# Patient Record
Sex: MALE | Race: BLACK OR AFRICAN AMERICAN | NOT HISPANIC OR LATINO | Employment: OTHER | URBAN - METROPOLITAN AREA
[De-identification: names, ages, dates, MRNs, and addresses within clinical notes are randomized per-mention and may not be internally consistent; named-entity substitution may affect disease eponyms.]

---

## 2023-04-10 ENCOUNTER — OFFICE VISIT (OUTPATIENT)
Dept: PRIMARY CARE | Facility: CLINIC | Age: 83
End: 2023-04-10
Payer: MEDICARE

## 2023-04-10 VITALS
WEIGHT: 177.8 LBS | BODY MASS INDEX: 25.51 KG/M2 | HEART RATE: 68 BPM | RESPIRATION RATE: 16 BRPM | SYSTOLIC BLOOD PRESSURE: 130 MMHG | DIASTOLIC BLOOD PRESSURE: 80 MMHG | TEMPERATURE: 98.6 F

## 2023-04-10 DIAGNOSIS — N40.1 BENIGN PROSTATIC HYPERPLASIA WITH NOCTURIA: ICD-10-CM

## 2023-04-10 DIAGNOSIS — E78.49 OTHER HYPERLIPIDEMIA: ICD-10-CM

## 2023-04-10 DIAGNOSIS — F01.A0 MILD VASCULAR DEMENTIA WITHOUT BEHAVIORAL DISTURBANCE, PSYCHOTIC DISTURBANCE, MOOD DISTURBANCE, OR ANXIETY (MULTI): ICD-10-CM

## 2023-04-10 DIAGNOSIS — I63.9 CEREBRAL INFARCTION, UNSPECIFIED MECHANISM (MULTI): ICD-10-CM

## 2023-04-10 DIAGNOSIS — I63.9 CEREBROVASCULAR ACCIDENT (CVA), UNSPECIFIED MECHANISM (MULTI): Primary | ICD-10-CM

## 2023-04-10 DIAGNOSIS — M48.061 SPINAL STENOSIS OF LUMBAR REGION WITHOUT NEUROGENIC CLAUDICATION: ICD-10-CM

## 2023-04-10 DIAGNOSIS — G47.62 NOCTURNAL LEG CRAMPS: ICD-10-CM

## 2023-04-10 DIAGNOSIS — G25.81 RLS (RESTLESS LEGS SYNDROME): ICD-10-CM

## 2023-04-10 DIAGNOSIS — R35.1 BENIGN PROSTATIC HYPERPLASIA WITH NOCTURIA: ICD-10-CM

## 2023-04-10 DIAGNOSIS — N18.32 STAGE 3B CHRONIC KIDNEY DISEASE (MULTI): ICD-10-CM

## 2023-04-10 DIAGNOSIS — I10 PRIMARY HYPERTENSION: ICD-10-CM

## 2023-04-10 PROBLEM — N43.3 HYDROCELE: Status: ACTIVE | Noted: 2023-04-10

## 2023-04-10 PROBLEM — M47.12 OSTEOARTHRITIS OF CERVICAL SPINE WITH MYELOPATHY: Status: ACTIVE | Noted: 2023-04-10

## 2023-04-10 PROBLEM — H02.88A MEIBOMIAN GLAND DYSFUNCTION (MGD), BILATERAL, BOTH UPPER AND LOWER LIDS: Status: ACTIVE | Noted: 2023-04-10

## 2023-04-10 PROBLEM — R82.90 ABNORMAL URINE FINDINGS: Status: ACTIVE | Noted: 2023-04-10

## 2023-04-10 PROBLEM — H02.88B MEIBOMIAN GLAND DYSFUNCTION (MGD), BILATERAL, BOTH UPPER AND LOWER LIDS: Status: ACTIVE | Noted: 2023-04-10

## 2023-04-10 PROBLEM — R79.89 ELEVATED SERUM CREATININE: Status: ACTIVE | Noted: 2023-04-10

## 2023-04-10 PROBLEM — H52.4 BILATERAL PRESBYOPIA: Status: ACTIVE | Noted: 2023-04-10

## 2023-04-10 PROBLEM — M25.431 SWELLING OF RIGHT WRIST: Status: ACTIVE | Noted: 2023-04-10

## 2023-04-10 PROBLEM — H04.123 BILATERAL DRY EYES: Status: ACTIVE | Noted: 2023-04-10

## 2023-04-10 PROBLEM — H04.123 DRY EYE SYNDROME OF BOTH LACRIMAL GLANDS: Status: ACTIVE | Noted: 2023-04-10

## 2023-04-10 PROBLEM — R53.1 LEFT-SIDED WEAKNESS: Status: ACTIVE | Noted: 2023-04-10

## 2023-04-10 PROBLEM — H91.90 HEARING LOSS: Status: ACTIVE | Noted: 2023-04-10

## 2023-04-10 PROBLEM — E78.5 HYPERLIPIDEMIA: Status: ACTIVE | Noted: 2023-04-10

## 2023-04-10 PROBLEM — F01.50 VASCULAR DEMENTIA (MULTI): Status: ACTIVE | Noted: 2023-04-10

## 2023-04-10 PROBLEM — N39.0 BACTERIAL UTI: Status: ACTIVE | Noted: 2023-04-10

## 2023-04-10 PROBLEM — N52.9 MALE ERECTILE DISORDER OF ORGANIC ORIGIN: Status: ACTIVE | Noted: 2023-04-10

## 2023-04-10 PROBLEM — H90.3 BILATERAL SENSORINEURAL HEARING LOSS: Status: ACTIVE | Noted: 2023-04-10

## 2023-04-10 PROBLEM — H52.03 HYPERMETROPIA OF BOTH EYES: Status: ACTIVE | Noted: 2023-04-10

## 2023-04-10 PROBLEM — M25.531 RIGHT WRIST PAIN: Status: ACTIVE | Noted: 2023-04-10

## 2023-04-10 PROBLEM — R32 URINARY INCONTINENCE: Status: ACTIVE | Noted: 2023-04-10

## 2023-04-10 PROBLEM — M10.9 GOUTY ARTHRITIS: Status: ACTIVE | Noted: 2023-04-10

## 2023-04-10 PROBLEM — E55.9 VITAMIN D DEFICIENCY: Status: ACTIVE | Noted: 2023-04-10

## 2023-04-10 PROBLEM — J02.9 SORE THROAT: Status: ACTIVE | Noted: 2023-04-10

## 2023-04-10 PROBLEM — N18.30 CKD (CHRONIC KIDNEY DISEASE), STAGE III (MULTI): Status: ACTIVE | Noted: 2023-04-10

## 2023-04-10 PROBLEM — R35.0 URINARY FREQUENCY: Status: ACTIVE | Noted: 2023-04-10

## 2023-04-10 PROBLEM — R26.9 GAIT ABNORMALITY: Status: ACTIVE | Noted: 2023-04-10

## 2023-04-10 PROBLEM — K59.00 CONSTIPATION, ACUTE: Status: ACTIVE | Noted: 2023-04-10

## 2023-04-10 PROBLEM — Z96.1 BILATERAL PSEUDOPHAKIA: Status: ACTIVE | Noted: 2023-04-10

## 2023-04-10 PROBLEM — N50.89 SWELLING OF LEFT HALF OF SCROTUM: Status: ACTIVE | Noted: 2023-04-10

## 2023-04-10 PROBLEM — I26.99 PULMONARY EMBOLISM (MULTI): Status: ACTIVE | Noted: 2023-04-10

## 2023-04-10 PROBLEM — R25.1 TREMOR: Status: ACTIVE | Noted: 2023-04-10

## 2023-04-10 PROBLEM — N32.81 OVERACTIVE BLADDER: Status: ACTIVE | Noted: 2023-04-10

## 2023-04-10 PROBLEM — H52.203 ASTIGMATISM OF BOTH EYES: Status: ACTIVE | Noted: 2023-04-10

## 2023-04-10 PROBLEM — N40.0 BPH (BENIGN PROSTATIC HYPERPLASIA): Status: ACTIVE | Noted: 2023-04-10

## 2023-04-10 PROBLEM — R26.89 SHUFFLING GAIT: Status: ACTIVE | Noted: 2023-04-10

## 2023-04-10 PROBLEM — R13.10 SWALLOWING IMPAIRED: Status: ACTIVE | Noted: 2023-04-10

## 2023-04-10 PROBLEM — N28.1 CYST, KIDNEY, ACQUIRED: Status: ACTIVE | Noted: 2023-04-10

## 2023-04-10 PROBLEM — A49.9 BACTERIAL UTI: Status: ACTIVE | Noted: 2023-04-10

## 2023-04-10 PROBLEM — J06.9 VIRAL UPPER RESPIRATORY TRACT INFECTION: Status: ACTIVE | Noted: 2023-04-10

## 2023-04-10 PROBLEM — R26.81 GAIT INSTABILITY: Status: ACTIVE | Noted: 2023-04-10

## 2023-04-10 PROCEDURE — 99214 OFFICE O/P EST MOD 30 MIN: CPT | Performed by: INTERNAL MEDICINE

## 2023-04-10 PROCEDURE — 3079F DIAST BP 80-89 MM HG: CPT | Performed by: INTERNAL MEDICINE

## 2023-04-10 PROCEDURE — 1160F RVW MEDS BY RX/DR IN RCRD: CPT | Performed by: INTERNAL MEDICINE

## 2023-04-10 PROCEDURE — 1159F MED LIST DOCD IN RCRD: CPT | Performed by: INTERNAL MEDICINE

## 2023-04-10 PROCEDURE — 1036F TOBACCO NON-USER: CPT | Performed by: INTERNAL MEDICINE

## 2023-04-10 PROCEDURE — 3075F SYST BP GE 130 - 139MM HG: CPT | Performed by: INTERNAL MEDICINE

## 2023-04-10 RX ORDER — OXYCODONE HYDROCHLORIDE 5 MG/1
TABLET ORAL
COMMUNITY
Start: 2022-12-13 | End: 2023-07-17 | Stop reason: ALTCHOICE

## 2023-04-10 RX ORDER — MV/FA/DHA/EPA/FISH OIL/SAW/GNK 400MCG-200
COMBINATION PACKAGE (EA) ORAL
COMMUNITY

## 2023-04-10 RX ORDER — NIFEDIPINE 90 MG/1
1 TABLET, FILM COATED, EXTENDED RELEASE ORAL DAILY
COMMUNITY
Start: 2014-06-12 | End: 2023-07-17 | Stop reason: SDUPTHER

## 2023-04-10 RX ORDER — CYCLOBENZAPRINE HCL 5 MG
5 TABLET ORAL 3 TIMES DAILY PRN
Qty: 30 TABLET | Refills: 2 | Status: SHIPPED | OUTPATIENT
Start: 2023-04-10 | End: 2023-08-17 | Stop reason: SDUPTHER

## 2023-04-10 RX ORDER — CYCLOBENZAPRINE HCL 5 MG
5 TABLET ORAL NIGHTLY
COMMUNITY
Start: 2023-02-26 | End: 2023-04-10 | Stop reason: SDUPTHER

## 2023-04-10 RX ORDER — ATORVASTATIN CALCIUM 20 MG/1
1 TABLET, FILM COATED ORAL DAILY
COMMUNITY

## 2023-04-10 RX ORDER — DONEPEZIL HYDROCHLORIDE 10 MG/1
1 TABLET, FILM COATED ORAL NIGHTLY
COMMUNITY
Start: 2022-01-20 | End: 2023-04-10 | Stop reason: SDUPTHER

## 2023-04-10 RX ORDER — ASPIRIN 81 MG/1
1 TABLET ORAL DAILY
COMMUNITY
Start: 2019-11-25

## 2023-04-10 RX ORDER — ROPINIROLE 0.5 MG/1
1 TABLET, FILM COATED ORAL NIGHTLY
COMMUNITY
Start: 2021-04-27 | End: 2024-02-06 | Stop reason: SDUPTHER

## 2023-04-10 RX ORDER — DONEPEZIL HYDROCHLORIDE 10 MG/1
10 TABLET, FILM COATED ORAL NIGHTLY
Qty: 30 TABLET | Refills: 2 | Status: SHIPPED | OUTPATIENT
Start: 2023-04-10 | End: 2023-07-17 | Stop reason: SDUPTHER

## 2023-04-10 RX ORDER — MIRABEGRON 25 MG/1
25 TABLET, FILM COATED, EXTENDED RELEASE ORAL DAILY
COMMUNITY
Start: 2023-01-09 | End: 2023-07-17 | Stop reason: SDUPTHER

## 2023-04-10 ASSESSMENT — ENCOUNTER SYMPTOMS
DEPRESSION: 0
OCCASIONAL FEELINGS OF UNSTEADINESS: 1
LOSS OF SENSATION IN FEET: 0

## 2023-04-10 NOTE — PROGRESS NOTES
Marlo Carl is a 82 y.o. male   Patient with a past medical history of HTN, HLD, BPH with LUTS, CKD III. Lumbar Stenosis, Pulmonary Embolism, Vascular Dementia, OAB, Incontinence, Anemia    Has not seen the Urologist yet  Has not seen the Neurologist yet                 Review of Systems     Constitutional: no fever, no chills, not feeling poorly, not feeling tired and no recent weight gain, no recent weight loss.   ENT: no earache, no hearing loss, no nosebleeds, no nasal discharge, no sore throat and no hoarseness.   Cardiovascular: the heart rate was not slow, the heart rate was not fast, no chest pain, no palpitations, no intermittent leg claudication and no lower extremity edema.   Respiratory: no cough, wheezing or shortness of breath at rest or exertion  Gastrointestinal: no abdominal pain, no constipation, no melena, no nausea, no diarrhea, no vomiting and no blood in stools.   Musculoskeletal: no arthralgias, no myalgias, no back pain, no joint swelling, no joint stiffness, no limb pain and no limb swelling.   Integumentary: no skin rashes, no skin lesions, no itching, no skin wound and no dry skin.   Neurological: no headache, no confusion, no numbness, no dizziness, no tingling and no fainting.   All other systems have been reviewed and are negative for complaint.       Vitals:    04/10/23 1612   Temp: 37 °C (98.6 °F)        Physical Exam     Constitutional   General appearance: Alert and in no acute distress.     Pulmonary   Respiratory assessment: No respiratory distress, normal respiratory rhythm and effort.    Auscultation of Lungs: Clear bilateral breath sounds.   Cardiovascular   Auscultation of heart: Apical pulse normal, heart rate and rhythm normal, normal S1 and S2, no murmurs and no pericardial rub.    Exam for edema: No peripheral edema.   Abdomen   Abdominal Exam: No bruits, normal bowel sounds, soft, non-tender, no abdominal mass palpated.    Liver and Spleen exam: No hepato-splenomegaly.    Musculoskeletal   Examination of gait: Normal.    Inspection of digits and nails: No clubbing or cyanosis of the fingernails.    Inspection/palpation of joints, bones and muscles: No joint swelling. Normal movement of all extremities.   Skin   Skin inspection: Normal skin color and pigmentation, normal skin turgor and no visible rash.   Neurologic   Cranial nerves: Nerves 2-12 were intact, no focal neuro defects.     Assessment/Plan          Patient with a past medical history of HTN, HLD, BPH with LUTS, CKD III. Lumbar Stenosis, Pulmonary Embolism, Vascular Dementia, OAB, Incontinence       # HTN  condition is stable  continue current medications     # CKD III  stable     # HLD  condition is stable  continue current medications     # Vascular Dementia  neurology referral pending; shuffling gait and tremor- concerned for Parkinson's      # BPH/ Nocturia/ Scrotal swelling  S/p Hydrocele surgery  Doing better as far as hydrocele goes  But incontinence persists (was done at [facility])

## 2023-04-13 DIAGNOSIS — N39.0 RECURRENT UTI: ICD-10-CM

## 2023-04-13 DIAGNOSIS — R25.1 TREMORS OF NERVOUS SYSTEM: ICD-10-CM

## 2023-07-17 ENCOUNTER — OFFICE VISIT (OUTPATIENT)
Dept: PRIMARY CARE | Facility: CLINIC | Age: 83
End: 2023-07-17
Payer: MEDICARE

## 2023-07-17 VITALS
RESPIRATION RATE: 16 BRPM | HEART RATE: 81 BPM | DIASTOLIC BLOOD PRESSURE: 86 MMHG | OXYGEN SATURATION: 97 % | BODY MASS INDEX: 25.54 KG/M2 | SYSTOLIC BLOOD PRESSURE: 175 MMHG | WEIGHT: 178 LBS

## 2023-07-17 DIAGNOSIS — I10 PRIMARY HYPERTENSION: Primary | ICD-10-CM

## 2023-07-17 DIAGNOSIS — E78.49 OTHER HYPERLIPIDEMIA: ICD-10-CM

## 2023-07-17 DIAGNOSIS — N39.46 MIXED STRESS AND URGE URINARY INCONTINENCE: ICD-10-CM

## 2023-07-17 DIAGNOSIS — F01.A0 MILD VASCULAR DEMENTIA WITHOUT BEHAVIORAL DISTURBANCE, PSYCHOTIC DISTURBANCE, MOOD DISTURBANCE, OR ANXIETY (MULTI): ICD-10-CM

## 2023-07-17 LAB
POC HDL CHOLESTEROL: 53 MG/DL (ref 0–40)
POC LDL CHOLESTEROL: 63 MG/DL (ref 0–100)
POC NON-HDL CHOLESTEROL: 87 MG/DL (ref 0–130)
POC TOTAL CHOLESTEROL/HDL RATIO: 2.6 (ref 0–4.5)
POC TOTAL CHOLESTEROL: 140 MG/DL (ref 0–199)
POC TRIGLYCERIDES: 119 MG/DL (ref 0–150)

## 2023-07-17 PROCEDURE — 1125F AMNT PAIN NOTED PAIN PRSNT: CPT | Performed by: INTERNAL MEDICINE

## 2023-07-17 PROCEDURE — 1157F ADVNC CARE PLAN IN RCRD: CPT | Performed by: INTERNAL MEDICINE

## 2023-07-17 PROCEDURE — 80061 LIPID PANEL: CPT | Performed by: INTERNAL MEDICINE

## 2023-07-17 PROCEDURE — 1159F MED LIST DOCD IN RCRD: CPT | Performed by: INTERNAL MEDICINE

## 2023-07-17 PROCEDURE — 99214 OFFICE O/P EST MOD 30 MIN: CPT | Performed by: INTERNAL MEDICINE

## 2023-07-17 PROCEDURE — 3079F DIAST BP 80-89 MM HG: CPT | Performed by: INTERNAL MEDICINE

## 2023-07-17 PROCEDURE — 1160F RVW MEDS BY RX/DR IN RCRD: CPT | Performed by: INTERNAL MEDICINE

## 2023-07-17 PROCEDURE — 3077F SYST BP >= 140 MM HG: CPT | Performed by: INTERNAL MEDICINE

## 2023-07-17 PROCEDURE — 1036F TOBACCO NON-USER: CPT | Performed by: INTERNAL MEDICINE

## 2023-07-17 RX ORDER — MIRABEGRON 25 MG/1
25 TABLET, FILM COATED, EXTENDED RELEASE ORAL DAILY
Qty: 30 TABLET | Refills: 2 | Status: SHIPPED | OUTPATIENT
Start: 2023-07-17 | End: 2023-10-19 | Stop reason: ALTCHOICE

## 2023-07-17 RX ORDER — NIFEDIPINE 90 MG/1
90 TABLET, FILM COATED, EXTENDED RELEASE ORAL DAILY
Qty: 30 TABLET | Refills: 2 | Status: SHIPPED | OUTPATIENT
Start: 2023-07-17 | End: 2023-10-25 | Stop reason: SDUPTHER

## 2023-07-17 RX ORDER — DONEPEZIL HYDROCHLORIDE 10 MG/1
10 TABLET, FILM COATED ORAL NIGHTLY
Qty: 30 TABLET | Refills: 2 | Status: SHIPPED | OUTPATIENT
Start: 2023-07-17 | End: 2024-05-01

## 2023-07-17 NOTE — PROGRESS NOTES
Marlo Carl is a 83 y.o. male   Patient with a past medical history of HTN, HLD, BPH with LUTS, CKD III. Lumbar Stenosis, Pulmonary Embolism, Vascular Dementia, OAB, Incontinence, Anemia      Has not seen the Neurologist yet (appointment in September)  Seeing Urology at [facility] for the hydrocele    Feels fine  No chest pain/  SOB/ dizziness  BM OK  Energy level ok  Appetite OK    Did have one fall 3 weeks ago  No injuries                   Review of Systems     Constitutional: no fever, no chills, not feeling poorly, not feeling tired and no recent weight gain, no recent weight loss.   ENT: no earache, no hearing loss, no nosebleeds, no nasal discharge, no sore throat and no hoarseness.   Cardiovascular: the heart rate was not slow, the heart rate was not fast, no chest pain, no palpitations, no intermittent leg claudication and no lower extremity edema.   Respiratory: no cough, wheezing or shortness of breath at rest or exertion  Gastrointestinal: no abdominal pain, no constipation, no melena, no nausea, no diarrhea, no vomiting and no blood in stools.   Musculoskeletal: no arthralgias, no myalgias, no back pain, no joint swelling, no joint stiffness, no limb pain and no limb swelling.   Integumentary: no skin rashes, no skin lesions, no itching, no skin wound and no dry skin.   Neurological: no headache, no confusion, no numbness, no dizziness, no tingling and no fainting.   All other systems have been reviewed and are negative for complaint.       Vitals:    07/17/23 1613   BP: 175/86   Pulse: 81   Resp: 16   SpO2: 97%        Physical Exam     Constitutional   General appearance: Alert and in no acute distress.     Pulmonary   Respiratory assessment: No respiratory distress, normal respiratory rhythm and effort.    Auscultation of Lungs: Clear bilateral breath sounds.   Cardiovascular   Auscultation of heart: Apical pulse normal, heart rate and rhythm normal, normal S1 and S2, no murmurs and no pericardial rub.     Exam for edema: No peripheral edema.   Abdomen   Abdominal Exam: No bruits, normal bowel sounds, soft, non-tender, no abdominal mass palpated.    Liver and Spleen exam: No hepato-splenomegaly.   Musculoskeletal   Examination of gait: Normal.    Inspection of digits and nails: No clubbing or cyanosis of the fingernails.    Inspection/palpation of joints, bones and muscles: No joint swelling. Normal movement of all extremities.   Skin   Skin inspection: Normal skin color and pigmentation, normal skin turgor and no visible rash.   Neurologic   Cranial nerves: Nerves 2-12 were intact, no focal neuro defects.     Assessment/Plan          Patient with a past medical history of HTN, HLD, BPH with LUTS, CKD III. Lumbar Stenosis, Pulmonary Embolism, Vascular Dementia, OAB, Incontinence       # HTN  Elevated  Has been forgetting to take meds  family will make sure of compliance       # CKD III  stable     # HLD  condition is stable  continue current medications     # Vascular Dementia  neurology referral pending; shuffling gait and tremor- concerned for Parkinson's   Recommend to use hearing aides everyday     # BPH/ Nocturia/ Scrotal swelling  S/p Hydrocele surgery  Doing better  Incontinence has improved  But needs diapers    Total appt time today was 35  minutes. Time included preparing to see the pt, obtaining the hx, performing the medically necessary appropriate physical exam, counseling & educating the pt, ordering tests & procedures, referring & communicating w/other providers, independently interpreting results & communicating the results to the pt, care, coordination & documenting clinical information in the medical record.

## 2023-08-17 DIAGNOSIS — G47.62 NOCTURNAL LEG CRAMPS: ICD-10-CM

## 2023-08-17 RX ORDER — CYCLOBENZAPRINE HCL 5 MG
5 TABLET ORAL 3 TIMES DAILY PRN
Qty: 30 TABLET | Refills: 2 | Status: ON HOLD | OUTPATIENT
Start: 2023-08-17 | End: 2024-05-01 | Stop reason: ALTCHOICE

## 2023-08-17 NOTE — TELEPHONE ENCOUNTER
Rx Refill Request Telephone Encounter    Name:  Marlo Carl  :  [id number]  Medication Name:  cclobenzaprine 5mg   Specific Pharmacy location:  [facility]

## 2023-10-19 ENCOUNTER — APPOINTMENT (OUTPATIENT)
Dept: PRIMARY CARE | Facility: CLINIC | Age: 83
End: 2023-10-19
Payer: MEDICARE

## 2023-10-19 ENCOUNTER — OFFICE VISIT (OUTPATIENT)
Dept: UROLOGY | Facility: CLINIC | Age: 83
End: 2023-10-19
Payer: MEDICARE

## 2023-10-19 VITALS
HEART RATE: 94 BPM | DIASTOLIC BLOOD PRESSURE: 86 MMHG | TEMPERATURE: 96.4 F | BODY MASS INDEX: 23.02 KG/M2 | WEIGHT: 160.4 LBS | SYSTOLIC BLOOD PRESSURE: 163 MMHG

## 2023-10-19 DIAGNOSIS — N39.41 URGENCY INCONTINENCE: Primary | ICD-10-CM

## 2023-10-19 DIAGNOSIS — N32.81 OVERACTIVE BLADDER: ICD-10-CM

## 2023-10-19 PROCEDURE — 1160F RVW MEDS BY RX/DR IN RCRD: CPT | Performed by: STUDENT IN AN ORGANIZED HEALTH CARE EDUCATION/TRAINING PROGRAM

## 2023-10-19 PROCEDURE — 99204 OFFICE O/P NEW MOD 45 MIN: CPT | Performed by: STUDENT IN AN ORGANIZED HEALTH CARE EDUCATION/TRAINING PROGRAM

## 2023-10-19 PROCEDURE — 1125F AMNT PAIN NOTED PAIN PRSNT: CPT | Performed by: STUDENT IN AN ORGANIZED HEALTH CARE EDUCATION/TRAINING PROGRAM

## 2023-10-19 PROCEDURE — 3077F SYST BP >= 140 MM HG: CPT | Performed by: STUDENT IN AN ORGANIZED HEALTH CARE EDUCATION/TRAINING PROGRAM

## 2023-10-19 PROCEDURE — 3079F DIAST BP 80-89 MM HG: CPT | Performed by: STUDENT IN AN ORGANIZED HEALTH CARE EDUCATION/TRAINING PROGRAM

## 2023-10-19 PROCEDURE — 1159F MED LIST DOCD IN RCRD: CPT | Performed by: STUDENT IN AN ORGANIZED HEALTH CARE EDUCATION/TRAINING PROGRAM

## 2023-10-19 RX ORDER — MIRABEGRON 50 MG/1
50 TABLET, EXTENDED RELEASE ORAL DAILY
Status: DISCONTINUED | OUTPATIENT
Start: 2023-10-19 | End: 2024-02-06

## 2023-10-19 NOTE — PROGRESS NOTES
Marlo presents as a new patient for an evaluation.  The patient’s EMR has been reviewed.  Lives in [place], [place]  Accompanied by Daughter, Nataliia whom provides additional history.   Previously evaluated by Dr. Piña and Dr. Quiroz.    SUBJECTIVE: HPI   TODAY (10/18/23)  H/o BPH w/ LUTS, OAB symptoms and UUI.  Has failed bladder botox (2019) and myrbetriq 25 mg for his UUI.   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   Has tried condom catheterization in the past, but it would not stay on.  Has tried ISC in the past, however only produced low volumes thus discontinued.  Continues to leak, worse at night, wears precautionary pads for this.     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  PSH botox, hyrocelectomy  FH of cancer, unspecified.   Former smoker (quit in [id number])    Past medical, surgical, family and social history in the chart was reviewed and is accurate including any additions to what is in this HPI.    Review of Systems   Constitutional: denies any unintentional weight loss or change in strength.  Integumentary: denies any rashes or pruritus.  Eyes: denies any double vision or eye pain.  Ear/Nose/Mouth/Throat: denies any nosebleeds or gum bleeds.  Cardiovascular: denies any chest pain or syncope.  Respiratory: denies hemoptysis.  Gastrointestinal: denies nausea or vomiting.  Musculoskeletal: denies muscle cramping or weakness.  Neurologic: denies convulsions or seizures.  Hematologic/Lymphatic: denies bleeding tendencies.  Endocrine: denies heat/cold intolerance.  All other systems have been reviewed and are negative unless otherwise noted in the HPI.    OBJECTIVE:  Visit Vitals  /86   Pulse 94   Temp 35.8 °C (96.4 °F)     Physical Exam   Constitutional: No obvious distress.  Eyes: Non-injected conjunctiva, sclera clear, EOMI.  Ears/Nose/Mouth/Throat: No obvious drainage per ears or nose.  Cardiovascular: Extremities are warm and well  perfused. No edema, cyanosis or pallor.  Respiratory: No audible wheezing/stridor; respirations do not appear labored.  Gastrointestinal: Abdomen soft, not distended.  Musculoskeletal: Normal ROM of extremities.  Skin: No obvious rashes or open sores.  Neurologic: Alert and oriented, CN 2-12 grossly intact.  Psychiatric: Answers questions appropriately with normal affect.  Hematologic/Lymphatic/Immunologic: No obvious bruises or sites of spontaneous bleeding.  Genitourinary: No CVA tenderness, bladder not palpable.     Labs and imaging:  Lab Results   Component Value Date    WBC 7.7 10/15/2022    HGB 12.7 (L) 10/15/2022    HCT 39.5 (L) 10/15/2022     10/15/2022    CHOL 140 07/17/2023    TRIG 119 07/17/2023    HDL 53 (A) 07/17/2023    ALT 20 10/15/2022    AST 20 10/15/2022     10/15/2022    K 3.9 10/15/2022     10/15/2022    CREATININE 1.70 (H) 10/15/2022    BUN 24 (H) 10/15/2022    CO2 25 10/15/2022    TSH 2.23 03/12/2022    PSA 2.44 03/12/2022    INR 1.0 10/11/2019     ASSESSMENT:  Problem List Items Addressed This Visit       Overactive bladder    Relevant Medications    mirabegron (Myrbetriq) 24 hr tablet 50 mg (Start on 10/19/2023  3:15 PM)    Other Relevant Orders    Follow Up In Urology     Other Visit Diagnoses       Urgency incontinence    -  Primary    Relevant Medications    mirabegron (Myrbetriq) 24 hr tablet 50 mg (Start on 10/19/2023  3:15 PM)    Other Relevant Orders    Follow Up In Urology     PLAN:  For his OAB symptoms and persistent incontinence.   Discussed AUA guidelines for overactive bladder symptoms (OAB).  Reviewed behavioral medications, medications, and third-line procedures.   Patient agrees to proceed with behavioral modifications, timed voids, and monitoring bladder irritants.  In addition, advised the following:  -Avoid caffeine  -Avoid/restrict fluids altogether after 6 pm.    Discussed options including anticholinergics, myrbetriq, bladder botox and PTNS.  Will  proceed with increasing his myrbetriq dose to 50 mg.  All potential risks and side-effects discussed.  Provided referral to Dr. Delgado for consideration for neuromodulation. He has already failed botox and symptoms remain severe.   Plan to RTC in 3 months for reassessment.     All questions were answered to the patient’s satisfaction.  Patient agrees with the plan and wishes to proceed.    Scribed for Dr. Avinash German by Ehsan Santana.  I, Dr. Avinash German have personally reviewed and agreed with the information entered by the Virtual Scribe. 10/19/23.

## 2023-10-24 ENCOUNTER — APPOINTMENT (OUTPATIENT)
Dept: PRIMARY CARE | Facility: CLINIC | Age: 83
End: 2023-10-24
Payer: MEDICARE

## 2023-10-25 ENCOUNTER — OFFICE VISIT (OUTPATIENT)
Dept: PRIMARY CARE | Facility: CLINIC | Age: 83
End: 2023-10-25
Payer: MEDICARE

## 2023-10-25 VITALS
TEMPERATURE: 97.8 F | RESPIRATION RATE: 16 BRPM | DIASTOLIC BLOOD PRESSURE: 80 MMHG | WEIGHT: 179.2 LBS | HEART RATE: 70 BPM | BODY MASS INDEX: 25.71 KG/M2 | SYSTOLIC BLOOD PRESSURE: 130 MMHG

## 2023-10-25 DIAGNOSIS — Z23 INFLUENZA VACCINATION ADMINISTERED AT CURRENT VISIT: ICD-10-CM

## 2023-10-25 DIAGNOSIS — R35.1 BENIGN PROSTATIC HYPERPLASIA WITH NOCTURIA: ICD-10-CM

## 2023-10-25 DIAGNOSIS — F01.A0 MILD VASCULAR DEMENTIA WITHOUT BEHAVIORAL DISTURBANCE, PSYCHOTIC DISTURBANCE, MOOD DISTURBANCE, OR ANXIETY (MULTI): ICD-10-CM

## 2023-10-25 DIAGNOSIS — G25.81 RLS (RESTLESS LEGS SYNDROME): ICD-10-CM

## 2023-10-25 DIAGNOSIS — E78.49 OTHER HYPERLIPIDEMIA: ICD-10-CM

## 2023-10-25 DIAGNOSIS — I10 PRIMARY HYPERTENSION: Primary | ICD-10-CM

## 2023-10-25 DIAGNOSIS — N40.1 BENIGN PROSTATIC HYPERPLASIA WITH NOCTURIA: ICD-10-CM

## 2023-10-25 PROBLEM — M19.041 PRIMARY OSTEOARTHRITIS, RIGHT HAND: Status: ACTIVE | Noted: 2021-07-26

## 2023-10-25 PROBLEM — I26.01: Status: ACTIVE | Noted: 2023-04-29

## 2023-10-25 PROBLEM — I69.354 HEMIPLGA FOLLOWING CEREBRAL INFRC AFFECTING LEFT NONDOM SIDE (MULTI): Status: ACTIVE | Noted: 2021-07-26

## 2023-10-25 LAB
POC HDL CHOLESTEROL: 49 MG/DL (ref 0–40)
POC LDL CHOLESTEROL: 57 MG/DL (ref 0–100)
POC NON-HDL CHOLESTEROL: 84 MG/DL (ref 0–130)
POC TOTAL CHOLESTEROL/HDL RATIO: 2.7 (ref 0–4.5)
POC TOTAL CHOLESTEROL: 132 MG/DL (ref 0–199)
POC TRIGLYCERIDES: 135 MG/DL (ref 0–150)

## 2023-10-25 PROCEDURE — 3079F DIAST BP 80-89 MM HG: CPT | Performed by: INTERNAL MEDICINE

## 2023-10-25 PROCEDURE — 1160F RVW MEDS BY RX/DR IN RCRD: CPT | Performed by: INTERNAL MEDICINE

## 2023-10-25 PROCEDURE — 90662 IIV NO PRSV INCREASED AG IM: CPT | Performed by: INTERNAL MEDICINE

## 2023-10-25 PROCEDURE — 99214 OFFICE O/P EST MOD 30 MIN: CPT | Performed by: INTERNAL MEDICINE

## 2023-10-25 PROCEDURE — 1125F AMNT PAIN NOTED PAIN PRSNT: CPT | Performed by: INTERNAL MEDICINE

## 2023-10-25 PROCEDURE — G0008 ADMIN INFLUENZA VIRUS VAC: HCPCS | Performed by: INTERNAL MEDICINE

## 2023-10-25 PROCEDURE — 80061 LIPID PANEL: CPT | Performed by: INTERNAL MEDICINE

## 2023-10-25 PROCEDURE — 3075F SYST BP GE 130 - 139MM HG: CPT | Performed by: INTERNAL MEDICINE

## 2023-10-25 PROCEDURE — 1159F MED LIST DOCD IN RCRD: CPT | Performed by: INTERNAL MEDICINE

## 2023-10-25 RX ORDER — NIFEDIPINE 90 MG/1
90 TABLET, FILM COATED, EXTENDED RELEASE ORAL DAILY
Qty: 90 TABLET | Refills: 2 | Status: SHIPPED | OUTPATIENT
Start: 2023-10-25 | End: 2024-07-21

## 2023-10-25 NOTE — PROGRESS NOTES
Marlo Carl is a 83 y.o. male   Patient with a past medical history of HTN, HLD, BPH with LUTS, CKD III. Lumbar Stenosis, Pulmonary Embolism, Vascular Dementia, OAB, Incontinence, Anemia      Has not seen the Neurologist yet (appointment in September)  Seeing Urology at [facility] for the hydrocele    Feels fine  No chest pain/  SOB/ dizziness  BM OK  Energy level ok  Appetite OK    Did have one fall 3 weeks ago  No injuries                 Review of Systems     Constitutional: no fever, no chills, not feeling poorly, not feeling tired and no recent weight gain, no recent weight loss.   ENT: no earache, no hearing loss, no nosebleeds, no nasal discharge, no sore throat and no hoarseness.   Cardiovascular: the heart rate was not slow, the heart rate was not fast, no chest pain, no palpitations, no intermittent leg claudication and no lower extremity edema.   Respiratory: no cough, wheezing or shortness of breath at rest or exertion  Gastrointestinal: no abdominal pain, no constipation, no melena, no nausea, no diarrhea, no vomiting and no blood in stools.   Musculoskeletal: no arthralgias, no myalgias, no back pain, no joint swelling, no joint stiffness, no limb pain and no limb swelling.   Integumentary: no skin rashes, no skin lesions, no itching, no skin wound and no dry skin.   Neurological: no headache, no confusion, no numbness, no dizziness, no tingling and no fainting.   All other systems have been reviewed and are negative for complaint.       Vitals:    10/25/23 1708   Temp: 36.6 °C (97.8 °F)        Physical Exam     Constitutional   General appearance: Alert and in no acute distress.     Pulmonary   Respiratory assessment: No respiratory distress, normal respiratory rhythm and effort.    Auscultation of Lungs: Clear bilateral breath sounds.   Cardiovascular   Auscultation of heart: Apical pulse normal, heart rate and rhythm normal, normal S1 and S2, no murmurs and no pericardial rub.    Exam for edema: No  peripheral edema.   Abdomen   Abdominal Exam: No bruits, normal bowel sounds, soft, non-tender, no abdominal mass palpated.    Liver and Spleen exam: No hepato-splenomegaly.   Musculoskeletal   Examination of gait: Normal.    Inspection of digits and nails: No clubbing or cyanosis of the fingernails.    Inspection/palpation of joints, bones and muscles: No joint swelling. Normal movement of all extremities.   Skin   Skin inspection: Normal skin color and pigmentation, normal skin turgor and no visible rash.   Neurologic   Cranial nerves: Nerves 2-12 were intact, no focal neuro defects.     Assessment/Plan          Patient with a past medical history of HTN, HLD, BPH with LUTS, CKD III. Lumbar Stenosis, Pulmonary Embolism, Vascular Dementia, OAB, Incontinence       # HTN  Elevated  Has been forgetting to take meds  family will make sure of compliance       # CKD III  stable     # HLD  condition is stable  continue current medications     # Vascular Dementia  Recommend to use hearing aides everyday     # BPH/ Nocturia/ Scrotal swelling  S/p Hydrocele surgery  Urology increased Myrbetriq    # OA of Knees  Gets stiff in AM      # RLS  Stable    Total appt time today was 35  minutes. Time included preparing to see the pt, obtaining the hx, performing the medically necessary appropriate physical exam, counseling & educating the pt, ordering tests & procedures, referring & communicating w/other providers, independently interpreting results & communicating the results to the pt, care, coordination & documenting clinical information in the medical record.

## 2023-11-14 DIAGNOSIS — N40.1 BENIGN PROSTATIC HYPERPLASIA WITH NOCTURIA: ICD-10-CM

## 2023-11-14 DIAGNOSIS — R35.1 BENIGN PROSTATIC HYPERPLASIA WITH NOCTURIA: ICD-10-CM

## 2023-11-16 RX ORDER — MIRABEGRON 50 MG/1
50 TABLET, EXTENDED RELEASE ORAL DAILY
Qty: 30 TABLET | Refills: 11 | Status: SHIPPED | OUTPATIENT
Start: 2023-11-16

## 2024-01-18 ENCOUNTER — APPOINTMENT (OUTPATIENT)
Dept: UROLOGY | Facility: CLINIC | Age: 84
End: 2024-01-18
Payer: MEDICARE

## 2024-01-22 ENCOUNTER — OFFICE VISIT (OUTPATIENT)
Dept: UROLOGY | Facility: CLINIC | Age: 84
End: 2024-01-22
Payer: MEDICARE

## 2024-01-22 VITALS
SYSTOLIC BLOOD PRESSURE: 154 MMHG | WEIGHT: 186.6 LBS | TEMPERATURE: 97.5 F | DIASTOLIC BLOOD PRESSURE: 68 MMHG | HEART RATE: 64 BPM | HEIGHT: 71 IN | BODY MASS INDEX: 26.12 KG/M2

## 2024-01-22 DIAGNOSIS — N18.32 STAGE 3B CHRONIC KIDNEY DISEASE (MULTI): ICD-10-CM

## 2024-01-22 DIAGNOSIS — N39.41 URGE INCONTINENCE OF URINE: ICD-10-CM

## 2024-01-22 DIAGNOSIS — F01.A0 MILD VASCULAR DEMENTIA WITHOUT BEHAVIORAL DISTURBANCE, PSYCHOTIC DISTURBANCE, MOOD DISTURBANCE, OR ANXIETY (MULTI): ICD-10-CM

## 2024-01-22 DIAGNOSIS — N39.0 BACTERIAL UTI: ICD-10-CM

## 2024-01-22 DIAGNOSIS — A49.9 BACTERIAL UTI: ICD-10-CM

## 2024-01-22 DIAGNOSIS — R82.90 ABNORMAL URINALYSIS: Primary | ICD-10-CM

## 2024-01-22 LAB
POC APPEARANCE, URINE: ABNORMAL
POC BILIRUBIN, URINE: NEGATIVE
POC BLOOD, URINE: ABNORMAL
POC COLOR, URINE: YELLOW
POC GLUCOSE, URINE: NEGATIVE MG/DL
POC KETONES, URINE: NEGATIVE MG/DL
POC LEUKOCYTES, URINE: ABNORMAL
POC NITRITE,URINE: NEGATIVE
POC PH, URINE: 6 PH
POC PROTEIN, URINE: ABNORMAL MG/DL
POC SPECIFIC GRAVITY, URINE: 1.02
POC UROBILINOGEN, URINE: 0.2 EU/DL

## 2024-01-22 PROCEDURE — 87086 URINE CULTURE/COLONY COUNT: CPT

## 2024-01-22 PROCEDURE — 99214 OFFICE O/P EST MOD 30 MIN: CPT | Performed by: STUDENT IN AN ORGANIZED HEALTH CARE EDUCATION/TRAINING PROGRAM

## 2024-01-22 PROCEDURE — 1159F MED LIST DOCD IN RCRD: CPT | Performed by: STUDENT IN AN ORGANIZED HEALTH CARE EDUCATION/TRAINING PROGRAM

## 2024-01-22 PROCEDURE — 1125F AMNT PAIN NOTED PAIN PRSNT: CPT | Performed by: STUDENT IN AN ORGANIZED HEALTH CARE EDUCATION/TRAINING PROGRAM

## 2024-01-22 PROCEDURE — 3077F SYST BP >= 140 MM HG: CPT | Performed by: STUDENT IN AN ORGANIZED HEALTH CARE EDUCATION/TRAINING PROGRAM

## 2024-01-22 PROCEDURE — 1160F RVW MEDS BY RX/DR IN RCRD: CPT | Performed by: STUDENT IN AN ORGANIZED HEALTH CARE EDUCATION/TRAINING PROGRAM

## 2024-01-22 PROCEDURE — 81002 URINALYSIS NONAUTO W/O SCOPE: CPT | Performed by: STUDENT IN AN ORGANIZED HEALTH CARE EDUCATION/TRAINING PROGRAM

## 2024-01-22 PROCEDURE — 3078F DIAST BP <80 MM HG: CPT | Performed by: STUDENT IN AN ORGANIZED HEALTH CARE EDUCATION/TRAINING PROGRAM

## 2024-01-22 PROCEDURE — 1157F ADVNC CARE PLAN IN RCRD: CPT | Performed by: STUDENT IN AN ORGANIZED HEALTH CARE EDUCATION/TRAINING PROGRAM

## 2024-01-22 NOTE — PROGRESS NOTES
Marlo presents for a follow up evaluation.  The patient’s EMR has been reviewed.  Lives in [place], [place]  Accompanied by Daughter, Nataliia whom provides additional history.   Previously evaluated by Dr. Piña and Dr. Quiroz.    H/o BPH w/ LUTS, OAB symptoms and UUI.  Has failed bladder botox (2019) and myrbetriq 25 mg for his UUI.   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   Has tried condom catheterization in the past, but it would not stay on.  Has tried ISC in the past, however only produced low volumes thus discontinued.  Continues to leak, worse at night, wears precautionary pads for this.   Started on trial of Myrbetriq and reviewed behavior modifications last visit.   Provided referral to Dr. Delgado for consideration for neuromodulation.   He has already failed botox and symptoms remain severe.     SUBJECTIVE: HPI   TODAY (01/22/24)  Presents today for follow up and med check.   Reports no significant improvement with myrbetriq.   Continues to experience OAB symptoms and UUI.   Unsure if he had a recent or developing infections.  2/2 having a fever yesterday.     TO REVIEW: Initial visit (10/18/23)  H/o BPH w/ LUTS, OAB symptoms and UUI.  Has failed bladder botox (2019) and myrbetriq 25 mg for his UUI.   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   Has tried condom catheterization in the past, but it would not stay on.  Has tried ISC in the past, however only produced low volumes thus discontinued.  Continues to leak, worse at night, wears precautionary pads for this.     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  PSH botox, hyrocelectomy  FH of cancer, unspecified.   Former smoker (quit in [id number])    Past medical, surgical, family and social history in the chart was reviewed and is accurate including any additions to what is in this HPI.    Review of Systems   Constitutional: denies any unintentional weight loss or change in  strength.  Integumentary: denies any rashes or pruritus.  Eyes: denies any double vision or eye pain.  Ear/Nose/Mouth/Throat: denies any nosebleeds or gum bleeds.  Cardiovascular: denies any chest pain or syncope.  Respiratory: denies hemoptysis.  Gastrointestinal: denies nausea or vomiting.  Musculoskeletal: denies muscle cramping or weakness.  Neurologic: denies convulsions or seizures.  Hematologic/Lymphatic: denies bleeding tendencies.  Endocrine: denies heat/cold intolerance.  All other systems have been reviewed and are negative unless otherwise noted in the HPI.    OBJECTIVE:  There were no vitals taken for this visit.    Physical Exam   Constitutional: No obvious distress.  Eyes: Non-injected conjunctiva, sclera clear, EOMI.  Ears/Nose/Mouth/Throat: No obvious drainage per ears or nose.  Cardiovascular: Extremities are warm and well perfused. No edema, cyanosis or pallor.  Respiratory: No audible wheezing/stridor; respirations do not appear labored.  Gastrointestinal: Abdomen soft, not distended.  Musculoskeletal: Normal ROM of extremities.  Skin: No obvious rashes or open sores.  Neurologic: Alert and oriented, CN 2-12 grossly intact.  Psychiatric: Answers questions appropriately with normal affect.  Hematologic/Lymphatic/Immunologic: No obvious bruises or sites of spontaneous bleeding.  Genitourinary: No CVA tenderness, bladder not palpable.     Labs and imaging:  Lab Results   Component Value Date    WBC 7.7 10/15/2022    HGB 12.7 (L) 10/15/2022    HCT 39.5 (L) 10/15/2022     10/15/2022    CHOL 132 10/25/2023    TRIG 135 10/25/2023    HDL 49 (A) 10/25/2023    ALT 20 10/15/2022    AST 20 10/15/2022     10/15/2022    K 3.9 10/15/2022     10/15/2022    CREATININE 1.70 (H) 10/15/2022    BUN 24 (H) 10/15/2022    CO2 25 10/15/2022    TSH 2.23 03/12/2022    PSA 2.44 03/12/2022    INR 1.0 10/11/2019     ASSESSMENT:  1. OAB symptoms  2. UUI  3. BPH with LUTS    PLAN:  For his OAB symptoms and  persistent incontinence.   Provided referral to Dr. Delgado for consideration for neuromodulation.   Already failed myrbetriq, and botox therapy.  Symptoms remain severe.   Unsure if he has an infection currently.   Send urine for culture and treat if indicated.     Discussed AUA guidelines for overactive bladder symptoms (OAB).  Reviewed behavioral medications, medications, and third-line procedures.   Patient agrees to proceed with behavioral modifications, timed voids, and monitoring bladder irritants.  In addition, advised the following:  -Avoid caffeine  -Avoid/restrict fluids altogether after 6 pm.    All questions were answered to the patient’s satisfaction.  Patient agrees with the plan and wishes to proceed.    Scribed for Dr. Avinash German by Ehsan Santana.  I, Dr. Avinash German have personally reviewed and agreed with the information entered by the Virtual Scribe. 01/22/24.

## 2024-01-23 LAB — BACTERIA UR CULT: ABNORMAL

## 2024-01-25 ENCOUNTER — APPOINTMENT (OUTPATIENT)
Dept: PRIMARY CARE | Facility: CLINIC | Age: 84
End: 2024-01-25
Payer: MEDICARE

## 2024-01-26 DIAGNOSIS — R82.90 ABNORMAL URINALYSIS: ICD-10-CM

## 2024-02-06 ENCOUNTER — OFFICE VISIT (OUTPATIENT)
Dept: PRIMARY CARE | Facility: CLINIC | Age: 84
End: 2024-02-06
Payer: MEDICARE

## 2024-02-06 VITALS
RESPIRATION RATE: 16 BRPM | DIASTOLIC BLOOD PRESSURE: 70 MMHG | WEIGHT: 169 LBS | BODY MASS INDEX: 23.57 KG/M2 | SYSTOLIC BLOOD PRESSURE: 130 MMHG | TEMPERATURE: 98.3 F | HEART RATE: 68 BPM

## 2024-02-06 DIAGNOSIS — I10 PRIMARY HYPERTENSION: Primary | ICD-10-CM

## 2024-02-06 DIAGNOSIS — N18.32 STAGE 3B CHRONIC KIDNEY DISEASE (MULTI): ICD-10-CM

## 2024-02-06 DIAGNOSIS — G25.81 RLS (RESTLESS LEGS SYNDROME): ICD-10-CM

## 2024-02-06 DIAGNOSIS — E78.49 OTHER HYPERLIPIDEMIA: ICD-10-CM

## 2024-02-06 DIAGNOSIS — F01.A0 MILD VASCULAR DEMENTIA WITHOUT BEHAVIORAL DISTURBANCE, PSYCHOTIC DISTURBANCE, MOOD DISTURBANCE, OR ANXIETY (MULTI): ICD-10-CM

## 2024-02-06 PROCEDURE — 99214 OFFICE O/P EST MOD 30 MIN: CPT | Performed by: INTERNAL MEDICINE

## 2024-02-06 PROCEDURE — 1159F MED LIST DOCD IN RCRD: CPT | Performed by: INTERNAL MEDICINE

## 2024-02-06 PROCEDURE — 3078F DIAST BP <80 MM HG: CPT | Performed by: INTERNAL MEDICINE

## 2024-02-06 PROCEDURE — 1160F RVW MEDS BY RX/DR IN RCRD: CPT | Performed by: INTERNAL MEDICINE

## 2024-02-06 PROCEDURE — 1157F ADVNC CARE PLAN IN RCRD: CPT | Performed by: INTERNAL MEDICINE

## 2024-02-06 PROCEDURE — 3075F SYST BP GE 130 - 139MM HG: CPT | Performed by: INTERNAL MEDICINE

## 2024-02-06 PROCEDURE — 1125F AMNT PAIN NOTED PAIN PRSNT: CPT | Performed by: INTERNAL MEDICINE

## 2024-02-06 RX ORDER — ROPINIROLE 0.5 MG/1
0.5 TABLET, FILM COATED ORAL NIGHTLY
Qty: 30 TABLET | Refills: 1 | Status: SHIPPED | OUTPATIENT
Start: 2024-02-06

## 2024-02-06 NOTE — PROGRESS NOTES
Marlo Carl is a 83 y.o. male   Patient with a past medical history of HTN, HLD, BPH with LUTS, CKD III. Lumbar Stenosis, Pulmonary Embolism, Vascular Dementia, OAB, Incontinence, Anemia      Has been getting cramps at night in both legs  Bothers him from sleeping  Requip was helpping, but no longer taking    Also c/o runny nose      Feels fine  No chest pain/  SOB/ dizziness  BM OK  Energy level ok  Appetite OK    Did have one fall 3 weeks ago  No injuries                   Review of Systems     Constitutional: no fever, no chills, not feeling poorly, not feeling tired and no recent weight gain, no recent weight loss.   ENT: no earache, no hearing loss, no nosebleeds, no nasal discharge, no sore throat and no hoarseness.   Cardiovascular: the heart rate was not slow, the heart rate was not fast, no chest pain, no palpitations, no intermittent leg claudication and no lower extremity edema.   Respiratory: no cough, wheezing or shortness of breath at rest or exertion  Gastrointestinal: no abdominal pain, no constipation, no melena, no nausea, no diarrhea, no vomiting and no blood in stools.   Musculoskeletal: no arthralgias, no myalgias, no back pain, no joint swelling, no joint stiffness, no limb pain and no limb swelling.   Integumentary: no skin rashes, no skin lesions, no itching, no skin wound and no dry skin.   Neurological: no headache, no confusion, no numbness, no dizziness, no tingling and no fainting.   All other systems have been reviewed and are negative for complaint.       Vitals:    02/06/24 1607   BP: 130/70   Pulse: 68   Resp: 16   Temp: 36.8 °C (98.3 °F)        Physical Exam     Constitutional   General appearance: Alert and in no acute distress.     Pulmonary   Respiratory assessment: No respiratory distress, normal respiratory rhythm and effort.    Auscultation of Lungs: Clear bilateral breath sounds.   Cardiovascular   Auscultation of heart: Apical pulse normal, heart rate and rhythm normal,  normal S1 and S2, no murmurs and no pericardial rub.    Exam for edema: No peripheral edema.   Abdomen   Abdominal Exam: No bruits, normal bowel sounds, soft, non-tender, no abdominal mass palpated.    Liver and Spleen exam: No hepato-splenomegaly.   Musculoskeletal   Examination of gait: Normal.    Inspection of digits and nails: No clubbing or cyanosis of the fingernails.    Inspection/palpation of joints, bones and muscles: No joint swelling. Normal movement of all extremities.   Skin   Skin inspection: Normal skin color and pigmentation, normal skin turgor and no visible rash.   Neurologic   Cranial nerves: Nerves 2-12 were intact, no focal neuro defects.     Assessment/Plan          Patient with a past medical history of HTN, HLD, BPH with LUTS, CKD III. Lumbar Stenosis, Pulmonary Embolism, Vascular Dementia, OAB, Incontinence       # HTN  Elevated  Has been forgetting to take meds  family will make sure of compliance       # CKD III  stable     # HLD  condition is stable  continue current medications     # Vascular Dementia  Gait has gotten smaller  Flat affect  Neurology consulted  Recommend to use hearing aides everyday     # BPH/ Nocturia/ Scrotal swelling  S/p Hydrocele surgery  Urology increased Myrbetriq    # OA of Knees  Gets stiff in AM      # RLS  Restart Requip    Total appt time today was 35  minutes. Time included preparing to see the pt, obtaining the hx, performing the medically necessary appropriate physical exam, counseling & educating the pt, ordering tests & procedures, referring & communicating w/other providers, independently interpreting results & communicating the results to the pt, care, coordination & documenting clinical information in the medical record.

## 2024-02-13 DIAGNOSIS — R39.9 UTI SYMPTOMS: ICD-10-CM

## 2024-02-13 DIAGNOSIS — N32.81 OVERACTIVE BLADDER: ICD-10-CM

## 2024-02-27 ENCOUNTER — OFFICE VISIT (OUTPATIENT)
Dept: UROLOGY | Facility: HOSPITAL | Age: 84
End: 2024-02-27
Payer: MEDICARE

## 2024-02-27 DIAGNOSIS — N39.46 MIXED STRESS AND URGE URINARY INCONTINENCE: Primary | ICD-10-CM

## 2024-02-27 PROCEDURE — 1157F ADVNC CARE PLAN IN RCRD: CPT | Performed by: UROLOGY

## 2024-02-27 PROCEDURE — 51798 US URINE CAPACITY MEASURE: CPT | Performed by: UROLOGY

## 2024-02-27 PROCEDURE — 1126F AMNT PAIN NOTED NONE PRSNT: CPT | Performed by: UROLOGY

## 2024-02-27 PROCEDURE — 1160F RVW MEDS BY RX/DR IN RCRD: CPT | Performed by: UROLOGY

## 2024-02-27 PROCEDURE — 99214 OFFICE O/P EST MOD 30 MIN: CPT | Performed by: UROLOGY

## 2024-02-27 PROCEDURE — 1159F MED LIST DOCD IN RCRD: CPT | Performed by: UROLOGY

## 2024-02-27 ASSESSMENT — PAIN SCALES - GENERAL: PAINLEVEL: 0-NO PAIN

## 2024-02-27 NOTE — PROGRESS NOTES
NPV    Referred by Dr. German for NM     HISTORY OF PRESENT ILLNESS:   Marlo Carl is a 83 y.o. male who is being seen today for consult on neuromodulation    H/o BPH w/ LUTS, OAB symptoms and UUI.  Has failed bladder botox (2019) and myrbetriq 25 mg for his UUI.   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   Has tried condom catheterization in the past, but it would not stay on.  Has tried ISC in the past, however only produced low volumes thus discontinued.  Continues to leak, worse at night, wears precautionary pads for this.      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  PSH botox, hyrocelectomy  FH of cancer, unspecified.   Former smoker (quit in [id number])    AUA SS 26, nocturia 5x, QOL 5  Not sexually active    PAST MEDICAL HISTORY:  Past Medical History:   Diagnosis Date    Acute upper respiratory infection, unspecified 07/27/2018    Viral upper respiratory infection    Constipation, unspecified 07/27/2018    Constipation, acute    Encounter for immunization 07/27/2018    Encounter for administration of vaccine    Frequency of micturition 07/27/2018    Urinary frequency    History of falling 07/27/2018    Risk for falls    Other abnormalities of gait and mobility 10/07/2022    Shuffling gait    Other abnormalities of gait and mobility 03/05/2020    Shuffling gait    Other conditions influencing health status 07/27/2018    Colonoscopy planned    Other spondylosis with myelopathy, cervical region 07/27/2018    Osteoarthritis of cervical spine with myelopathy    Personal history of other diseases of the circulatory system 08/29/2018    History of hypertension    Personal history of transient ischemic attack (TIA), and cerebral infarction without residual deficits     History of stroke    Unspecified urinary incontinence 07/27/2018    Urinary incontinence    Unsteadiness on feet 07/27/2018    Gait instability       PAST SURGICAL HISTORY:  Past Surgical  History:   Procedure Laterality Date    EXCISION / REPAIR HYDROCELE PEDIATRIC      KIDNEY SURGERY  05/09/2014    Kidney Surgery    TRANSURETHRAL RESECTION OF PROSTATE  04/30/2018    Transurethral Resection Of Prostate (TURP)        ALLERGIES:   No Known Allergies     MEDICATIONS:   Current Outpatient Medications   Medication Instructions    aspirin 81 mg EC tablet 1 tablet, oral, Daily    atorvastatin (Lipitor) 20 mg tablet 1 tablet, oral, Daily    cyclobenzaprine (FLEXERIL) 5 mg, oral, 3 times daily PRN    donepezil (ARICEPT) 10 mg, oral, Nightly    krill oil 500 mg capsule oral    mirabegron (MYRBETRIQ) 50 mg, oral, Daily    multivit-minerals/folic acid (CENTRUM ADULTS ORAL) 1 tablet, oral, Daily    NIFEdipine ER (ADALAT CC) 90 mg, oral, Daily    rOPINIRole (REQUIP) 0.5 mg, oral, Nightly        PHYSICAL EXAM:    Constitutional: Patient appears well-developed and well-nourished. No distress.    Neurological: Alert and oriented to person, place, and time.  Psychiatric: Normal mood and affect. Behavior is normal. Thought content normal.      Labs  Lab Results   Component Value Date    PSA 2.44 03/12/2022     Lab Results   Component Value Date    GFRMALE 39 (A) 10/15/2022     Lab Results   Component Value Date    CREATININE 1.70 (H) 10/15/2022     Lab Results   Component Value Date    CHOL 132 10/25/2023     Lab Results   Component Value Date    HDL 49 (A) 10/25/2023     Lab Results   Component Value Date    CHHDL 2.7 10/25/2023     Lab Results   Component Value Date    TRIG 135 10/25/2023     Lab Results   Component Value Date    HCT 39.5 (L) 10/15/2022       PVR 35cc      Assessment:      1. Mixed stress and urge urinary incontinence          Marlo Carl is a 83 y.o. male here for FUV     Plan:   Patient continues to have bothersome urinary symptoms despite conservative measures and had tried at least 2 medications (anti-cholinergic or beta-agonist).  We discussed OAB pathway as above.  We went into detail about  [employer] neuromodulation.  We discussed that this is a long-term treatment that helps to restore communication between the brain, bladder and bowel.    We discussed the peripheral nerve evaluation (PNE).  That it would be done in the office setting with local anesthetic.  Two tiny wires, or leads, would be inserted in the upper buttock.  This would be guided by bony landmarks and patients sensory responses.  Once we confirmed the correct location the leads would be connected to an external device and secured to the patients back.  They would return home with no major restrictions, just no showering or strenuous activity.  They will keep a voiding diary to monitor their responses. They will follow up in office the week after to have the leads removed and review the voiding diary.  A successful trial is deemed if >50% response.    If we decide to move forward with permanent implant that will be done in the operating room with light sedation.  A permanent lead would be placed under fluoroscopic guidance to confirm correct positioning.  This is then connected to an internal pulse generator which can last up to 20 years.    Risks of change in sensation, pain, irritation, bleeding, movement of the lead and implant infection discussed.    Patient in agreement and wants to proceed.       Plan for office PNE

## 2024-02-27 NOTE — LETTER
February 27, 2024     Avinash German MD  [id number]  Department Of Urology  [facility] [address]    Patient: Marlo Carl   YOB: 1940   Date of Visit: 2/27/2024       Dear Dr. Avinash German MD:    Thank you for referring Marlo Carl to me for evaluation. Below are my notes for this consultation.  If you have questions, please do not hesitate to call me. I look forward to following your patient along with you.       Sincerely,     Vishal Delgado MD      CC: No Recipients  ______________________________________________________________________________________    NPV    Referred by Dr. German for NM     HISTORY OF PRESENT ILLNESS:   Marlo Carl is a 83 y.o. male who is being seen today for consult on neuromodulation    H/o BPH w/ LUTS, OAB symptoms and UUI.  Has failed bladder botox (2019) and myrbetriq 25 mg for his UUI.   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   Has tried condom catheterization in the past, but it would not stay on.  Has tried ISC in the past, however only produced low volumes thus discontinued.  Continues to leak, worse at night, wears precautionary pads for this.      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  PSH botox, hyrocelectomy  FH of cancer, unspecified.   Former smoker (quit in [id number])    AUA SS 26, nocturia 5x, QOL 5  Not sexually active    PAST MEDICAL HISTORY:  Past Medical History:   Diagnosis Date   • Acute upper respiratory infection, unspecified 07/27/2018    Viral upper respiratory infection   • Constipation, unspecified 07/27/2018    Constipation, acute   • Encounter for immunization 07/27/2018    Encounter for administration of vaccine   • Frequency of micturition 07/27/2018    Urinary frequency   • History of falling 07/27/2018    Risk for falls   • Other abnormalities of gait and mobility 10/07/2022    Shuffling gait   • Other abnormalities of gait and mobility 03/05/2020    Shuffling gait    • Other conditions influencing health status 07/27/2018    Colonoscopy planned   • Other spondylosis with myelopathy, cervical region 07/27/2018    Osteoarthritis of cervical spine with myelopathy   • Personal history of other diseases of the circulatory system 08/29/2018    History of hypertension   • Personal history of transient ischemic attack (TIA), and cerebral infarction without residual deficits     History of stroke   • Unspecified urinary incontinence 07/27/2018    Urinary incontinence   • Unsteadiness on feet 07/27/2018    Gait instability       PAST SURGICAL HISTORY:  Past Surgical History:   Procedure Laterality Date   • EXCISION / REPAIR HYDROCELE PEDIATRIC     • KIDNEY SURGERY  05/09/2014    Kidney Surgery   • TRANSURETHRAL RESECTION OF PROSTATE  04/30/2018    Transurethral Resection Of Prostate (TURP)        ALLERGIES:   No Known Allergies     MEDICATIONS:   Current Outpatient Medications   Medication Instructions   • aspirin 81 mg EC tablet 1 tablet, oral, Daily   • atorvastatin (Lipitor) 20 mg tablet 1 tablet, oral, Daily   • cyclobenzaprine (FLEXERIL) 5 mg, oral, 3 times daily PRN   • donepezil (ARICEPT) 10 mg, oral, Nightly   • krill oil 500 mg capsule oral   • mirabegron (MYRBETRIQ) 50 mg, oral, Daily   • multivit-minerals/folic acid (CENTRUM ADULTS ORAL) 1 tablet, oral, Daily   • NIFEdipine ER (ADALAT CC) 90 mg, oral, Daily   • rOPINIRole (REQUIP) 0.5 mg, oral, Nightly        PHYSICAL EXAM:    Constitutional: Patient appears well-developed and well-nourished. No distress.    Neurological: Alert and oriented to person, place, and time.  Psychiatric: Normal mood and affect. Behavior is normal. Thought content normal.      Labs  Lab Results   Component Value Date    PSA 2.44 03/12/2022     Lab Results   Component Value Date    GFRMALE 39 (A) 10/15/2022     Lab Results   Component Value Date    CREATININE 1.70 (H) 10/15/2022     Lab Results   Component Value Date    CHOL 132 10/25/2023     Lab  Results   Component Value Date    HDL 49 (A) 10/25/2023     Lab Results   Component Value Date    CHHDL 2.7 10/25/2023     Lab Results   Component Value Date    TRIG 135 10/25/2023     Lab Results   Component Value Date    HCT 39.5 (L) 10/15/2022       PVR 35cc      Assessment:      1. Mixed stress and urge urinary incontinence          Marlo Carl is a 83 y.o. male here for FUV     Plan:   Patient continues to have bothersome urinary symptoms despite conservative measures and had tried at least 2 medications (anti-cholinergic or beta-agonist).  We discussed OAB pathway as above.  We went into detail about [employer] neuromodulation.  We discussed that this is a long-term treatment that helps to restore communication between the brain, bladder and bowel.    We discussed the peripheral nerve evaluation (PNE).  That it would be done in the office setting with local anesthetic.  Two tiny wires, or leads, would be inserted in the upper buttock.  This would be guided by bony landmarks and patients sensory responses.  Once we confirmed the correct location the leads would be connected to an external device and secured to the patients back.  They would return home with no major restrictions, just no showering or strenuous activity.  They will keep a voiding diary to monitor their responses. They will follow up in office the week after to have the leads removed and review the voiding diary.  A successful trial is deemed if >50% response.    If we decide to move forward with permanent implant that will be done in the operating room with light sedation.  A permanent lead would be placed under fluoroscopic guidance to confirm correct positioning.  This is then connected to an internal pulse generator which can last up to 20 years.    Risks of change in sensation, pain, irritation, bleeding, movement of the lead and implant infection discussed.    Patient in agreement and wants to proceed.       Plan for office PNE

## 2024-03-08 ENCOUNTER — PROCEDURE VISIT (OUTPATIENT)
Dept: UROLOGY | Facility: HOSPITAL | Age: 84
End: 2024-03-08
Payer: MEDICARE

## 2024-03-08 DIAGNOSIS — N39.46 MIXED STRESS AND URGE URINARY INCONTINENCE: ICD-10-CM

## 2024-03-08 DIAGNOSIS — R35.0 URINARY FREQUENCY: Primary | ICD-10-CM

## 2024-03-08 PROCEDURE — 64561 IMPLANT NEUROELECTRODES: CPT | Performed by: UROLOGY

## 2024-03-08 PROCEDURE — 64561 IMPLANT NEUROELECTRODES: CPT | Mod: 50 | Performed by: UROLOGY

## 2024-03-08 ASSESSMENT — PAIN SCALES - GENERAL: PAINLEVEL: 0-NO PAIN

## 2024-03-08 NOTE — PROGRESS NOTES
82 yo male with OAB, UUI.  Failed botox, beta-agonists and conservative measures.      After surgical consent was reviewed with the patient, including risks, benefits and alternatives the patient expressed desire to proceed with the basic evaluation.     The patient was then taken to the procedure room and placed in the prone position. Pillows were placed under the lower abdomen and hips to level and flatten the sacrum and allow the toes to dangle freely.  A ground pad was placed on the bottom of the patient's foot and was connected to the Clinician  along with the test stimulation cable (J-hook). The patient was then prepped and draped in the usual fashion.      Attention was turned to placement of the percutaneous lead wire using the [employer] system. The level of S3 and the medial border of the sacral foramen were using bony landmarks. After administration of local anesthesia, a foramen needle was placed in the superior, medial aspect of the S3 foramen such that the needle was parallel to medial border of the foramen. The needle was advanced such that the tip of the foramen needle was just at the anterior aspect of the sacrum. The J-hook was then placed on the uninsulated portion of the foramen needle and stimulation applied to obtain the opening threshold amplitude. Sensory response was noted to be in the seat area and reported to be comfortable.     The foramen needle was then advanced approximately 1-2 cm and retested at that depth to insure continued motor/sensory response.  the percutaneous lead was placed through the foramen needle. The foramen needle was retracted over the percutaneous lead.     The procedure was then repeated on the contralateral side.     The percutaneous leads were then coiled and a 2x2 cm gauze placed over each followed by a small Tegaderm. The PNE leads were then connected to the ground pad and basic trial cable, placing the ground pad on skin close to the Tegaderm. The basic  trial cable was connected to external trial system (ETS) and impedances were checked.  A cable strain relief was created and covered with a medium to large Tegaderm. This was accomplished bilaterally. A large Tegaderm was placed to cover the connections and any exposed leads.     Patient tolerated the procedure with no complications and was instructed on the use of the patient remote in the post-procedure area.

## 2024-03-11 ENCOUNTER — TELEPHONE (OUTPATIENT)
Dept: UROLOGY | Facility: HOSPITAL | Age: 84
End: 2024-03-11
Payer: MEDICARE

## 2024-03-11 ENCOUNTER — DOCUMENTATION (OUTPATIENT)
Dept: UROLOGY | Facility: HOSPITAL | Age: 84
End: 2024-03-11
Payer: MEDICARE

## 2024-03-11 NOTE — PROGRESS NOTES
Patient came in office today 3/11/2024 for a nurse visit. Patient had [employer] PNE placed in the office on 3/8/24. Patient had concerns with dressing. [employer] rep and nurse noted the dressing was slightly removed and one of the leads that was inserted on 3/9/24 had migrated out over the weekend. Md aware. Nurse had orders to remove PNE lead in the office. Patient tolerated removal well and a bandage was placed over the site. Patient has follow up appointment with MD on 3/14/24 to further eval situation .

## 2024-03-14 ENCOUNTER — OFFICE VISIT (OUTPATIENT)
Dept: UROLOGY | Facility: HOSPITAL | Age: 84
End: 2024-03-14
Payer: MEDICARE

## 2024-03-14 DIAGNOSIS — N32.81 OVERACTIVE BLADDER: Primary | ICD-10-CM

## 2024-03-14 DIAGNOSIS — R79.1 ABNORMAL COAGULATION PROFILE: ICD-10-CM

## 2024-03-14 DIAGNOSIS — N39.41 URGE INCONTINENCE OF URINE: ICD-10-CM

## 2024-03-14 DIAGNOSIS — R82.90 UNSPECIFIED ABNORMAL FINDINGS IN URINE: ICD-10-CM

## 2024-03-14 PROCEDURE — 1157F ADVNC CARE PLAN IN RCRD: CPT | Performed by: UROLOGY

## 2024-03-14 PROCEDURE — 1126F AMNT PAIN NOTED NONE PRSNT: CPT | Performed by: UROLOGY

## 2024-03-14 PROCEDURE — 1159F MED LIST DOCD IN RCRD: CPT | Performed by: UROLOGY

## 2024-03-14 PROCEDURE — 99024 POSTOP FOLLOW-UP VISIT: CPT | Performed by: UROLOGY

## 2024-03-14 PROCEDURE — 1160F RVW MEDS BY RX/DR IN RCRD: CPT | Performed by: UROLOGY

## 2024-03-14 RX ORDER — CHLORHEXIDINE GLUCONATE 40 MG/ML
SOLUTION TOPICAL DAILY PRN
Status: CANCELLED | OUTPATIENT
Start: 2024-03-14

## 2024-03-14 RX ORDER — SODIUM CHLORIDE, SODIUM LACTATE, POTASSIUM CHLORIDE, CALCIUM CHLORIDE 600; 310; 30; 20 MG/100ML; MG/100ML; MG/100ML; MG/100ML
100 INJECTION, SOLUTION INTRAVENOUS CONTINUOUS
Status: CANCELLED | OUTPATIENT
Start: 2024-03-14

## 2024-03-14 RX ORDER — CEFAZOLIN SODIUM 2 G/100ML
2 INJECTION, SOLUTION INTRAVENOUS ONCE
Status: CANCELLED | OUTPATIENT
Start: 2024-03-14 | End: 2024-03-14

## 2024-03-14 ASSESSMENT — PAIN SCALES - GENERAL: PAINLEVEL: 0-NO PAIN

## 2024-03-14 NOTE — PROGRESS NOTES
FUV     HISTORY OF PRESENT ILLNESS:   Marlo Carl is a 83 y.o. male who is being seen today for fuv    H/o BPH w/ LUTS, OAB symptoms and UUI.  Has failed bladder botox (2019) and myrbetriq 25 mg for his UUI.   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   Has tried condom catheterization in the past, but it would not stay on.  Has tried ISC in the past, however only produced low volumes thus discontinued.  Continues to leak, worse at night, wears precautionary pads for this.      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  PSH botox, hyrocelectomy  FH of cancer, unspecified  Former smoker (quit in [id number])    AUA SS 26, nocturia 5x, QOL 5  Not sexually active    Had [employer] PNE in office on 3/8/24.  Had one good day then leads accidentally got dislodged.  Removed in office on 3/11/24    They do think that it helped.  Was wetting less at night.      PAST MEDICAL HISTORY:  Past Medical History:   Diagnosis Date    Acute upper respiratory infection, unspecified 07/27/2018    Viral upper respiratory infection    Constipation, unspecified 07/27/2018    Constipation, acute    Encounter for immunization 07/27/2018    Encounter for administration of vaccine    Frequency of micturition 07/27/2018    Urinary frequency    History of falling 07/27/2018    Risk for falls    Other abnormalities of gait and mobility 10/07/2022    Shuffling gait    Other abnormalities of gait and mobility 03/05/2020    Shuffling gait    Other conditions influencing health status 07/27/2018    Colonoscopy planned    Other spondylosis with myelopathy, cervical region 07/27/2018    Osteoarthritis of cervical spine with myelopathy    Personal history of other diseases of the circulatory system 08/29/2018    History of hypertension    Personal history of transient ischemic attack (TIA), and cerebral infarction without residual deficits     History of stroke    Unspecified urinary incontinence  07/27/2018    Urinary incontinence    Unsteadiness on feet 07/27/2018    Gait instability       PAST SURGICAL HISTORY:  Past Surgical History:   Procedure Laterality Date    EXCISION / REPAIR HYDROCELE PEDIATRIC      KIDNEY SURGERY  05/09/2014    Kidney Surgery    TRANSURETHRAL RESECTION OF PROSTATE  04/30/2018    Transurethral Resection Of Prostate (TURP)        ALLERGIES:   No Known Allergies     MEDICATIONS:   Current Outpatient Medications   Medication Instructions    aspirin 81 mg EC tablet 1 tablet, oral, Daily    atorvastatin (Lipitor) 20 mg tablet 1 tablet, oral, Daily    cyclobenzaprine (FLEXERIL) 5 mg, oral, 3 times daily PRN    donepezil (ARICEPT) 10 mg, oral, Nightly    krill oil 500 mg capsule oral    mirabegron (MYRBETRIQ) 50 mg, oral, Daily    multivit-minerals/folic acid (CENTRUM ADULTS ORAL) 1 tablet, oral, Daily    NIFEdipine ER (ADALAT CC) 90 mg, oral, Daily    rOPINIRole (REQUIP) 0.5 mg, oral, Nightly        PHYSICAL EXAM:    Constitutional: Patient appears well-developed and well-nourished. No distress.    Neurological: Alert and oriented to person, place, and time.  Psychiatric: Normal mood and affect. Behavior is normal. Thought content normal.      Labs  Lab Results   Component Value Date    PSA 2.44 03/12/2022     Lab Results   Component Value Date    GFRMALE 39 (A) 10/15/2022     Lab Results   Component Value Date    CREATININE 1.70 (H) 10/15/2022     Lab Results   Component Value Date    CHOL 132 10/25/2023     Lab Results   Component Value Date    HDL 49 (A) 10/25/2023     Lab Results   Component Value Date    CHHDL 2.7 10/25/2023     Lab Results   Component Value Date    TRIG 135 10/25/2023     Lab Results   Component Value Date    HCT 39.5 (L) 10/15/2022       Assessment:      1. Overactive bladder          Marlo Carl is a 83 y.o. male here for FUV     Plan:   Family feels like he had some improvement with Axonic PNE but unfortanetly was inconclusive trial due to leads getting  dislodged early.      Will plan for Stage1 in OR followed by stage 2 or removal 1wk later    Patient continues to have bothersome urinary symptoms despite conservative measures and had tried at least 2 medications (anti-cholinergic or beta-agonist).  We discussed OAB pathway as above.  We went into detail about [employer] neuromodulation.  We discussed that this is a long-term treatment that helps to restore communication between the brain, bladder and bowel.    We discussed the peripheral nerve evaluation (PNE).  That it would be done in the office setting with local anesthetic.  Two tiny wires, or leads, would be inserted in the upper buttock.  This would be guided by bony landmarks and patients sensory responses.  Once we confirmed the correct location the leads would be connected to an external device and secured to the patients back.  They would return home with no major restrictions, just no showering or strenuous activity.  They will keep a voiding diary to monitor their responses. They will follow up in office the week after to have the leads removed and review the voiding diary.  A successful trial is deemed if >50% response.    If we decide to move forward with permanent implant that will be done in the operating room with light sedation.  A permanent lead would be placed under fluoroscopic guidance to confirm correct positioning.  This is then connected to an internal pulse generator which can last up to 20 years.    Risks of change in sensation, pain, irritation, bleeding, movement of the lead and implant infection discussed.    Patient in agreement and wants to proceed.

## 2024-03-27 ENCOUNTER — TELEMEDICINE CLINICAL SUPPORT (OUTPATIENT)
Dept: PREADMISSION TESTING | Facility: HOSPITAL | Age: 84
End: 2024-03-27
Payer: MEDICARE

## 2024-03-27 DIAGNOSIS — N32.81 OVERACTIVE BLADDER: ICD-10-CM

## 2024-03-27 RX ORDER — MELATONIN 5 MG
1 CAPSULE ORAL NIGHTLY PRN
COMMUNITY

## 2024-03-27 RX ORDER — DOCUSATE SODIUM 100 MG/1
100 CAPSULE, LIQUID FILLED ORAL DAILY
COMMUNITY

## 2024-04-04 ENCOUNTER — LAB (OUTPATIENT)
Dept: LAB | Facility: LAB | Age: 84
End: 2024-04-04
Payer: MEDICARE

## 2024-04-04 ENCOUNTER — PRE-ADMISSION TESTING (OUTPATIENT)
Dept: PREADMISSION TESTING | Facility: HOSPITAL | Age: 84
End: 2024-04-04
Payer: MEDICARE

## 2024-04-04 VITALS
TEMPERATURE: 95.4 F | HEART RATE: 62 BPM | BODY MASS INDEX: 24.89 KG/M2 | HEIGHT: 68 IN | DIASTOLIC BLOOD PRESSURE: 88 MMHG | RESPIRATION RATE: 18 BRPM | OXYGEN SATURATION: 100 % | SYSTOLIC BLOOD PRESSURE: 132 MMHG | WEIGHT: 164.24 LBS

## 2024-04-04 DIAGNOSIS — F01.A0 MILD VASCULAR DEMENTIA WITHOUT BEHAVIORAL DISTURBANCE, PSYCHOTIC DISTURBANCE, MOOD DISTURBANCE, OR ANXIETY (MULTI): ICD-10-CM

## 2024-04-04 DIAGNOSIS — I10 PRIMARY HYPERTENSION: ICD-10-CM

## 2024-04-04 DIAGNOSIS — Z01.818 PREOP TESTING: Primary | ICD-10-CM

## 2024-04-04 DIAGNOSIS — R79.1 ABNORMAL COAGULATION PROFILE: ICD-10-CM

## 2024-04-04 DIAGNOSIS — N32.81 OVERACTIVE BLADDER: ICD-10-CM

## 2024-04-04 LAB
ALBUMIN SERPL BCP-MCNC: 4.2 G/DL (ref 3.4–5)
ALP SERPL-CCNC: 102 U/L (ref 33–136)
ALT SERPL W P-5'-P-CCNC: 20 U/L (ref 10–52)
ANION GAP SERPL CALC-SCNC: 12 MMOL/L (ref 10–20)
APTT PPP: 27 SECONDS (ref 27–38)
AST SERPL W P-5'-P-CCNC: 20 U/L (ref 9–39)
BILIRUB SERPL-MCNC: 0.4 MG/DL (ref 0–1.2)
BUN SERPL-MCNC: 37 MG/DL (ref 6–23)
CALCIUM SERPL-MCNC: 9.4 MG/DL (ref 8.6–10.3)
CHLORIDE SERPL-SCNC: 103 MMOL/L (ref 98–107)
CO2 SERPL-SCNC: 28 MMOL/L (ref 21–32)
CREAT SERPL-MCNC: 1.96 MG/DL (ref 0.5–1.3)
EGFRCR SERPLBLD CKD-EPI 2021: 33 ML/MIN/1.73M*2
ERYTHROCYTE [DISTWIDTH] IN BLOOD BY AUTOMATED COUNT: 15.9 % (ref 11.5–14.5)
GLUCOSE SERPL-MCNC: 94 MG/DL (ref 74–99)
HCT VFR BLD AUTO: 41.8 % (ref 41–52)
HGB BLD-MCNC: 13.1 G/DL (ref 13.5–17.5)
INR PPP: 1 (ref 0.9–1.1)
MCH RBC QN AUTO: 27 PG (ref 26–34)
MCHC RBC AUTO-ENTMCNC: 31.3 G/DL (ref 32–36)
MCV RBC AUTO: 86 FL (ref 80–100)
NRBC BLD-RTO: 0 /100 WBCS (ref 0–0)
PLATELET # BLD AUTO: 233 X10*3/UL (ref 150–450)
POTASSIUM SERPL-SCNC: 4.2 MMOL/L (ref 3.5–5.3)
PROT SERPL-MCNC: 8.4 G/DL (ref 6.4–8.2)
PROTHROMBIN TIME: 11 SECONDS (ref 9.8–12.8)
RBC # BLD AUTO: 4.86 X10*6/UL (ref 4.5–5.9)
SODIUM SERPL-SCNC: 139 MMOL/L (ref 136–145)
TSH SERPL-ACNC: 1.61 MIU/L (ref 0.44–3.98)
WBC # BLD AUTO: 4.1 X10*3/UL (ref 4.4–11.3)

## 2024-04-04 PROCEDURE — 99203 OFFICE O/P NEW LOW 30 MIN: CPT | Performed by: PHYSICIAN ASSISTANT

## 2024-04-04 PROCEDURE — 85027 COMPLETE CBC AUTOMATED: CPT

## 2024-04-04 PROCEDURE — 36415 COLL VENOUS BLD VENIPUNCTURE: CPT

## 2024-04-04 PROCEDURE — 85610 PROTHROMBIN TIME: CPT

## 2024-04-04 PROCEDURE — 85730 THROMBOPLASTIN TIME PARTIAL: CPT

## 2024-04-04 PROCEDURE — 84443 ASSAY THYROID STIM HORMONE: CPT

## 2024-04-04 PROCEDURE — 87081 CULTURE SCREEN ONLY: CPT | Mod: AHULAB | Performed by: PHYSICIAN ASSISTANT

## 2024-04-04 PROCEDURE — 80053 COMPREHEN METABOLIC PANEL: CPT

## 2024-04-04 PROCEDURE — 93005 ELECTROCARDIOGRAM TRACING: CPT | Performed by: PHYSICIAN ASSISTANT

## 2024-04-04 RX ORDER — CHLORHEXIDINE GLUCONATE ORAL RINSE 1.2 MG/ML
SOLUTION DENTAL
Qty: 473 ML | Refills: 0 | Status: SHIPPED | OUTPATIENT
Start: 2024-04-04

## 2024-04-04 ASSESSMENT — ENCOUNTER SYMPTOMS
CONSTITUTIONAL NEGATIVE: 1
FREQUENCY: 1
PSYCHIATRIC NEGATIVE: 1
HEMATOLOGIC/LYMPHATIC NEGATIVE: 1
ALLERGIC/IMMUNOLOGIC NEGATIVE: 1
MUSCULOSKELETAL NEGATIVE: 1
RESPIRATORY NEGATIVE: 1
CARDIOVASCULAR NEGATIVE: 1
ENDOCRINE NEGATIVE: 1
EYES NEGATIVE: 1
NEUROLOGICAL NEGATIVE: 1
GASTROINTESTINAL NEGATIVE: 1

## 2024-04-04 NOTE — PREPROCEDURE INSTRUCTIONS
Medication List            Accurate as of April 4, 2024  1:53 PM. Always use your most recent med list.                aspirin 81 mg EC tablet  Medication Adjustments for Surgery: Take morning of surgery with sip of water, no other fluids     atorvastatin 20 mg tablet  Commonly known as: Lipitor  Medication Adjustments for Surgery: Take morning of surgery with sip of water, no other fluids     CENTRUM ADULTS ORAL  Medication Adjustments for Surgery: Stop 7 days before surgery     CRANBERRY CONCENTRATE ORAL  Medication Adjustments for Surgery: Stop 7 days before surgery     cyclobenzaprine 5 mg tablet  Commonly known as: Flexeril  Take 1 tablet (5 mg) by mouth 3 times a day as needed for muscle spasms.  Notes to patient: Use if needed morning of surgery        diphenhydrAMINE-acetaminophen  mg per tablet  Commonly known as: Tylenol PM  Medication Adjustments for Surgery: Continue until night before surgery     docusate sodium 100 mg capsule  Commonly known as: Colace  Medication Adjustments for Surgery: Continue until night before surgery     donepezil 10 mg tablet  Commonly known as: Aricept  Take 1 tablet (10 mg) by mouth once daily at bedtime.  Notes to patient: Continue night prior to surgery     krill oil 500 mg capsule  Medication Adjustments for Surgery: Stop 7 days before surgery     melatonin 5 mg capsule  Notes to patient: Continue night prior to surgery     mirabegron 50 mg tablet extended release 24 hr 24 hr tablet  Commonly known as: Myrbetriq  Take 1 tablet (50 mg) by mouth once daily.  Medication Adjustments for Surgery: Continue until night before surgery     NIFEdipine ER 90 mg 24 hr tablet  Commonly known as: Adalat CC  Take 1 tablet (90 mg) by mouth once daily.  Medication Adjustments for Surgery: Take morning of surgery with sip of water, no other fluids     rOPINIRole 0.5 mg tablet  Commonly known as: Requip  Take 1 tablet (0.5 mg) by mouth once daily at bedtime.  Notes to patient:  Continue night prior to surgery                 Concerning above medication instructions - If medication is normally taken at night continue normal schedule - do not take night prior and morning of surgery.     CONTACT SURGEON'S OFFICE IF YOU DEVELOP:  * Fever = 100.4 F   * New respiratory symptoms (e.g. cough, shortness of breath, respiratory distress, sore throat)  * Recent loss of taste or smell  *Flu like symptoms such as headache, fatigue or gastrointestinal symptoms  * You develop any open sores, shingles, burning or painful urination   AND/OR:  * You no longer wish to have the surgery.  * Any other personal circumstances change that may lead to the need to cancel or defer this surgery.  *You were admitted to any hospital within one week of your planned procedure.    SMOKING:  *Quitting smoking can make a huge difference to your health and recovery from surgery.    *If you need help with quitting, call 9-232-QUIT-NOW.    THE DAY BEFORE SURGERY:  *Do not eat any food after midnight the night before surgery/procedure.   *You are permitted to drink clear liquids (i.e. water, black coffee/tea, (no milk or cream) apple juice, and electrolyte drinks (Gatorade)10 ounces up to 2 hours before your instructed arrival time to the hospital.  *You may chew gum until  2 hours before your surgery/procedure.    SURGICAL TIME  *You will be contacted between 2 p.m. and 6 p.m. the business day before your surgery with your arrival time.  *If you haven't received a call by 6pm, call [phone].  *Scheduled surgery times may change and you will be notified if this occurs-check your personal voicemail for any updates.    ON THE MORNING OF SURGERY:  *Wear comfortable, loose fitting clothing.   *Do not use moisturizers, creams, lotions or perfume.  *All jewelry and valuables should be left at home.  *Prosthetic devices such as contact lenses, hearing aids, dentures, eyelash extensions, hairpins and body piercing must be removed  before surgery.    BRING WITH YOU:  *Photo ID and insurance card  *Current list of medicines and allergies  *Pacemaker/Defibrillator/Heart stent cards  *CPAP machine and mask  *Slings/splints/crutches  *Copy of your complete Advanced Directive/DHPOA-if applicable  *Neurostimulator implant remote    PARKING AND ARRIVAL:  *Check in at the Main Entrance desk and let them know you are here for surgery.  *You will be directed to the 2nd floor surgical waiting area.    AFTER OUTPATIENT SURGERY:  *A responsible adult MUST accompany you at the time of discharge and stay with you for 24 hours after your surgery.  *You may NOT drive yourself home after surgery.  *You may use a taxi or ride sharing service ([employer], Uber) to return home ONLY if you are accompanied by a friend or family member.  *Instructions for resuming your medications will be provided by your surgeon.

## 2024-04-04 NOTE — CPM/PAT H&P
[facility]/PAT Evaluation       Name: Marlo Carl (Marlo Carl)  /Age: 1940/83 y.o.     In-Person       Chief Complaint: Overactive bladder / Urge incontinence of urine     HPI    Date of Consult: 24    Referring Provider:      Surgery, Date, and Length: Stage 1 Insertion Continence Control Stimulator Lead ; 24; 60 minutes     Marlo Carl  is a 83 year-old male  who presents to the [facility] for perioperative risk assessment prior to surgery.  Patient with  BPH w/ LUTS, OAB symptoms and  urge incontinence. Has failed bladder botox () and myrbetriq 25 mg for his UUI.   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  Has tried condom catheterization in the past, but it would not stay on. Has tried ISC in the past, however only produced low volumes thus discontinued.  Continues to leak, worse at night, wears precautionary pads for this.     This note was created in part upon personal review of patient's medical records.      Patient is scheduled to have Stage 1 Insertion Continence Control Stimulator Lead     Medical History  Past Medical History:   Diagnosis Date    Anemia     Arthritis     BPH with obstruction/lower urinary tract symptoms     Cerebral vascular accident (CMS/formerly [facility])     ,  w/ residual left sided weakness, CT Head 22  Large area of encephalomalacia of the right MCA territory, unchanged since prior. 3. Advanced chronic microvascular ischemic changes and chronic left basal ganglia lacunar infarct.    CKD (chronic kidney disease)     Cognitive decline     Dementia (CMS/formerly [facility])     on donzepril    Depression     Hearing aid worn     refuses to wear    HL (hearing loss)     Hypertension     OAB (overactive bladder)     PE (pulmonary thromboembolism) (CMS/formerly [facility]) 2018    off anticoags    Resting tremor     mild hands    RLS (restless legs syndrome)     Spinal stenosis     Unspecified urinary incontinence         STOP BANG =  3   (male, >51 yo, htn)    Caprini =  8    (age,surgery,hx PE)    Surgical History  Past Surgical History:   Procedure Laterality Date    CARPAL TUNNEL RELEASE Bilateral     CATARACT EXTRACTION Bilateral 2006    EXCISION / REPAIR HYDROCELE PEDIATRIC Left 2022    TRANSURETHRAL RESECTION OF PROSTATE  2016             Family history:  Family History   Problem Relation Name Age of Onset    Diabetes Mother      Heart attack Mother      Cancer Father      Other (silicosis) Father      Heart attack Brother          Social history:  Social History     Socioeconomic History    Marital status:      Spouse name: Not on file    Number of children: Not on file    Years of education: Not on file    Highest education level: Not on file   Occupational History    Not on file   Tobacco Use    Smoking status: Former     Packs/day: .5     Types: Cigarettes     Quit date: 10/1/1992     Years since quittin.5    Smokeless tobacco: Never   Vaping Use    Vaping Use: Never used   Substance and Sexual Activity    Alcohol use: Yes     Alcohol/week: 1.0 standard drink of alcohol     Types: 1 Cans of beer per week     Comment: occassional beer    Drug use: Not Currently    Sexual activity: Not on file   Other Topics Concern    Not on file   Social History Narrative    Not on file     Social Determinants of Health     Financial Resource Strain: Not on file   Food Insecurity: Not on file   Transportation Needs: Not on file   Physical Activity: Not on file   Stress: Not on file   Social Connections: Not on file   Intimate Partner Violence: Not on file   Housing Stability: Not on file          Current Outpatient Medications:     aspirin 81 mg EC tablet, Take 1 tablet (81 mg) by mouth once daily., Disp: , Rfl:     atorvastatin (Lipitor) 20 mg tablet, Take 1 tablet (20 mg) by mouth once daily., Disp: , Rfl:     cranberry fruit extract (CRANBERRY CONCENTRATE ORAL), Take 30,000 mg by mouth once daily., Disp: , Rfl:     diphenhydrAMINE-acetaminophen (Tylenol PM)  mg per  "tablet, Take 1 tablet by mouth as needed at bedtime for sleep., Disp: , Rfl:     docusate sodium (Colace) 100 mg capsule, Take 1 capsule (100 mg) by mouth once daily., Disp: , Rfl:     donepezil (Aricept) 10 mg tablet, Take 1 tablet (10 mg) by mouth once daily at bedtime., Disp: 30 tablet, Rfl: 2    krill oil 500 mg capsule, Take by mouth., Disp: , Rfl:     melatonin 5 mg capsule, Take 1 capsule (5 mg) by mouth as needed at bedtime., Disp: , Rfl:     mirabegron (Myrbetriq) 50 mg tablet extended release 24 hr 24 hr tablet, Take 1 tablet (50 mg) by mouth once daily., Disp: 30 tablet, Rfl: 11    multivit-minerals/folic acid (CENTRUM ADULTS ORAL), Take 1 tablet by mouth once daily., Disp: , Rfl:     NIFEdipine CC 90 mg 24 hr tablet, Take 1 tablet (90 mg) by mouth once daily., Disp: 90 tablet, Rfl: 2    rOPINIRole (Requip) 0.5 mg tablet, Take 1 tablet (0.5 mg) by mouth once daily at bedtime., Disp: 30 tablet, Rfl: 1    chlorhexidine (Peridex) 0.12 % solution, 15 ml swish and spit for 30 seconds night prior to surgery and morning of surgery, Disp: 473 mL, Rfl: 0    cyclobenzaprine (Flexeril) 5 mg tablet, Take 1 tablet (5 mg) by mouth 3 times a day as needed for muscle spasms. (Patient not taking: Reported on 3/27/2024), Disp: 30 tablet, Rfl: 2     Visit Vitals  /88   Pulse 62   Temp 35.2 °C (95.4 °F)   Resp 18   Ht 1.715 m (5' 7.5\")   Wt 74.5 kg (164 lb 3.9 oz)   SpO2 100%   BMI 25.34 kg/m²   Smoking Status Former   BSA 1.88 m²         Review of Systems   Constitutional: Negative.         Ambulates with rolling walker    HENT: Negative.     Eyes: Negative.    Respiratory: Negative.     Cardiovascular: Negative.         METS 3      +stairs / walk around in the house without SOB/chest pain   Gastrointestinal: Negative.    Endocrine: Negative.    Genitourinary:  Positive for frequency and urgency.   Musculoskeletal: Negative.    Skin: Negative.    Allergic/Immunologic: Negative.    Neurological: Negative.  "   Hematological: Negative.    Psychiatric/Behavioral: Negative.          Physical Exam  Constitutional:       Appearance: Normal appearance.   HENT:      Head: Normocephalic and atraumatic.      Right Ear: External ear normal.      Left Ear: External ear normal.      Nose: Nose normal.      Mouth/Throat:      Pharynx: Oropharynx is clear.   Eyes:      Conjunctiva/sclera: Conjunctivae normal.   Neck:      Comments: Decreased ROM  Cardiovascular:      Rate and Rhythm: Normal rate and regular rhythm.      Heart sounds: Normal heart sounds.   Pulmonary:      Effort: Pulmonary effort is normal.      Breath sounds: Normal breath sounds.   Abdominal:      General: Abdomen is flat.      Palpations: Abdomen is soft.   Musculoskeletal:         General: Normal range of motion.   Skin:     General: Skin is warm and dry.   Neurological:      General: No focal deficit present.      Mental Status: He is alert and oriented to person, place, and time.   Psychiatric:         Mood and Affect: Mood normal.         Behavior: Behavior normal.         Thought Content: Thought content normal.         Judgment: Judgment normal.          PAT AIRWAY:   Airway:     Mallampati::  I    Neck ROM::  Limited    Lab Results   Component Value Date    WBC 4.1 (L) 04/04/2024    HGB 13.1 (L) 04/04/2024    HCT 41.8 04/04/2024    MCV 86 04/04/2024     04/04/2024      Lab Results   Component Value Date    GLUCOSE 94 04/04/2024    CALCIUM 9.4 04/04/2024     04/04/2024    K 4.2 04/04/2024    CO2 28 04/04/2024     04/04/2024    BUN 37 (H) 04/04/2024    CREATININE 1.96 (H) 04/04/2024      Lab Results   Component Value Date    ALT 20 04/04/2024    AST 20 04/04/2024    ALKPHOS 102 04/04/2024    BILITOT 0.4 04/04/2024      Protime  9.8 - 12.8 seconds 11.0 11.5 R 12.5 R   INR  0.9 - 1.1 1.0 1.0 1.1   aPTT  27 - 38 seconds 27 27 Low  R,         EKG 4/4/24  Sinus rhythm with 1st degree AV block  Otherwise normal   61 BPM     RCRI  1  , 6% Risk of  MACE    Cardiac  HTN - continue nifedipine   HLD - continue atorvastatin DOS  Renal  CKD - Recommendations to avoid nephrotoxic drugs and carefully monitor fluid status to maintain euvolemia. Use dose adjusted medications as needed for the underlying level of renal function.   Hematology       Patient instructed to ambulate as soon as possible postoperatively to decrease thromboembolic risk.      Initiate mechanical DVT prophylaxis as soon as possible and initiate chemical prophylaxis when deemed safe from a bleeding standpoint post surgery.       VTE prophylaxis per surgical team     Tests ordered in PAT: cbc, bmp, coag, mrsa ,EKG   LABS REVIEWED: consistent with known CKD  BUN 37   Cr 1.96  GFR  33  10/15/22  BUN 24  Cr 1.70    Risk assessment complete.  Patient is scheduled for a low surgical risk procedure.        Preoperative medication instructions were provided and reviewed with the patient.  Any additional testing or evaluation was explained to the patient.  Nothing by mouth instructions were discussed and patient's questions were answered prior to conclusion to this encounter.  Patient verbalized understanding of preoperative instructions given in preadmission testing; discharge instructions available in EMR.    This note was dictated by a speech recognition.  Minor errors may have been detected in a speech recognition.

## 2024-04-04 NOTE — H&P (VIEW-ONLY)
[facility]/PAT Evaluation       Name: Marlo Carl (Marlo Carl)  /Age: 1940/83 y.o.     In-Person       Chief Complaint: Overactive bladder / Urge incontinence of urine     HPI    Date of Consult: 24    Referring Provider:      Surgery, Date, and Length: Stage 1 Insertion Continence Control Stimulator Lead ; 24; 60 minutes     Marlo Carl  is a 83 year-old male  who presents to the [facility] for perioperative risk assessment prior to surgery.  Patient with  BPH w/ LUTS, OAB symptoms and  urge incontinence. Has failed bladder botox () and myrbetriq 25 mg for his UUI.   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  Has tried condom catheterization in the past, but it would not stay on. Has tried ISC in the past, however only produced low volumes thus discontinued.  Continues to leak, worse at night, wears precautionary pads for this.     This note was created in part upon personal review of patient's medical records.      Patient is scheduled to have Stage 1 Insertion Continence Control Stimulator Lead     Medical History  Past Medical History:   Diagnosis Date    Anemia     Arthritis     BPH with obstruction/lower urinary tract symptoms     Cerebral vascular accident (CMS/Former[facility])     ,  w/ residual left sided weakness, CT Head 22  Large area of encephalomalacia of the right MCA territory, unchanged since prior. 3. Advanced chronic microvascular ischemic changes and chronic left basal ganglia lacunar infarct.    CKD (chronic kidney disease)     Cognitive decline     Dementia (CMS/[facility]ly [facility])     on donzepril    Depression     Hearing aid worn     refuses to wear    HL (hearing loss)     Hypertension     OAB (overactive bladder)     PE (pulmonary thromboembolism) (CMS/Formerly [facility]) 2018    off anticoags    Resting tremor     mild hands    RLS (restless legs syndrome)     Spinal stenosis     Unspecified urinary incontinence         STOP BANG =  3   (male, >49 yo, htn)    Caprini =  8    (age,surgery,hx PE)    Surgical History  Past Surgical History:   Procedure Laterality Date    CARPAL TUNNEL RELEASE Bilateral     CATARACT EXTRACTION Bilateral 2006    EXCISION / REPAIR HYDROCELE PEDIATRIC Left 2022    TRANSURETHRAL RESECTION OF PROSTATE  2016             Family history:  Family History   Problem Relation Name Age of Onset    Diabetes Mother      Heart attack Mother      Cancer Father      Other (silicosis) Father      Heart attack Brother          Social history:  Social History     Socioeconomic History    Marital status:      Spouse name: Not on file    Number of children: Not on file    Years of education: Not on file    Highest education level: Not on file   Occupational History    Not on file   Tobacco Use    Smoking status: Former     Packs/day: .5     Types: Cigarettes     Quit date: 10/1/1992     Years since quittin.5    Smokeless tobacco: Never   Vaping Use    Vaping Use: Never used   Substance and Sexual Activity    Alcohol use: Yes     Alcohol/week: 1.0 standard drink of alcohol     Types: 1 Cans of beer per week     Comment: occassional beer    Drug use: Not Currently    Sexual activity: Not on file   Other Topics Concern    Not on file   Social History Narrative    Not on file     Social Determinants of Health     Financial Resource Strain: Not on file   Food Insecurity: Not on file   Transportation Needs: Not on file   Physical Activity: Not on file   Stress: Not on file   Social Connections: Not on file   Intimate Partner Violence: Not on file   Housing Stability: Not on file          Current Outpatient Medications:     aspirin 81 mg EC tablet, Take 1 tablet (81 mg) by mouth once daily., Disp: , Rfl:     atorvastatin (Lipitor) 20 mg tablet, Take 1 tablet (20 mg) by mouth once daily., Disp: , Rfl:     cranberry fruit extract (CRANBERRY CONCENTRATE ORAL), Take 30,000 mg by mouth once daily., Disp: , Rfl:     diphenhydrAMINE-acetaminophen (Tylenol PM)  mg per  "tablet, Take 1 tablet by mouth as needed at bedtime for sleep., Disp: , Rfl:     docusate sodium (Colace) 100 mg capsule, Take 1 capsule (100 mg) by mouth once daily., Disp: , Rfl:     donepezil (Aricept) 10 mg tablet, Take 1 tablet (10 mg) by mouth once daily at bedtime., Disp: 30 tablet, Rfl: 2    krill oil 500 mg capsule, Take by mouth., Disp: , Rfl:     melatonin 5 mg capsule, Take 1 capsule (5 mg) by mouth as needed at bedtime., Disp: , Rfl:     mirabegron (Myrbetriq) 50 mg tablet extended release 24 hr 24 hr tablet, Take 1 tablet (50 mg) by mouth once daily., Disp: 30 tablet, Rfl: 11    multivit-minerals/folic acid (CENTRUM ADULTS ORAL), Take 1 tablet by mouth once daily., Disp: , Rfl:     NIFEdipine CC 90 mg 24 hr tablet, Take 1 tablet (90 mg) by mouth once daily., Disp: 90 tablet, Rfl: 2    rOPINIRole (Requip) 0.5 mg tablet, Take 1 tablet (0.5 mg) by mouth once daily at bedtime., Disp: 30 tablet, Rfl: 1    chlorhexidine (Peridex) 0.12 % solution, 15 ml swish and spit for 30 seconds night prior to surgery and morning of surgery, Disp: 473 mL, Rfl: 0    cyclobenzaprine (Flexeril) 5 mg tablet, Take 1 tablet (5 mg) by mouth 3 times a day as needed for muscle spasms. (Patient not taking: Reported on 3/27/2024), Disp: 30 tablet, Rfl: 2     Visit Vitals  /88   Pulse 62   Temp 35.2 °C (95.4 °F)   Resp 18   Ht 1.715 m (5' 7.5\")   Wt 74.5 kg (164 lb 3.9 oz)   SpO2 100%   BMI 25.34 kg/m²   Smoking Status Former   BSA 1.88 m²         Review of Systems   Constitutional: Negative.         Ambulates with rolling walker    HENT: Negative.     Eyes: Negative.    Respiratory: Negative.     Cardiovascular: Negative.         METS 3      +stairs / walk around in the house without SOB/chest pain   Gastrointestinal: Negative.    Endocrine: Negative.    Genitourinary:  Positive for frequency and urgency.   Musculoskeletal: Negative.    Skin: Negative.    Allergic/Immunologic: Negative.    Neurological: Negative.  "   Hematological: Negative.    Psychiatric/Behavioral: Negative.          Physical Exam  Constitutional:       Appearance: Normal appearance.   HENT:      Head: Normocephalic and atraumatic.      Right Ear: External ear normal.      Left Ear: External ear normal.      Nose: Nose normal.      Mouth/Throat:      Pharynx: Oropharynx is clear.   Eyes:      Conjunctiva/sclera: Conjunctivae normal.   Neck:      Comments: Decreased ROM  Cardiovascular:      Rate and Rhythm: Normal rate and regular rhythm.      Heart sounds: Normal heart sounds.   Pulmonary:      Effort: Pulmonary effort is normal.      Breath sounds: Normal breath sounds.   Abdominal:      General: Abdomen is flat.      Palpations: Abdomen is soft.   Musculoskeletal:         General: Normal range of motion.   Skin:     General: Skin is warm and dry.   Neurological:      General: No focal deficit present.      Mental Status: He is alert and oriented to person, place, and time.   Psychiatric:         Mood and Affect: Mood normal.         Behavior: Behavior normal.         Thought Content: Thought content normal.         Judgment: Judgment normal.          PAT AIRWAY:   Airway:     Mallampati::  I    Neck ROM::  Limited    Lab Results   Component Value Date    WBC 4.1 (L) 04/04/2024    HGB 13.1 (L) 04/04/2024    HCT 41.8 04/04/2024    MCV 86 04/04/2024     04/04/2024      Lab Results   Component Value Date    GLUCOSE 94 04/04/2024    CALCIUM 9.4 04/04/2024     04/04/2024    K 4.2 04/04/2024    CO2 28 04/04/2024     04/04/2024    BUN 37 (H) 04/04/2024    CREATININE 1.96 (H) 04/04/2024      Lab Results   Component Value Date    ALT 20 04/04/2024    AST 20 04/04/2024    ALKPHOS 102 04/04/2024    BILITOT 0.4 04/04/2024      Protime  9.8 - 12.8 seconds 11.0 11.5 R 12.5 R   INR  0.9 - 1.1 1.0 1.0 1.1   aPTT  27 - 38 seconds 27 27 Low  R,         EKG 4/4/24  Sinus rhythm with 1st degree AV block  Otherwise normal   61 BPM     RCRI  1  , 6% Risk of  MACE    Cardiac  HTN - continue nifedipine   HLD - continue atorvastatin DOS  Renal  CKD - Recommendations to avoid nephrotoxic drugs and carefully monitor fluid status to maintain euvolemia. Use dose adjusted medications as needed for the underlying level of renal function.   Hematology       Patient instructed to ambulate as soon as possible postoperatively to decrease thromboembolic risk.      Initiate mechanical DVT prophylaxis as soon as possible and initiate chemical prophylaxis when deemed safe from a bleeding standpoint post surgery.       VTE prophylaxis per surgical team     Tests ordered in PAT: cbc, bmp, coag, mrsa ,EKG   LABS REVIEWED: consistent with known CKD  BUN 37   Cr 1.96  GFR  33  10/15/22  BUN 24  Cr 1.70    Risk assessment complete.  Patient is scheduled for a low surgical risk procedure.        Preoperative medication instructions were provided and reviewed with the patient.  Any additional testing or evaluation was explained to the patient.  Nothing by mouth instructions were discussed and patient's questions were answered prior to conclusion to this encounter.  Patient verbalized understanding of preoperative instructions given in preadmission testing; discharge instructions available in EMR.    This note was dictated by a speech recognition.  Minor errors may have been detected in a speech recognition.

## 2024-04-05 DIAGNOSIS — N39.46 MIXED STRESS AND URGE URINARY INCONTINENCE: Primary | ICD-10-CM

## 2024-04-05 DIAGNOSIS — R77.9 ELEVATED BLOOD PROTEIN: Primary | ICD-10-CM

## 2024-04-05 LAB
ATRIAL RATE: 61 BPM
P AXIS: 47 DEGREES
P OFFSET: 168 MS
P ONSET: 115 MS
PR INTERVAL: 222 MS
Q ONSET: 226 MS
QRS COUNT: 10 BEATS
QRS DURATION: 84 MS
QT INTERVAL: 464 MS
QTC CALCULATION(BAZETT): 467 MS
QTC FREDERICIA: 466 MS
R AXIS: -5 DEGREES
T AXIS: 45 DEGREES
T OFFSET: 458 MS
VENTRICULAR RATE: 61 BPM

## 2024-04-06 LAB — STAPHYLOCOCCUS SPEC CULT: NORMAL

## 2024-04-17 ENCOUNTER — APPOINTMENT (OUTPATIENT)
Dept: RADIOLOGY | Facility: HOSPITAL | Age: 84
End: 2024-04-17
Payer: MEDICARE

## 2024-04-17 ENCOUNTER — ANESTHESIA EVENT (OUTPATIENT)
Dept: OPERATING ROOM | Facility: HOSPITAL | Age: 84
End: 2024-04-17
Payer: MEDICARE

## 2024-04-17 ENCOUNTER — ANESTHESIA (OUTPATIENT)
Dept: OPERATING ROOM | Facility: HOSPITAL | Age: 84
End: 2024-04-17
Payer: MEDICARE

## 2024-04-17 ENCOUNTER — HOSPITAL ENCOUNTER (OUTPATIENT)
Facility: HOSPITAL | Age: 84
Setting detail: OUTPATIENT SURGERY
Discharge: HOME | End: 2024-04-17
Attending: UROLOGY | Admitting: UROLOGY
Payer: MEDICARE

## 2024-04-17 VITALS
BODY MASS INDEX: 25.69 KG/M2 | OXYGEN SATURATION: 99 % | DIASTOLIC BLOOD PRESSURE: 80 MMHG | HEART RATE: 69 BPM | TEMPERATURE: 97.5 F | RESPIRATION RATE: 16 BRPM | WEIGHT: 169.53 LBS | SYSTOLIC BLOOD PRESSURE: 166 MMHG | HEIGHT: 68 IN

## 2024-04-17 DIAGNOSIS — N32.81 OVERACTIVE BLADDER: ICD-10-CM

## 2024-04-17 DIAGNOSIS — R32 URINARY INCONTINENCE: Primary | ICD-10-CM

## 2024-04-17 DIAGNOSIS — N39.41 URGE INCONTINENCE OF URINE: ICD-10-CM

## 2024-04-17 PROBLEM — N18.9 CHRONIC RENAL INSUFFICIENCY: Status: ACTIVE | Noted: 2024-04-17

## 2024-04-17 PROBLEM — D64.9 ANEMIA: Status: ACTIVE | Noted: 2024-04-17

## 2024-04-17 PROCEDURE — 2500000004 HC RX 250 GENERAL PHARMACY W/ HCPCS (ALT 636 FOR OP/ED): Performed by: ANESTHESIOLOGY

## 2024-04-17 PROCEDURE — 3600000009 HC OR TIME - EACH INCREMENTAL 1 MINUTE - PROCEDURE LEVEL FOUR: Performed by: UROLOGY

## 2024-04-17 PROCEDURE — A4217 STERILE WATER/SALINE, 500 ML: HCPCS | Performed by: UROLOGY

## 2024-04-17 PROCEDURE — 64561 IMPLANT NEUROELECTRODES: CPT | Performed by: UROLOGY

## 2024-04-17 PROCEDURE — A64561 PR PRQ IMPLTJ NEUROSTIM ELTRD SACRAL NRVE W/IMAGING: Performed by: NURSE ANESTHETIST, CERTIFIED REGISTERED

## 2024-04-17 PROCEDURE — 2500000004 HC RX 250 GENERAL PHARMACY W/ HCPCS (ALT 636 FOR OP/ED): Performed by: UROLOGY

## 2024-04-17 PROCEDURE — 99100 ANES PT EXTEME AGE<1 YR&>70: CPT | Performed by: ANESTHESIOLOGY

## 2024-04-17 PROCEDURE — 7100000010 HC PHASE TWO TIME - EACH INCREMENTAL 1 MINUTE: Performed by: UROLOGY

## 2024-04-17 PROCEDURE — 3700000001 HC GENERAL ANESTHESIA TIME - INITIAL BASE CHARGE: Performed by: UROLOGY

## 2024-04-17 PROCEDURE — 2500000001 HC RX 250 WO HCPCS SELF ADMINISTERED DRUGS (ALT 637 FOR MEDICARE OP): Performed by: NURSE ANESTHETIST, CERTIFIED REGISTERED

## 2024-04-17 PROCEDURE — 3700000002 HC GENERAL ANESTHESIA TIME - EACH INCREMENTAL 1 MINUTE: Performed by: UROLOGY

## 2024-04-17 PROCEDURE — A64561 PR PRQ IMPLTJ NEUROSTIM ELTRD SACRAL NRVE W/IMAGING: Performed by: ANESTHESIOLOGY

## 2024-04-17 PROCEDURE — C1778 LEAD, NEUROSTIMULATOR: HCPCS | Performed by: UROLOGY

## 2024-04-17 PROCEDURE — 7100000002 HC RECOVERY ROOM TIME - EACH INCREMENTAL 1 MINUTE: Performed by: UROLOGY

## 2024-04-17 PROCEDURE — C1767 GENERATOR, NEURO NON-RECHARG: HCPCS | Performed by: UROLOGY

## 2024-04-17 PROCEDURE — 2500000005 HC RX 250 GENERAL PHARMACY W/O HCPCS: Performed by: NURSE ANESTHETIST, CERTIFIED REGISTERED

## 2024-04-17 PROCEDURE — 7100000001 HC RECOVERY ROOM TIME - INITIAL BASE CHARGE: Performed by: UROLOGY

## 2024-04-17 PROCEDURE — 7100000009 HC PHASE TWO TIME - INITIAL BASE CHARGE: Performed by: UROLOGY

## 2024-04-17 PROCEDURE — L8679 IMP NEUROSTI PLS GN ANY TYPE: HCPCS | Performed by: UROLOGY

## 2024-04-17 PROCEDURE — 3600000004 HC OR TIME - INITIAL BASE CHARGE - PROCEDURE LEVEL FOUR: Performed by: UROLOGY

## 2024-04-17 PROCEDURE — 2780000003 HC OR 278 NO HCPCS: Performed by: UROLOGY

## 2024-04-17 PROCEDURE — C1889 IMPLANT/INSERT DEVICE, NOC: HCPCS | Performed by: UROLOGY

## 2024-04-17 PROCEDURE — 76000 FLUOROSCOPY <1 HR PHYS/QHP: CPT | Mod: 59

## 2024-04-17 PROCEDURE — 2720000007 HC OR 272 NO HCPCS: Performed by: UROLOGY

## 2024-04-17 PROCEDURE — 2500000005 HC RX 250 GENERAL PHARMACY W/O HCPCS: Performed by: UROLOGY

## 2024-04-17 PROCEDURE — 2500000004 HC RX 250 GENERAL PHARMACY W/ HCPCS (ALT 636 FOR OP/ED): Performed by: NURSE ANESTHETIST, CERTIFIED REGISTERED

## 2024-04-17 DEVICE — KIT, LEAD IMPLANT: Type: IMPLANTABLE DEVICE | Site: BACK | Status: NON-FUNCTIONAL

## 2024-04-17 DEVICE — KIT, TINED LEAD: Type: IMPLANTABLE DEVICE | Site: BACK | Status: FUNCTIONAL

## 2024-04-17 RX ORDER — ONDANSETRON HYDROCHLORIDE 2 MG/ML
4 INJECTION, SOLUTION INTRAVENOUS ONCE AS NEEDED
Status: DISCONTINUED | OUTPATIENT
Start: 2024-04-17 | End: 2024-04-17 | Stop reason: HOSPADM

## 2024-04-17 RX ORDER — PROPOFOL 10 MG/ML
INJECTION, EMULSION INTRAVENOUS AS NEEDED
Status: DISCONTINUED | OUTPATIENT
Start: 2024-04-17 | End: 2024-04-17

## 2024-04-17 RX ORDER — SODIUM CHLORIDE 9 MG/ML
75 INJECTION, SOLUTION INTRAVENOUS CONTINUOUS
Status: DISCONTINUED | OUTPATIENT
Start: 2024-04-17 | End: 2024-04-17 | Stop reason: HOSPADM

## 2024-04-17 RX ORDER — SODIUM CHLORIDE, SODIUM LACTATE, POTASSIUM CHLORIDE, CALCIUM CHLORIDE 600; 310; 30; 20 MG/100ML; MG/100ML; MG/100ML; MG/100ML
100 INJECTION, SOLUTION INTRAVENOUS CONTINUOUS
Status: DISCONTINUED | OUTPATIENT
Start: 2024-04-17 | End: 2024-04-17 | Stop reason: SDUPTHER

## 2024-04-17 RX ORDER — SODIUM CHLORIDE 0.9 G/100ML
IRRIGANT IRRIGATION AS NEEDED
Status: DISCONTINUED | OUTPATIENT
Start: 2024-04-17 | End: 2024-04-17 | Stop reason: HOSPADM

## 2024-04-17 RX ORDER — LIDOCAINE HYDROCHLORIDE 40 MG/ML
SOLUTION TOPICAL AS NEEDED
Status: DISCONTINUED | OUTPATIENT
Start: 2024-04-17 | End: 2024-04-17

## 2024-04-17 RX ORDER — SODIUM CHLORIDE, SODIUM LACTATE, POTASSIUM CHLORIDE, CALCIUM CHLORIDE 600; 310; 30; 20 MG/100ML; MG/100ML; MG/100ML; MG/100ML
100 INJECTION, SOLUTION INTRAVENOUS CONTINUOUS
Status: DISCONTINUED | OUTPATIENT
Start: 2024-04-17 | End: 2024-04-17 | Stop reason: HOSPADM

## 2024-04-17 RX ORDER — OXYCODONE HYDROCHLORIDE 5 MG/1
5 TABLET ORAL EVERY 4 HOURS PRN
Status: DISCONTINUED | OUTPATIENT
Start: 2024-04-17 | End: 2024-04-17 | Stop reason: HOSPADM

## 2024-04-17 RX ORDER — SUCCINYLCHOLINE CHLORIDE 20 MG/ML
INJECTION INTRAMUSCULAR; INTRAVENOUS AS NEEDED
Status: DISCONTINUED | OUTPATIENT
Start: 2024-04-17 | End: 2024-04-17

## 2024-04-17 RX ORDER — CEFAZOLIN SODIUM 2 G/100ML
2 INJECTION, SOLUTION INTRAVENOUS ONCE
Status: DISCONTINUED | OUTPATIENT
Start: 2024-04-17 | End: 2024-04-17 | Stop reason: HOSPADM

## 2024-04-17 RX ORDER — ONDANSETRON HYDROCHLORIDE 2 MG/ML
INJECTION, SOLUTION INTRAVENOUS AS NEEDED
Status: DISCONTINUED | OUTPATIENT
Start: 2024-04-17 | End: 2024-04-17

## 2024-04-17 RX ORDER — CEFAZOLIN 1 G/1
INJECTION, POWDER, FOR SOLUTION INTRAVENOUS AS NEEDED
Status: DISCONTINUED | OUTPATIENT
Start: 2024-04-17 | End: 2024-04-17

## 2024-04-17 RX ORDER — LIDOCAINE HYDROCHLORIDE 20 MG/ML
INJECTION, SOLUTION INFILTRATION; PERINEURAL AS NEEDED
Status: DISCONTINUED | OUTPATIENT
Start: 2024-04-17 | End: 2024-04-17

## 2024-04-17 RX ORDER — LIDOCAINE HYDROCHLORIDE 10 MG/ML
0.1 INJECTION, SOLUTION EPIDURAL; INFILTRATION; INTRACAUDAL; PERINEURAL ONCE
Status: DISCONTINUED | OUTPATIENT
Start: 2024-04-17 | End: 2024-04-17 | Stop reason: HOSPADM

## 2024-04-17 RX ORDER — CHLORHEXIDINE GLUCONATE 40 MG/ML
SOLUTION TOPICAL DAILY PRN
Status: DISCONTINUED | OUTPATIENT
Start: 2024-04-17 | End: 2024-04-17 | Stop reason: HOSPADM

## 2024-04-17 RX ORDER — MEPERIDINE HYDROCHLORIDE 25 MG/ML
12.5 INJECTION INTRAMUSCULAR; INTRAVENOUS; SUBCUTANEOUS EVERY 10 MIN PRN
Status: DISCONTINUED | OUTPATIENT
Start: 2024-04-17 | End: 2024-04-17 | Stop reason: HOSPADM

## 2024-04-17 RX ORDER — LIDOCAINE HYDROCHLORIDE 10 MG/ML
INJECTION, SOLUTION EPIDURAL; INFILTRATION; INTRACAUDAL; PERINEURAL AS NEEDED
Status: DISCONTINUED | OUTPATIENT
Start: 2024-04-17 | End: 2024-04-17 | Stop reason: HOSPADM

## 2024-04-17 RX ADMIN — LIDOCAINE HYDROCHLORIDE 4 ML: 40 SOLUTION TOPICAL at 09:09

## 2024-04-17 RX ADMIN — SODIUM CHLORIDE 75 ML/HR: 9 INJECTION, SOLUTION INTRAVENOUS at 08:34

## 2024-04-17 RX ADMIN — SODIUM CHLORIDE, POTASSIUM CHLORIDE, SODIUM LACTATE AND CALCIUM CHLORIDE: 600; 310; 30; 20 INJECTION, SOLUTION INTRAVENOUS at 09:08

## 2024-04-17 RX ADMIN — PROPOFOL 70 MG: 10 INJECTION, EMULSION INTRAVENOUS at 09:08

## 2024-04-17 RX ADMIN — ONDANSETRON 4 MG: 2 INJECTION INTRAMUSCULAR; INTRAVENOUS at 09:43

## 2024-04-17 RX ADMIN — CEFAZOLIN 2 G: 330 INJECTION, POWDER, FOR SOLUTION INTRAMUSCULAR; INTRAVENOUS at 09:15

## 2024-04-17 RX ADMIN — LIDOCAINE HYDROCHLORIDE 50 MG: 20 INJECTION, SOLUTION INFILTRATION; PERINEURAL at 09:08

## 2024-04-17 RX ADMIN — SUCCINYLCHOLINE CHLORIDE 120 MG: 20 INJECTION, SOLUTION INTRAMUSCULAR; INTRAVENOUS at 09:08

## 2024-04-17 ASSESSMENT — COLUMBIA-SUICIDE SEVERITY RATING SCALE - C-SSRS
2. HAVE YOU ACTUALLY HAD ANY THOUGHTS OF KILLING YOURSELF?: NO
1. IN THE PAST MONTH, HAVE YOU WISHED YOU WERE DEAD OR WISHED YOU COULD GO TO SLEEP AND NOT WAKE UP?: NO
6. HAVE YOU EVER DONE ANYTHING, STARTED TO DO ANYTHING, OR PREPARED TO DO ANYTHING TO END YOUR LIFE?: NO

## 2024-04-17 ASSESSMENT — PAIN SCALES - GENERAL
PAINLEVEL_OUTOF10: 0 - NO PAIN
PAIN_LEVEL: 0
PAINLEVEL_OUTOF10: 0 - NO PAIN
PAINLEVEL_OUTOF10: 0 - NO PAIN

## 2024-04-17 ASSESSMENT — PAIN - FUNCTIONAL ASSESSMENT
PAIN_FUNCTIONAL_ASSESSMENT: 0-10

## 2024-04-17 NOTE — ANESTHESIA PROCEDURE NOTES
Airway  Date/Time: 4/17/2024 9:09 AM  Urgency: elective    Airway not difficult    Staffing  Performed: CRNA   Authorized by: Artie Reddy MD    Performed by: MERCY Sinha-ASHER  Patient location during procedure: OR    Indications and Patient Condition  Indications for airway management: anesthesia  Spontaneous Ventilation: absent  Sedation level: deep  Preoxygenated: yes  Patient position: sniffing  Mask difficulty assessment: 0 - not attempted    Final Airway Details  Final airway type: endotracheal airway      Successful airway: ETT  Cuffed: yes   Successful intubation technique: direct laryngoscopy  Blade: Tung  Blade size: #4  ETT size (mm): 7.5  Cormack-Lehane Classification: grade IIa - partial view of glottis  Placement verified by: chest auscultation and capnometry   Measured from: lips  ETT to lips (cm): 23  Number of attempts at approach: 1

## 2024-04-17 NOTE — ANESTHESIA POSTPROCEDURE EVALUATION
Patient: Marlo Carl    Procedure Summary       Date: 04/17/24 Room / Location: [facility] OR 01 / Virtual [facility] OR    Anesthesia Start: 0901 Anesthesia Stop: 0957    Procedure: Stage 1 Insertion Continence Control Stimulator Lead (Right: Back) Diagnosis:       Overactive bladder      Urge incontinence of urine      (Overactive bladder [N32.81])      (Urge incontinence of urine [N39.41])    Surgeons: Vishal Delgado MD Responsible Provider: Artie Reddy MD    Anesthesia Type: general ASA Status: 3            Anesthesia Type: general    Vitals Value Taken Time   /76 04/17/24 1001   Temp 36.4 °C (97.5 °F) 04/17/24 0955   Pulse 70 04/17/24 1014   Resp 16 04/17/24 1000   SpO2 99 % 04/17/24 1012   Vitals shown include unfiled device data.    Anesthesia Post Evaluation    Patient location during evaluation: bedside  Patient participation: complete - patient cannot participate  Level of consciousness: awake  Pain score: 0  Pain management: adequate  Airway patency: patent  Cardiovascular status: acceptable  Respiratory status: acceptable  Hydration status: acceptable  Postoperative Nausea and Vomiting: none        No notable events documented.

## 2024-04-17 NOTE — DISCHARGE INSTRUCTIONS
Call Dr. Delgado for any problems and/or concerns    *No lifting/straining greater than 10 pounds  *No showering, can sponge bath  *Prevent constipation by using stool softeners and staying hydrated, so that you do not strain against your stitches or have pain from constipation    Call Doctor right away for:    *Fever above 100.4/shaking chills  *A foul smelling discharge f  *Persistent nausea/vomiting  *Redness, swelling, or drainage at your incision sites  *Chest pain/shortness of breath-call 911.    - Can take over the counter pain medication as needed., alternate a dose of Acetaminophen (Tylenol) and Ibuprofen (Motrin) every 3 hours.

## 2024-04-17 NOTE — OP NOTE
Stage 1 Insertion Continence Control Stimulator Lead (R) Operative Note     Date: 2024  OR Location: [facility] OR    Name: Marlo Carl, : 1940, Age: 83 y.o., MRN: [id number], Sex: male    Diagnosis  Pre-op Diagnosis     * Overactive bladder [N32.81]     * Urge incontinence of urine [N39.41] Post-op Diagnosis     * Overactive bladder [N32.81]     * Urge incontinence of urine [N39.41]     Procedures  Stage 1 Insertion Continence Control Stimulator Lead  96903 - ME PRQ IMPLTJ NEUROSTIM ELTRD SACRAL NRVE W/IMAGING      Surgeons      * Vishal Delgado - Primary    Resident/Fellow/Other Assistant:  Surgeons and Role:     * Nan Munson MD - Resident - Assisting    Procedure Summary  Anesthesia: Monitor Anesthesia Care  ASA: III  Anesthesia Staff: Anesthesiologist: Artie Reddy MD  CRNA: MERCY Sinha-CRNA  Estimated Blood Loss: 3mL  Intra-op Medications:   Administrations occurring from 0930 to 1030 on 24:   Medication Name Total Dose   lidocaine PF (Xylocaine) 10 mg/mL (1 %) injection 10 mL   sodium chloride 0.9 % irrigation solution 1,000 mL   lactated Ringer's infusion Cannot be calculated              Anesthesia Record               Intraprocedure I/O Totals          Intake    lactated Ringer's infusion 291.67 mL    Total Intake 291.67 mL          Specimen: No specimens collected     Staff:   Circulator: Adriane Krishnamurthy RN  Relief Circulator: Sydnee Mak RN  Scrub Person: Laurie Resendez    Drains and/or Catheters: * None in log *    Tourniquet Times: NA        Implants:  Implants       Type Name Action Serial No.      Neuro Interventional Implant KIT, TINED LEAD - [id number] - [id number] Implanted [id number]     Neuro Interventional Implant KIT, LEAD IMPLANT - [id number] Used, Not Implanted      General Urology [employer] PERCUTANEOUS EXTENSIONS Implanted [id number]              Findings: excellent motor responses    Indications: Marlo Carl is an 83 y.o. male who is having surgery for  Overactive bladder [N32.81]  Urge incontinence of urine [N39.41].     The patient was seen in the preoperative area. The risks, benefits, complications, treatment options, non-operative alternatives, expected recovery and outcomes were discussed with the patient. The possibilities of reaction to medication, pulmonary aspiration, injury to surrounding structures, bleeding, recurrent infection, the need for additional procedures, failure to diagnose a condition, and creating a complication requiring transfusion or operation were discussed with the patient. The patient concurred with the proposed plan, giving informed consent.  The site of surgery was properly noted/marked if necessary per policy. The patient has been actively warmed in preoperative area. Preoperative antibiotics have been ordered and given within 1 hours of incision. Venous thrombosis prophylaxis have been ordered including bilateral sequential compression devices    Procedure Details: Procedure(s):  PLACEMENT OF LEAD WIRE (INS) right  CPT Code(s): 49749    Findings:  Placement of lead wire in the right S3 foramen confirmed with fluoroscopy and appropriate placement of the temporary neurostimulator (INS) in the right upper buttock in the     Details of Procedure Performed:  After surgical consent was reviewed with the patient, including risks, benefits and alternatives, the patient expressed desire to proceed with surgery. The patient was then taken to the operating room and placed in the prone position. Pillows were placed in the lower abdomen and hips to level and flatten the sacrum and allow the toes to dangle freely.  Intravenous Ancef was infused 30 minutes prior to incision for infection prophylaxis. A ground pad was placed on the bottom of the patient's foot and was connected to the Clinician  along with the test stimulation cable (J-hook). Tape was used on the buttocks to expose the anus and allow for observation of the eulalia  response. The patient was then prepped and draped in the usual fashion.   General anesthesia was induced. Preoperative fluoroscopy was performed to visualize the sacral anatomy.    Attention was turned to placement of the lead wire using the [employer] System. The level of S3 and the medial border of the sacral foramen were identified bilaterally using fluoroscopy in the AP view. After administration of local anesthesia, a foramen needle was placed in the superior, medial aspect of the S3 foramen such that the needle was parallel to medial border of the foramen. A lateral fluoroscopic image was then obtained to ensure the needle entry point was approximately 1 cm above and parallel to the fusion plate of S3. The needle was advanced such that the tip of the foramen needle was just at the anterior aspect of the sacrum.  The J-hook was then placed on the uninsulated portion of the foramen needle and stimulation applied to obtain the opening threshold amplitude. There was noted to be a positive eulalia response at 1mA. Once the ideal location was confirmed, a small incision was made at the foramen needle to accommodate the lead wire and tunneling tool. The directional guide was placed through the foramen needle and the needle removed. The introducer was placed over the directional guide and advanced under live fluoroscopy such that the radiopaque marker was placed half-way through the sacral plate. The dilator was removed along with the directional guide. Using live fluoroscopy, the tined lead with the curved stylet was placed through the introducer until electrode three straddled the anterior surface of the sacrum and ensuring the lead had a gentle downward trajectory. The stimulation clip was then placed on the distal lead and each electrode was tested observing for a motor response. After satisfactory lead positioning was confirmed, the introducer was retracted over the lead under continuous fluoroscopy, deploying the  tines into presacral tissue. Stimulation thresholds were established using the least amount of stimulation required to obtain a motor response.     A tunneling tool with an overlying plastic sheath was inserted from the lead insertion site subcutaneously to the new INS pocket site. After administration of local anesthetic, a 3cm incision was made over the site of INS placement site, to a depth no greater than 2cm deep to the skin. A pocket was created to accommodate the percutaneous extension tubing and connection. The tunneling tool was passed from the lead wire insertion site to the lateral pocket. The sharp tip of the tunneling tool was removed and the lead was fed through the sheath, exiting at the pocket site. The sheath was removed. The lead was cleaned and dried and connected to the percutaneous extension tubing by inserting the lead into the header of the neurostimulator until the white tip was visualized in the strain relief. The single set screw was tightened using the torque wrench    The extension tubing was then brought out across on the left side of his back using the tunneling tool.     Impedances were confirmed to be within normal limits.    The incision closed with 2-0 Vicryl for the subcutaneous layer, skin was closed with running 4-0 Monocryl skin sutures. Skin glue was applied and covered with transparent dressing.    Instrument, sponge, and needle counts were correct times two.    Vishal Delgado MD    Complications:  None; patient tolerated the procedure well.    Disposition: PACU - hemodynamically stable.  Condition: stable     Additional Details: Plan to be discharged home.  Plan for stage 2 vs removal of lead next week    Attending Attestation: I was present and scrubbed for the entire procedure.    Vishal Delgado  Phone Number: [phone]

## 2024-04-17 NOTE — ANESTHESIA PREPROCEDURE EVALUATION
Patient: Marlo Carl    Procedure Information       Date/Time: 04/17/24 0930    Procedure: Stage 1 Insertion Continence Control Stimulator Lead    Location: [facility] OR 01 / Virtual [facility] OR    Surgeons: Vishal Delgado MD            Relevant Problems   Anesthesia (within normal limits)      Cardiac   (+) Acute septic pulmonary embolism with acute cor pulmonale (Multi)   (+) Hyperlipidemia   (+) Hypertension      Pulmonary   (+) Acute septic pulmonary embolism with acute cor pulmonale (Multi)   (+) Pulmonary embolism (Multi)      Neuro  Restless leg syndrome  Resting tremor  Amb w walker  Sl shuffling gait     (+) CVA (cerebral vascular accident) (Multi)   (+) Vascular dementia (Multi)      /Renal  Overactive bladder   (+) BPH (benign prostatic hyperplasia)   (+) Bacterial UTI   (+) Chronic renal insufficiency   (+) Enlarged prostate with lower urinary tract symptoms (LUTS)      Hematology   (+) Anemia      Musculoskeletal   (+) Gouty arthritis   (+) Lumbar spinal stenosis   (+) Osteoarthritis of cervical spine with myelopathy   (+) Primary osteoarthritis, right hand      HEENT   (+) Bilateral sensorineural hearing loss   (+) Hearing loss      ID   (+) Bacterial UTI   (+) Viral upper respiratory tract infection       Clinical information reviewed:                   NPO Detail:  NPO/Void Status  Date of Last Liquid: 04/17/24  Time of Last Liquid: 0000  Date of Last Solid: 04/16/24  Time of Last Solid: 2100         PHYSICAL EXAM    Anesthesia Plan    History of general anesthesia?: yes  History of complications of general anesthesia?: no    ASA 3     general     intravenous induction   Anesthetic plan and risks discussed with patient.

## 2024-04-17 NOTE — SIGNIFICANT EVENT
Discharge instructions discussed with patient and family at this time verbalized understanding; awaiting device rep for discharge

## 2024-04-18 ENCOUNTER — TELEPHONE (OUTPATIENT)
Dept: PREOP | Facility: HOSPITAL | Age: 84
End: 2024-04-18

## 2024-04-18 ENCOUNTER — PREP FOR PROCEDURE (OUTPATIENT)
Dept: UROLOGY | Facility: HOSPITAL | Age: 84
End: 2024-04-18
Payer: MEDICARE

## 2024-04-18 DIAGNOSIS — N32.81 OAB (OVERACTIVE BLADDER): Primary | ICD-10-CM

## 2024-04-18 RX ORDER — CEFAZOLIN SODIUM 2 G/100ML
2 INJECTION, SOLUTION INTRAVENOUS ONCE
Status: CANCELLED | OUTPATIENT
Start: 2024-04-18 | End: 2024-04-18

## 2024-04-18 RX ORDER — SODIUM CHLORIDE, SODIUM LACTATE, POTASSIUM CHLORIDE, CALCIUM CHLORIDE 600; 310; 30; 20 MG/100ML; MG/100ML; MG/100ML; MG/100ML
100 INJECTION, SOLUTION INTRAVENOUS CONTINUOUS
Status: CANCELLED | OUTPATIENT
Start: 2024-04-18

## 2024-04-19 ENCOUNTER — TELEMEDICINE (OUTPATIENT)
Dept: UROLOGY | Facility: HOSPITAL | Age: 84
End: 2024-04-19
Payer: MEDICARE

## 2024-04-19 DIAGNOSIS — R35.0 URINARY FREQUENCY: Primary | ICD-10-CM

## 2024-04-19 DIAGNOSIS — N39.46 MIXED STRESS AND URGE URINARY INCONTINENCE: ICD-10-CM

## 2024-04-19 DIAGNOSIS — N32.81 OAB (OVERACTIVE BLADDER): ICD-10-CM

## 2024-04-19 PROCEDURE — 1159F MED LIST DOCD IN RCRD: CPT | Performed by: UROLOGY

## 2024-04-19 PROCEDURE — 99024 POSTOP FOLLOW-UP VISIT: CPT | Performed by: UROLOGY

## 2024-04-19 PROCEDURE — 1157F ADVNC CARE PLAN IN RCRD: CPT | Performed by: UROLOGY

## 2024-04-19 PROCEDURE — 1160F RVW MEDS BY RX/DR IN RCRD: CPT | Performed by: UROLOGY

## 2024-04-19 NOTE — PROGRESS NOTES
POV    Virtual or Telephone Consent    An interactive audio and video telecommunication system which permits real time communications between the patient (at the originating site) and provider (at the distant site) was utilized to provide this telehealth service.   Verbal consent was requested and obtained from Marlo Carl on this date, 04/19/24 for a telehealth visit.       HISTORY OF PRESENT ILLNESS:   Marlo Carl is a 83 y.o. male who is being seen today for fuv    H/o BPH w/ LUTS, OAB symptoms and UUI.  Has failed bladder botox (2019) and myrbetriq 25 mg for his UUI.   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   Has tried condom catheterization in the past, but it would not stay on.  Has tried ISC in the past, however only produced low volumes thus discontinued.  Continues to leak, worse at night, wears precautionary pads for this.      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  PSH botox, hyrocelectomy  FH of cancer, unspecified  Former smoker (quit in [id number])    AUA SS 26, nocturia 5x, QOL 5  Not sexually active    Had Axonic PNE in office on 3/8/24.  Had one good day then leads accidentally got dislodged.  Removed in office on 3/11/24. They do think that it helped.  Was wetting less at night.      Had advanced trial on 4/17/24. Lead placed on right with excellent motor responses.      4/19/24 - Has been doing well.  Staying dry overnight.  Voiding on his own.  Ok urinary stream.  >50% improvement    PAST MEDICAL HISTORY:  Past Medical History:   Diagnosis Date    Anemia     Arthritis     BPH with obstruction/lower urinary tract symptoms     Cerebral vascular accident (Multi)     1996, 2016 w/ residual left sided weakness, CT Head 9/8/22  Large area of encephalomalacia of the right MCA territory, unchanged since prior. 3. Advanced chronic microvascular ischemic changes and chronic left basal ganglia lacunar infarct.    CKD (chronic kidney disease)      Cognitive decline     Dementia (Multi)     on donzepril    Depression     Hearing aid worn     refuses to wear    HL (hearing loss)     Hypertension     OAB (overactive bladder)     PE (pulmonary thromboembolism) (Multi) 09/07/2018    off anticoags    Resting tremor     mild hands    RLS (restless legs syndrome)     Spinal stenosis     Unspecified urinary incontinence        PAST SURGICAL HISTORY:  Past Surgical History:   Procedure Laterality Date    CARPAL TUNNEL RELEASE Bilateral     CATARACT EXTRACTION Bilateral 2006    EXCISION / REPAIR HYDROCELE PEDIATRIC Left 12/2022    TRANSURETHRAL RESECTION OF PROSTATE  2016        ALLERGIES:   No Known Allergies     MEDICATIONS:   Current Outpatient Medications   Medication Instructions    aspirin 81 mg EC tablet 1 tablet, oral, Daily    atorvastatin (Lipitor) 20 mg tablet 1 tablet, oral, Daily    chlorhexidine (Peridex) 0.12 % solution 15 ml swish and spit for 30 seconds night prior to surgery and morning of surgery    cranberry fruit extract (CRANBERRY CONCENTRATE ORAL) 30,000 mg, oral, Daily    cyclobenzaprine (FLEXERIL) 5 mg, oral, 3 times daily PRN    diphenhydrAMINE-acetaminophen (Tylenol PM)  mg per tablet 1 tablet, oral, Nightly PRN    docusate sodium (COLACE) 100 mg, oral, Daily    donepezil (ARICEPT) 10 mg, oral, Nightly    krill oil 500 mg capsule oral    melatonin 5 mg capsule 1 capsule, oral, Nightly PRN    mirabegron (MYRBETRIQ) 50 mg, oral, Daily    multivit-minerals/folic acid (CENTRUM ADULTS ORAL) 1 tablet, oral, Daily    NIFEdipine ER (ADALAT CC) 90 mg, oral, Daily    rOPINIRole (REQUIP) 0.5 mg, oral, Nightly      Labs  Lab Results   Component Value Date    PSA 2.44 03/12/2022     Lab Results   Component Value Date    GFRMALE 39 (A) 10/15/2022     Lab Results   Component Value Date    CREATININE 1.96 (H) 04/04/2024     Lab Results   Component Value Date    CHOL 132 10/25/2023     Lab Results   Component Value Date    HDL 49 (A) 10/25/2023     Lab  Results   Component Value Date    CHHDL 2.7 10/25/2023     Lab Results   Component Value Date    TRIG 135 10/25/2023     Lab Results   Component Value Date    HCT 41.8 04/04/2024       Assessment:      1. Urinary frequency        2. OAB (overactive bladder)        3. Mixed stress and urge urinary incontinence          Marlo Carl is a 83 y.o. male here for FUV     Plan:   Doing very well with advanced trial.  Staying dry at night.  Voiding with good stream.  Wants to proceed to stage 2 next week.    Patient continues to have bothersome urinary symptoms despite conservative measures and had tried at least 2 medications (anti-cholinergic or beta-agonist).  We discussed OAB pathway as above.  We went into detail about Axonics neuromodulation.  We discussed that this is a long-term treatment that helps to restore communication between the brain, bladder and bowel.    We discussed the peripheral nerve evaluation (PNE).  That it would be done in the office setting with local anesthetic.  Two tiny wires, or leads, would be inserted in the upper buttock.  This would be guided by bony landmarks and patients sensory responses.  Once we confirmed the correct location the leads would be connected to an external device and secured to the patients back.  They would return home with no major restrictions, just no showering or strenuous activity.  They will keep a voiding diary to monitor their responses. They will follow up in office the week after to have the leads removed and review the voiding diary.  A successful trial is deemed if >50% response.    If we decide to move forward with permanent implant that will be done in the operating room with light sedation.  A permanent lead would be placed under fluoroscopic guidance to confirm correct positioning.  This is then connected to an internal pulse generator which can last up to 20 years.    Risks of change in sensation, pain, irritation, bleeding, movement of the lead and  implant infection discussed.    Patient in agreement and wants to proceed.

## 2024-04-19 NOTE — H&P (VIEW-ONLY)
POV    Virtual or Telephone Consent    An interactive audio and video telecommunication system which permits real time communications between the patient (at the originating site) and provider (at the distant site) was utilized to provide this telehealth service.   Verbal consent was requested and obtained from Marlo Carl on this date, 04/19/24 for a telehealth visit.       HISTORY OF PRESENT ILLNESS:   Marol Carl is a 83 y.o. male who is being seen today for fuv    H/o BPH w/ LUTS, OAB symptoms and UUI.  Has failed bladder botox (2019) and myrbetriq 25 mg for his UUI.   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   Has tried condom catheterization in the past, but it would not stay on.  Has tried ISC in the past, however only produced low volumes thus discontinued.  Continues to leak, worse at night, wears precautionary pads for this.      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  PSH botox, hyrocelectomy  FH of cancer, unspecified  Former smoker (quit in [id number])    AUA SS 26, nocturia 5x, QOL 5  Not sexually active    Had Axonic PNE in office on 3/8/24.  Had one good day then leads accidentally got dislodged.  Removed in office on 3/11/24. They do think that it helped.  Was wetting less at night.      Had advanced trial on 4/17/24. Lead placed on right with excellent motor responses.      4/19/24 - Has been doing well.  Staying dry overnight.  Voiding on his own.  Ok urinary stream.  >50% improvement    PAST MEDICAL HISTORY:  Past Medical History:   Diagnosis Date    Anemia     Arthritis     BPH with obstruction/lower urinary tract symptoms     Cerebral vascular accident (Multi)     1996, 2016 w/ residual left sided weakness, CT Head 9/8/22  Large area of encephalomalacia of the right MCA territory, unchanged since prior. 3. Advanced chronic microvascular ischemic changes and chronic left basal ganglia lacunar infarct.    CKD (chronic kidney disease)      Cognitive decline     Dementia (Multi)     on donzepril    Depression     Hearing aid worn     refuses to wear    HL (hearing loss)     Hypertension     OAB (overactive bladder)     PE (pulmonary thromboembolism) (Multi) 09/07/2018    off anticoags    Resting tremor     mild hands    RLS (restless legs syndrome)     Spinal stenosis     Unspecified urinary incontinence        PAST SURGICAL HISTORY:  Past Surgical History:   Procedure Laterality Date    CARPAL TUNNEL RELEASE Bilateral     CATARACT EXTRACTION Bilateral 2006    EXCISION / REPAIR HYDROCELE PEDIATRIC Left 12/2022    TRANSURETHRAL RESECTION OF PROSTATE  2016        ALLERGIES:   No Known Allergies     MEDICATIONS:   Current Outpatient Medications   Medication Instructions    aspirin 81 mg EC tablet 1 tablet, oral, Daily    atorvastatin (Lipitor) 20 mg tablet 1 tablet, oral, Daily    chlorhexidine (Peridex) 0.12 % solution 15 ml swish and spit for 30 seconds night prior to surgery and morning of surgery    cranberry fruit extract (CRANBERRY CONCENTRATE ORAL) 30,000 mg, oral, Daily    cyclobenzaprine (FLEXERIL) 5 mg, oral, 3 times daily PRN    diphenhydrAMINE-acetaminophen (Tylenol PM)  mg per tablet 1 tablet, oral, Nightly PRN    docusate sodium (COLACE) 100 mg, oral, Daily    donepezil (ARICEPT) 10 mg, oral, Nightly    krill oil 500 mg capsule oral    melatonin 5 mg capsule 1 capsule, oral, Nightly PRN    mirabegron (MYRBETRIQ) 50 mg, oral, Daily    multivit-minerals/folic acid (CENTRUM ADULTS ORAL) 1 tablet, oral, Daily    NIFEdipine ER (ADALAT CC) 90 mg, oral, Daily    rOPINIRole (REQUIP) 0.5 mg, oral, Nightly      Labs  Lab Results   Component Value Date    PSA 2.44 03/12/2022     Lab Results   Component Value Date    GFRMALE 39 (A) 10/15/2022     Lab Results   Component Value Date    CREATININE 1.96 (H) 04/04/2024     Lab Results   Component Value Date    CHOL 132 10/25/2023     Lab Results   Component Value Date    HDL 49 (A) 10/25/2023     Lab  Results   Component Value Date    CHHDL 2.7 10/25/2023     Lab Results   Component Value Date    TRIG 135 10/25/2023     Lab Results   Component Value Date    HCT 41.8 04/04/2024       Assessment:      1. Urinary frequency        2. OAB (overactive bladder)        3. Mixed stress and urge urinary incontinence          Marlo Carl is a 83 y.o. male here for FUV     Plan:   Doing very well with advanced trial.  Staying dry at night.  Voiding with good stream.  Wants to proceed to stage 2 next week.    Patient continues to have bothersome urinary symptoms despite conservative measures and had tried at least 2 medications (anti-cholinergic or beta-agonist).  We discussed OAB pathway as above.  We went into detail about [employer] neuromodulation.  We discussed that this is a long-term treatment that helps to restore communication between the brain, bladder and bowel.    We discussed the peripheral nerve evaluation (PNE).  That it would be done in the office setting with local anesthetic.  Two tiny wires, or leads, would be inserted in the upper buttock.  This would be guided by bony landmarks and patients sensory responses.  Once we confirmed the correct location the leads would be connected to an external device and secured to the patients back.  They would return home with no major restrictions, just no showering or strenuous activity.  They will keep a voiding diary to monitor their responses. They will follow up in office the week after to have the leads removed and review the voiding diary.  A successful trial is deemed if >50% response.    If we decide to move forward with permanent implant that will be done in the operating room with light sedation.  A permanent lead would be placed under fluoroscopic guidance to confirm correct positioning.  This is then connected to an internal pulse generator which can last up to 20 years.    Risks of change in sensation, pain, irritation, bleeding, movement of the lead and  implant infection discussed.    Patient in agreement and wants to proceed.

## 2024-04-23 ENCOUNTER — TELEPHONE (OUTPATIENT)
Dept: UROLOGY | Facility: HOSPITAL | Age: 84
End: 2024-04-23

## 2024-04-23 NOTE — TELEPHONE ENCOUNTER
Nurse called patient to inform patient the surgery scheduled for 4/24/24 needs to be post poned to 4/26/24 due to expedited appeal through [employer] medicare insurance company. Ref # [id number] case # [id number]. Will call tomorrow to get update on appeal status. Nurse left patient a vm regarding this information.

## 2024-04-30 ENCOUNTER — ANESTHESIA EVENT (OUTPATIENT)
Dept: OPERATING ROOM | Facility: HOSPITAL | Age: 84
End: 2024-04-30
Payer: MEDICARE

## 2024-05-01 ENCOUNTER — ANESTHESIA (OUTPATIENT)
Dept: OPERATING ROOM | Facility: HOSPITAL | Age: 84
End: 2024-05-01
Payer: MEDICARE

## 2024-05-01 ENCOUNTER — HOSPITAL ENCOUNTER (OUTPATIENT)
Facility: HOSPITAL | Age: 84
Setting detail: OUTPATIENT SURGERY
Discharge: HOME | End: 2024-05-01
Attending: UROLOGY | Admitting: UROLOGY
Payer: MEDICARE

## 2024-05-01 VITALS
BODY MASS INDEX: 24.01 KG/M2 | RESPIRATION RATE: 17 BRPM | HEIGHT: 71 IN | TEMPERATURE: 97.9 F | WEIGHT: 171.52 LBS | HEART RATE: 61 BPM | DIASTOLIC BLOOD PRESSURE: 68 MMHG | SYSTOLIC BLOOD PRESSURE: 142 MMHG | OXYGEN SATURATION: 97 %

## 2024-05-01 DIAGNOSIS — N32.81 OAB (OVERACTIVE BLADDER): Primary | ICD-10-CM

## 2024-05-01 DIAGNOSIS — G89.18 POST-OP PAIN: ICD-10-CM

## 2024-05-01 PROCEDURE — 2500000005 HC RX 250 GENERAL PHARMACY W/O HCPCS

## 2024-05-01 PROCEDURE — 3600000009 HC OR TIME - EACH INCREMENTAL 1 MINUTE - PROCEDURE LEVEL FOUR: Performed by: UROLOGY

## 2024-05-01 PROCEDURE — 2500000004 HC RX 250 GENERAL PHARMACY W/ HCPCS (ALT 636 FOR OP/ED): Performed by: UROLOGY

## 2024-05-01 PROCEDURE — 7100000010 HC PHASE TWO TIME - EACH INCREMENTAL 1 MINUTE: Performed by: UROLOGY

## 2024-05-01 PROCEDURE — 99100 ANES PT EXTEME AGE<1 YR&>70: CPT | Performed by: ANESTHESIOLOGY

## 2024-05-01 PROCEDURE — A64590 PR IMPLANT PERIPH/GASTRIC NEUROSTIM/RECEIVER

## 2024-05-01 PROCEDURE — 7100000009 HC PHASE TWO TIME - INITIAL BASE CHARGE: Performed by: UROLOGY

## 2024-05-01 PROCEDURE — 2780000003 HC OR 278 NO HCPCS: Performed by: UROLOGY

## 2024-05-01 PROCEDURE — 3700000001 HC GENERAL ANESTHESIA TIME - INITIAL BASE CHARGE: Performed by: UROLOGY

## 2024-05-01 PROCEDURE — 2500000005 HC RX 250 GENERAL PHARMACY W/O HCPCS: Performed by: UROLOGY

## 2024-05-01 PROCEDURE — 2500000004 HC RX 250 GENERAL PHARMACY W/ HCPCS (ALT 636 FOR OP/ED)

## 2024-05-01 PROCEDURE — 3700000002 HC GENERAL ANESTHESIA TIME - EACH INCREMENTAL 1 MINUTE: Performed by: UROLOGY

## 2024-05-01 PROCEDURE — 64590 INS/RPL PRPH SAC/GSTR NPG/R: CPT | Performed by: UROLOGY

## 2024-05-01 PROCEDURE — 7100000001 HC RECOVERY ROOM TIME - INITIAL BASE CHARGE: Performed by: UROLOGY

## 2024-05-01 PROCEDURE — 2500000004 HC RX 250 GENERAL PHARMACY W/ HCPCS (ALT 636 FOR OP/ED): Performed by: ANESTHESIOLOGY

## 2024-05-01 PROCEDURE — A64590 PR IMPLANT PERIPH/GASTRIC NEUROSTIM/RECEIVER: Performed by: ANESTHESIOLOGY

## 2024-05-01 PROCEDURE — 3600000004 HC OR TIME - INITIAL BASE CHARGE - PROCEDURE LEVEL FOUR: Performed by: UROLOGY

## 2024-05-01 PROCEDURE — C1767 GENERATOR, NEURO NON-RECHARG: HCPCS | Performed by: UROLOGY

## 2024-05-01 PROCEDURE — 7100000002 HC RECOVERY ROOM TIME - EACH INCREMENTAL 1 MINUTE: Performed by: UROLOGY

## 2024-05-01 PROCEDURE — 2720000007 HC OR 272 NO HCPCS: Performed by: UROLOGY

## 2024-05-01 DEVICE — NEUROSTIMULATOR
Type: IMPLANTABLE DEVICE | Site: FLANK | Status: FUNCTIONAL
Brand: AXONICS

## 2024-05-01 RX ORDER — ONDANSETRON HYDROCHLORIDE 2 MG/ML
4 INJECTION, SOLUTION INTRAVENOUS ONCE AS NEEDED
Status: DISCONTINUED | OUTPATIENT
Start: 2024-05-01 | End: 2024-05-01 | Stop reason: HOSPADM

## 2024-05-01 RX ORDER — SODIUM CHLORIDE, SODIUM LACTATE, POTASSIUM CHLORIDE, CALCIUM CHLORIDE 600; 310; 30; 20 MG/100ML; MG/100ML; MG/100ML; MG/100ML
100 INJECTION, SOLUTION INTRAVENOUS CONTINUOUS
Status: DISCONTINUED | OUTPATIENT
Start: 2024-05-01 | End: 2024-05-01 | Stop reason: HOSPADM

## 2024-05-01 RX ORDER — OXYCODONE HYDROCHLORIDE 5 MG/1
5 TABLET ORAL EVERY 4 HOURS PRN
Status: DISCONTINUED | OUTPATIENT
Start: 2024-05-01 | End: 2024-05-01 | Stop reason: HOSPADM

## 2024-05-01 RX ORDER — PROPOFOL 10 MG/ML
INJECTION, EMULSION INTRAVENOUS CONTINUOUS PRN
Status: DISCONTINUED | OUTPATIENT
Start: 2024-05-01 | End: 2024-05-01

## 2024-05-01 RX ORDER — CEFAZOLIN 1 G/1
INJECTION, POWDER, FOR SOLUTION INTRAVENOUS AS NEEDED
Status: DISCONTINUED | OUTPATIENT
Start: 2024-05-01 | End: 2024-05-01

## 2024-05-01 RX ORDER — AMOXICILLIN 250 MG
1 CAPSULE ORAL DAILY
Qty: 7 TABLET | Refills: 0 | Status: SHIPPED | OUTPATIENT
Start: 2024-05-01 | End: 2024-05-08

## 2024-05-01 RX ORDER — ACETAMINOPHEN 325 MG/1
975 TABLET ORAL ONCE
Status: COMPLETED | OUTPATIENT
Start: 2024-05-01 | End: 2024-05-01

## 2024-05-01 RX ORDER — ONDANSETRON HYDROCHLORIDE 2 MG/ML
INJECTION, SOLUTION INTRAVENOUS AS NEEDED
Status: DISCONTINUED | OUTPATIENT
Start: 2024-05-01 | End: 2024-05-01

## 2024-05-01 RX ORDER — LIDOCAINE HYDROCHLORIDE 20 MG/ML
INJECTION, SOLUTION EPIDURAL; INFILTRATION; INTRACAUDAL; PERINEURAL AS NEEDED
Status: DISCONTINUED | OUTPATIENT
Start: 2024-05-01 | End: 2024-05-01

## 2024-05-01 RX ORDER — ALBUTEROL SULFATE 0.83 MG/ML
2.5 SOLUTION RESPIRATORY (INHALATION) ONCE AS NEEDED
Status: DISCONTINUED | OUTPATIENT
Start: 2024-05-01 | End: 2024-05-01 | Stop reason: HOSPADM

## 2024-05-01 RX ORDER — OXYCODONE HYDROCHLORIDE 5 MG/1
5 TABLET ORAL EVERY 6 HOURS PRN
Qty: 8 TABLET | Refills: 0 | Status: SHIPPED | OUTPATIENT
Start: 2024-05-01

## 2024-05-01 RX ORDER — HYDRALAZINE HYDROCHLORIDE 20 MG/ML
5 INJECTION INTRAMUSCULAR; INTRAVENOUS EVERY 30 MIN PRN
Status: DISCONTINUED | OUTPATIENT
Start: 2024-05-01 | End: 2024-05-01 | Stop reason: HOSPADM

## 2024-05-01 RX ORDER — DIPHENHYDRAMINE HYDROCHLORIDE 50 MG/ML
12.5 INJECTION INTRAMUSCULAR; INTRAVENOUS ONCE AS NEEDED
Status: DISCONTINUED | OUTPATIENT
Start: 2024-05-01 | End: 2024-05-01 | Stop reason: HOSPADM

## 2024-05-01 RX ORDER — LIDOCAINE HYDROCHLORIDE 10 MG/ML
INJECTION, SOLUTION EPIDURAL; INFILTRATION; INTRACAUDAL; PERINEURAL AS NEEDED
Status: DISCONTINUED | OUTPATIENT
Start: 2024-05-01 | End: 2024-05-01 | Stop reason: HOSPADM

## 2024-05-01 RX ORDER — CEFAZOLIN SODIUM 2 G/100ML
2 INJECTION, SOLUTION INTRAVENOUS ONCE
Status: DISCONTINUED | OUTPATIENT
Start: 2024-05-01 | End: 2024-05-01 | Stop reason: HOSPADM

## 2024-05-01 RX ORDER — FENTANYL CITRATE 50 UG/ML
INJECTION, SOLUTION INTRAMUSCULAR; INTRAVENOUS AS NEEDED
Status: DISCONTINUED | OUTPATIENT
Start: 2024-05-01 | End: 2024-05-01

## 2024-05-01 RX ADMIN — PROPOFOL 100 MCG/KG/MIN: 10 INJECTION, EMULSION INTRAVENOUS at 09:02

## 2024-05-01 RX ADMIN — LIDOCAINE HYDROCHLORIDE 100 MG: 20 INJECTION, SOLUTION EPIDURAL; INFILTRATION; INTRACAUDAL; PERINEURAL at 09:02

## 2024-05-01 RX ADMIN — ONDANSETRON 4 MG: 2 INJECTION INTRAMUSCULAR; INTRAVENOUS at 09:15

## 2024-05-01 RX ADMIN — SODIUM CHLORIDE, POTASSIUM CHLORIDE, SODIUM LACTATE AND CALCIUM CHLORIDE: 600; 310; 30; 20 INJECTION, SOLUTION INTRAVENOUS at 08:52

## 2024-05-01 RX ADMIN — SODIUM CHLORIDE, POTASSIUM CHLORIDE, SODIUM LACTATE AND CALCIUM CHLORIDE 100 ML/HR: 600; 310; 30; 20 INJECTION, SOLUTION INTRAVENOUS at 08:30

## 2024-05-01 RX ADMIN — FENTANYL CITRATE 50 MCG: 50 INJECTION, SOLUTION INTRAMUSCULAR; INTRAVENOUS at 09:02

## 2024-05-01 RX ADMIN — ACETAMINOPHEN 975 MG: 325 TABLET ORAL at 08:30

## 2024-05-01 RX ADMIN — CEFAZOLIN 2 G: 1 INJECTION, POWDER, FOR SOLUTION INTRAMUSCULAR; INTRAVENOUS at 09:06

## 2024-05-01 SDOH — HEALTH STABILITY: MENTAL HEALTH: CURRENT SMOKER: 0

## 2024-05-01 ASSESSMENT — PAIN SCALES - GENERAL
PAINLEVEL_OUTOF10: 0 - NO PAIN

## 2024-05-01 ASSESSMENT — PAIN - FUNCTIONAL ASSESSMENT
PAIN_FUNCTIONAL_ASSESSMENT: 0-10

## 2024-05-01 ASSESSMENT — COLUMBIA-SUICIDE SEVERITY RATING SCALE - C-SSRS
2. HAVE YOU ACTUALLY HAD ANY THOUGHTS OF KILLING YOURSELF?: NO
6. HAVE YOU EVER DONE ANYTHING, STARTED TO DO ANYTHING, OR PREPARED TO DO ANYTHING TO END YOUR LIFE?: NO
1. IN THE PAST MONTH, HAVE YOU WISHED YOU WERE DEAD OR WISHED YOU COULD GO TO SLEEP AND NOT WAKE UP?: NO

## 2024-05-01 NOTE — ANESTHESIA PREPROCEDURE EVALUATION
Patient: Marlo Carl    Procedure Information       Date/Time: 05/01/24 0900    Procedure: Stage 2 Continence Control Stimulator Generator Insertion    Location: [facility] OR [facility] / Virtual [facility] OR    Surgeons: Vishal Delgado MD            Relevant Problems   Anesthesia (within normal limits)      Cardiac   (+) Acute septic pulmonary embolism with acute cor pulmonale (Multi)   (+) Hyperlipidemia   (+) Hypertension      Pulmonary   (+) Acute septic pulmonary embolism with acute cor pulmonale (Multi)   (+) Pulmonary embolism (Multi)      Neuro   (+) CVA (cerebral vascular accident) (Multi) (2 CVAs. Last 20 years ago. Left sided weakness)   (+) Vascular dementia (Multi)      /Renal   (+) BPH (benign prostatic hyperplasia)   (+) Bacterial UTI   (+) Chronic renal insufficiency   (+) Enlarged prostate with lower urinary tract symptoms (LUTS)      Hematology   (+) Anemia      Musculoskeletal   (+) Gouty arthritis   (+) Lumbar spinal stenosis   (+) Osteoarthritis of cervical spine with myelopathy   (+) Primary osteoarthritis, right hand      HEENT   (+) Bilateral sensorineural hearing loss   (+) Hearing loss      ID   (+) Bacterial UTI   (+) Viral upper respiratory tract infection       Clinical information reviewed:   Tobacco  Allergies  Meds   Med Hx  Surg Hx   Fam Hx  Soc Hx        NPO Detail:  NPO/Void Status  Carbohydrate Drink Given Prior to Surgery? : N  Date of Last Liquid: 04/30/24  Time of Last Liquid: 2130  Date of Last Solid: 04/30/24  Time of Last Solid: 2130  Last Intake Type: Clear fluids  Time of Last Void: 0645         Physical Exam    Airway  Mallampati: III  TM distance: >3 FB  Neck ROM: full     Cardiovascular    Dental    Pulmonary    Abdominal            Anesthesia Plan    History of general anesthesia?: yes  History of complications of general anesthesia?: no    ASA 3     MAC     The patient is not a current smoker.    intravenous induction   Anesthetic plan and risks discussed with patient and  spouse.    Plan discussed with CAA.

## 2024-05-01 NOTE — OP NOTE
Stage 2 Continence Control Stimulator Generator Insertion Operative Note     Date: 2024  OR Location: [facility] A OR    Name: Marlo Carl, : 1940, Age: 84 y.o., MRN: [id number], Sex: male    Diagnosis  Pre-op Diagnosis     * OAB (overactive bladder) [N32.81] Post-op Diagnosis     * OAB (overactive bladder) [N32.81]     Procedures  Stage 2 Continence Control Stimulator Generator Insertion  69348 - NM INS/RPLC PERPH SAC/GSTRC NPG/RCVR PCKT CRTJ&CONN      Surgeons      * Vishal Chiangeb - Primary    Resident/Fellow/Other Assistant:  Surgeons and Role:  * No surgeons found with a matching role *    Procedure Summary  Anesthesia: Monitor Anesthesia Care  ASA: III  Anesthesia Staff: Anesthesiologist: Shane Adler MD  C-AA: MELIDA Buckley  Estimated Blood Loss: 2mL  Intra-op Medications:   Administrations occurring from 0900 to 1030 on 24:   Medication Name Total Dose   lidocaine PF (Xylocaine) 10 mg/mL (1 %) injection 20 mL   lactated Ringer's infusion Cannot be calculated              Anesthesia Record               Intraprocedure I/O Totals          Intake    Propofol Drip 0.00 mL    The total shown is the total volume documented since Anesthesia Start was filed.    lactated Ringer's infusion 200.00 mL    Total Intake 200 mL          Specimen: No specimens collected     Staff:   Circulator: Susana Ocasio RN  Scrub Person: Imelda Elizabeth        Implants:  Implants       Type Name Action Serial No.      Neuro Interventional Implant NEUROSTIMULATOR, F15 - [id number] - [id number] Implanted [id number]              Findings: permanent stimulator placed in right     Indications: Marlo Carl is an 84 y.o. male who is having surgery for OAB (overactive bladder) [N32.81].         The patient was seen in the preoperative area. The risks, benefits, complications, treatment options, non-operative alternatives, expected recovery and outcomes were discussed with the patient. The possibilities of reaction to medication,  pulmonary aspiration, injury to surrounding structures, bleeding, recurrent infection, the need for additional procedures, failure to diagnose a condition, and creating a complication requiring transfusion or operation were discussed with the patient. The patient concurred with the proposed plan, giving informed consent.  The site of surgery was properly noted/marked if necessary per policy. The patient has been actively warmed in preoperative area. Preoperative antibiotics have been ordered and given within 1 hours of incision. Venous thrombosis prophylaxis have been ordered including bilateral sequential compression devices    Procedure Details: After surgical consent was reviewed with the patient, including risks, benefits and alternatives, the patient expressed desire to proceed with surgery.  The patient was then taken to the operating room and placed in the prone position. Pillows were placed in the lower abdomen and hips to level and flatten the sacrum and allow the toes to dangle freely.  Intravenous Ancef was infused 30 minutes prior to incision for infection prophylaxis.    The previously placed temporary lead was untaped from the patient and the field was prepped and draped, leaving this off the field. Local anesthetic was injected. The previous incision over the right INS site was opened with a knife and then electrocautery. The lead was disconnected from the temporary equipment and this was removed in its entirety.  The lead was cleaned and dried and connected to the [employer] INS by inserting the lead into the header of the neurostimulator until the white tip was visualized in the strain relief. The single set screw was tightened using the torque wrench.     The pocket was made a bit bigger and the neurostimulator was placed into the pocket. Any excessive lead was coiled and placed behind the neurostimulator. Impedances were confirmed to be within normal limits.     The incisions were irrigated with  antibiotic solution in sterile water and the INS incision was closed with 2-0 Vicryl for the subcutaneous layer, skin was closed with running 4-0 Monocryl skin sutures. Skin glue was applied and covered with sterile dressing.       Instrument, sponge, and needle counts were correct times two.    Complications:  None; patient tolerated the procedure well.    Disposition: PACU - hemodynamically stable.  Condition: stable       Attending Attestation:  Dr. Delgado was present and scrubbed for the entire procedure.     Nan Munson MD for     Vishal Delgado  Phone Number: [phone]

## 2024-05-01 NOTE — DISCHARGE INSTRUCTIONS
Call Dr. Delgado for any problems and/or concerns    *No lifting/straining greater than 10 pounds  *You may shower 48 hours after your surgery   *Prevent constipation by using stool softeners and staying hydrated, so that you do not strain against your stitches or have pain from constipation    Call Doctor right away for:    *Fever above 100.4/shaking chills  *A foul smelling discharge    *Persistent nausea/vomiting  *Redness, swelling, or drainage at your incision sites  *Chest pain/shortness of breath-call 911.    - Can take over the counter pain medication as needed., alternate a dose of Acetaminophen (Tylenol) and Ibuprofen (Motrin) every 3 hours.

## 2024-05-13 ENCOUNTER — APPOINTMENT (OUTPATIENT)
Dept: PRIMARY CARE | Facility: CLINIC | Age: 84
End: 2024-05-13
Payer: MEDICARE

## 2024-05-28 ENCOUNTER — OFFICE VISIT (OUTPATIENT)
Dept: UROLOGY | Facility: HOSPITAL | Age: 84
End: 2024-05-28
Payer: MEDICARE

## 2024-05-28 DIAGNOSIS — N32.81 OVERACTIVE BLADDER: ICD-10-CM

## 2024-05-28 DIAGNOSIS — R35.0 URINARY FREQUENCY: ICD-10-CM

## 2024-05-28 DIAGNOSIS — N39.46 MIXED STRESS AND URGE URINARY INCONTINENCE: Primary | ICD-10-CM

## 2024-05-28 PROCEDURE — 95972 ALYS CPLX SP/PN NPGT W/PRGRM: CPT | Performed by: UROLOGY

## 2024-05-28 PROCEDURE — 99213 OFFICE O/P EST LOW 20 MIN: CPT | Performed by: UROLOGY

## 2024-05-28 PROCEDURE — 1157F ADVNC CARE PLAN IN RCRD: CPT | Performed by: UROLOGY

## 2024-05-28 PROCEDURE — 1159F MED LIST DOCD IN RCRD: CPT | Performed by: UROLOGY

## 2024-05-28 PROCEDURE — 1160F RVW MEDS BY RX/DR IN RCRD: CPT | Performed by: UROLOGY

## 2024-05-28 NOTE — PROGRESS NOTES
FUV    HISTORY OF PRESENT ILLNESS:   Marlo Carl is a 84 y.o. male who is being seen today for fuv    H/o BPH w/ LUTS, OAB symptoms and UUI.  Has failed bladder botox (2019) and myrbetriq 25 mg for his UUI.   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   Has tried condom catheterization in the past, but it would not stay on.  Has tried ISC in the past, however only produced low volumes thus discontinued.  Continues to leak, worse at night, wears precautionary pads for this.      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  PSH botox, hyrocelectomy  FH of cancer, unspecified  Former smoker (quit in [id number])    AUA SS 26, nocturia 5x, QOL 5  Not sexually active    Had Axonic PNE in office on 3/8/24.  Had one good day then leads accidentally got dislodged.  Removed in office on 3/11/24. They do think that it helped.  Was wetting less at night.      Had advanced trial on 4/17/24. Lead placed on right with excellent motor responses.      4/19/24 - Has been doing well.  Staying dry overnight.  Voiding on his own.  Ok urinary stream.  >50% improvement    5/1/24 - Stage 2 in OR    5/28/24 - Doing well, symptoms improved    PAST MEDICAL HISTORY:  Past Medical History:   Diagnosis Date    Anemia     Arthritis     BPH with obstruction/lower urinary tract symptoms     Cerebral vascular accident (Multi)     1996, 2016 w/ residual left sided weakness, CT Head 9/8/22  Large area of encephalomalacia of the right MCA territory, unchanged since prior. 3. Advanced chronic microvascular ischemic changes and chronic left basal ganglia lacunar infarct.    CKD (chronic kidney disease)     Cognitive decline     Dementia (Multi)     on donzepril    Depression     Hearing aid worn     refuses to wear    HL (hearing loss)     Hypertension     OAB (overactive bladder)     PE (pulmonary thromboembolism) (Multi) 09/07/2018    off anticoags    Resting tremor     mild hands    RLS (restless  legs syndrome)     Spinal stenosis     Unspecified urinary incontinence        PAST SURGICAL HISTORY:  Past Surgical History:   Procedure Laterality Date    CARPAL TUNNEL RELEASE Bilateral     CATARACT EXTRACTION Bilateral 2006    EXCISION / REPAIR HYDROCELE PEDIATRIC Left 12/2022    TRANSURETHRAL RESECTION OF PROSTATE  2016        ALLERGIES:   No Known Allergies     MEDICATIONS:   Current Outpatient Medications   Medication Instructions    aspirin 81 mg EC tablet 1 tablet, oral, Daily    atorvastatin (Lipitor) 20 mg tablet 1 tablet, oral, Daily    chlorhexidine (Peridex) 0.12 % solution 15 ml swish and spit for 30 seconds night prior to surgery and morning of surgery    cranberry fruit extract (CRANBERRY CONCENTRATE ORAL) 30,000 mg, oral, Daily    diphenhydrAMINE-acetaminophen (Tylenol PM)  mg per tablet 1 tablet, oral, Nightly PRN    docusate sodium (COLACE) 100 mg, oral, Daily    donepezil (ARICEPT) 10 mg, oral, Nightly    krill oil 500 mg capsule oral    melatonin 5 mg capsule 1 capsule, oral, Nightly PRN    mirabegron (MYRBETRIQ) 50 mg, oral, Daily    multivit-minerals/folic acid (CENTRUM ADULTS ORAL) 1 tablet, oral, Daily    NIFEdipine ER (ADALAT CC) 90 mg, oral, Daily    oxyCODONE (ROXICODONE) 5 mg, oral, Every 6 hours PRN    rOPINIRole (REQUIP) 0.5 mg, oral, Nightly      Labs  Lab Results   Component Value Date    PSA 2.44 03/12/2022     Lab Results   Component Value Date    GFRMALE 39 (A) 10/15/2022     Lab Results   Component Value Date    CREATININE 1.96 (H) 04/04/2024     Lab Results   Component Value Date    CHOL 132 10/25/2023     Lab Results   Component Value Date    HDL 49 (A) 10/25/2023     Lab Results   Component Value Date    CHHDL 2.7 10/25/2023     Lab Results   Component Value Date    TRIG 135 10/25/2023     Lab Results   Component Value Date    HCT 41.8 04/04/2024       Assessment:      1. Mixed stress and urge urinary incontinence        2. Overactive bladder        3. Urinary frequency           Marlo Carl is a 84 y.o. male here for FUV     Plan:   Doing well  Setting adjusted, at 1.5/1.9    FU in 3m, sooner if needed    OAB    We discussed the pathophysiology of overactive bladder. We discussed possible treatment options including doing conservative voiding techniques, medications, and surgical options.. He was counseled regarding bladder retraining, diet choices, and fluid restriction.  A handout about this given as well as a voiding diary    Patient was informed that first line treatment is behavioral therapy.  This includes:   -Fluid balancing and sometimes restriction   -Reducing caffeine or other bladder stimulants   -Bladder retraining  -Delayed voiding to help defer the overwhelming urge    Patient was informed that second line treatment includes medications. We discussed Mirabegron and that the side effects include possible increase in blood pressure in a small minority of patients, however insurance does not always cover this. We also discussed anticholinergic medications which can have the side effects of dry eyes, dry mouth, constipation and rarely cognitive changes.    I mentioned 3rd line management options of neuromodulation and Botox injections as well

## 2024-07-01 ENCOUNTER — APPOINTMENT (OUTPATIENT)
Dept: PRIMARY CARE | Facility: CLINIC | Age: 84
End: 2024-07-01
Payer: MEDICARE

## 2024-07-01 VITALS
WEIGHT: 165 LBS | TEMPERATURE: 98.2 F | DIASTOLIC BLOOD PRESSURE: 80 MMHG | SYSTOLIC BLOOD PRESSURE: 120 MMHG | HEART RATE: 68 BPM | BODY MASS INDEX: 23.01 KG/M2 | RESPIRATION RATE: 16 BRPM

## 2024-07-01 DIAGNOSIS — Z00.00 ROUTINE GENERAL MEDICAL EXAMINATION AT HEALTH CARE FACILITY: Primary | ICD-10-CM

## 2024-07-01 DIAGNOSIS — E78.49 OTHER HYPERLIPIDEMIA: ICD-10-CM

## 2024-07-01 DIAGNOSIS — N18.32 STAGE 3B CHRONIC KIDNEY DISEASE (MULTI): ICD-10-CM

## 2024-07-01 DIAGNOSIS — I10 PRIMARY HYPERTENSION: ICD-10-CM

## 2024-07-01 DIAGNOSIS — Z13.31 DEPRESSION SCREENING: ICD-10-CM

## 2024-07-01 DIAGNOSIS — F01.A0 MILD VASCULAR DEMENTIA WITHOUT BEHAVIORAL DISTURBANCE, PSYCHOTIC DISTURBANCE, MOOD DISTURBANCE, OR ANXIETY (MULTI): ICD-10-CM

## 2024-07-01 PROCEDURE — 1160F RVW MEDS BY RX/DR IN RCRD: CPT | Performed by: INTERNAL MEDICINE

## 2024-07-01 PROCEDURE — 3074F SYST BP LT 130 MM HG: CPT | Performed by: INTERNAL MEDICINE

## 2024-07-01 PROCEDURE — 1157F ADVNC CARE PLAN IN RCRD: CPT | Performed by: INTERNAL MEDICINE

## 2024-07-01 PROCEDURE — 99214 OFFICE O/P EST MOD 30 MIN: CPT | Performed by: INTERNAL MEDICINE

## 2024-07-01 PROCEDURE — 1159F MED LIST DOCD IN RCRD: CPT | Performed by: INTERNAL MEDICINE

## 2024-07-01 PROCEDURE — 1036F TOBACCO NON-USER: CPT | Performed by: INTERNAL MEDICINE

## 2024-07-01 PROCEDURE — 3079F DIAST BP 80-89 MM HG: CPT | Performed by: INTERNAL MEDICINE

## 2024-07-01 PROCEDURE — G0444 DEPRESSION SCREEN ANNUAL: HCPCS | Performed by: INTERNAL MEDICINE

## 2024-07-01 PROCEDURE — G0439 PPPS, SUBSEQ VISIT: HCPCS | Performed by: INTERNAL MEDICINE

## 2024-07-01 ASSESSMENT — ENCOUNTER SYMPTOMS
LOSS OF SENSATION IN FEET: 0
DEPRESSION: 0
OCCASIONAL FEELINGS OF UNSTEADINESS: 1

## 2024-07-01 ASSESSMENT — PATIENT HEALTH QUESTIONNAIRE - PHQ9
10. IF YOU CHECKED OFF ANY PROBLEMS, HOW DIFFICULT HAVE THESE PROBLEMS MADE IT FOR YOU TO DO YOUR WORK, TAKE CARE OF THINGS AT HOME, OR GET ALONG WITH OTHER PEOPLE: SOMEWHAT DIFFICULT
1. LITTLE INTEREST OR PLEASURE IN DOING THINGS: SEVERAL DAYS
SUM OF ALL RESPONSES TO PHQ9 QUESTIONS 1 AND 2: 2
2. FEELING DOWN, DEPRESSED OR HOPELESS: SEVERAL DAYS

## 2024-07-01 NOTE — PROGRESS NOTES
"Marlo Carl is a 84 y.o. male here for a Medicare Wellness Exam.    No chief complaint on file.       Patient with a past medical history of HTN, HLD, BPH with LUTS, CKD III. Lumbar Stenosis, Pulmonary Embolism, Vascular Dementia, OAB, Incontinence     Sees Urology had another procedure  Still with nocturnal enuresis  Is incontinent      No chest pain/  SOB/ dizziness  BM constipated  Energy level ok  Appetite OK    Slowing down even more physically  Constipation persists           Medicare Wellness Exam    The patent is being seen for a follow up annual wellness visit  Past Medical, Surgical and family History: Reviewed and updated in chart  Interval History: Patient has not been hospitalized previously  Medications and Supplements: Review of all medications by a prescribing practitioner or clinical pharmacist (such as prescriptions, OTC, Herbal therapies and supplements) documented in the medical record.    Patient Self-Assessment of health: Fair  Tobacco Use: No  Alcohol Use: No  Illicit drug use: No  Patient using opioids: No    Current Diet: in general, a \"healthy\" diet    Adequate fluid intake: No  Caffeine intake: Yes  Exercise frequency: not active    Depression/Suicide screening: PHQ2/ PHQ9 (see screenings tab)    Hearing impairment: Yes  Uses hearing aids N/A  Cognitive impairment Observation: Yes   Patient or family reported cognitive impairment: Yes    Bathing: independent and dependant  Dressing: independent and dependant  Walking: with partial assistance  Taking Medications: dependant  Feeding: independent  Personal Hygiene: independent  Managing Finances: dependant  Shopping: dependant  Housework/Basic Home Maintenance: dependant  Handling transportation: dependant  Preparing meals: dependant    Bowels: continent  Bladder: incontinent    Falls Risk: has notfallen in last 6 months.   Their fall has not resulted in an injury.   Fall risk Factors: Fall Risk Factors: Cognitive Impairment severe   Care " Plan Risk: Care Plan: High Risk: Regular physical activity such as walking, water aerobics or seth chi to improve strength, balance, coordination and flexibility. Wear appropriate, sensible shoe wear. Remove fall hazards at home such as loose rugs, obstacles, use non-slip surface in bath or shower. Keep living space well lit. Use assistive devices such as cane or walker if recommended and at home use handrails on stairs, grab bars for shower or tub, raised toilet seat and seat in the shower or tub.       Home Safety Risk Factors: Home Safety Risk Factors: None and Unfamiliar Surroundings  Advanced Directives:  Living will: Yes POA: Yes    Patient's End of Life Decisions: Provider agree to follow.      Past Medical History:   Diagnosis Date    Anemia     Arthritis     BPH with obstruction/lower urinary tract symptoms     Cerebral vascular accident (Multi)     1996, 2016 w/ residual left sided weakness, CT Head 9/8/22  Large area of encephalomalacia of the right MCA territory, unchanged since prior. 3. Advanced chronic microvascular ischemic changes and chronic left basal ganglia lacunar infarct.    CKD (chronic kidney disease)     Cognitive decline     Dementia (Multi)     on donzepril    Depression     Hearing aid worn     refuses to wear    HL (hearing loss)     Hypertension     OAB (overactive bladder)     PE (pulmonary thromboembolism) (Multi) 09/07/2018    off anticoags    Resting tremor     mild hands    RLS (restless legs syndrome)     Spinal stenosis     Unspecified urinary incontinence         Review of Systems     Constitutional: no fever, no chills, not feeling poorly, not feeling tired and no recent weight gain, no recent weight loss.   ENT: no earache, no hearing loss, no nosebleeds, no nasal discharge, no sore throat and no hoarseness.   Cardiovascular: the heart rate was not slow, the heart rate was not fast, no chest pain, no palpitations, no intermittent leg claudication and no lower extremity edema.    Respiratory: no cough, wheezing or shortness of breath at rest or exertion  Gastrointestinal: no abdominal pain, no constipation, no melena, no nausea, no diarrhea, no vomiting and no blood in stools.   Musculoskeletal: no arthralgias, no myalgias, no back pain, no joint swelling, no joint stiffness, no limb pain and no limb swelling.   Integumentary: no skin rashes, no skin lesions, no itching, no skin wound and no dry skin.   Neurological: no headache, no confusion, no numbness, no dizziness, no tingling and no fainting.   All other systems have been reviewed and are negative for complaint.          Physical Exam     Constitutional   General appearance: Alert and in no acute distress.     Pulmonary   Respiratory assessment: No respiratory distress, normal respiratory rhythm and effort.    Auscultation of Lungs: Clear bilateral breath sounds.   Cardiovascular   Auscultation of heart: Apical pulse normal, heart rate and rhythm normal, normal S1 and S2, no murmurs and no pericardial rub.    Exam for edema: No peripheral edema.   Abdomen   Abdominal Exam: No bruits, normal bowel sounds, soft, non-tender, no abdominal mass palpated.    Liver and Spleen exam: No hepato-splenomegaly.   Musculoskeletal   Examination of gait: Normal.    Inspection of digits and nails: No clubbing or cyanosis of the fingernails.    Inspection/palpation of joints, bones and muscles: No joint swelling. Normal movement of all extremities.   Skin   Skin inspection: Normal skin color and pigmentation, normal skin turgor and no visible rash.   Neurologic   Cranial nerves: Nerves 2-12 were intact, no focal neuro defects.          Assessment/Plan            Patient with a past medical history of HTN, HLD, BPH with LUTS, CKD III. Lumbar Stenosis, Pulmonary Embolism, Vascular Dementia, OAB, Incontinence        # HTN  Stable  Continue current medications          # CKD IIIb  stable     # HLD  condition is stable  continue current medications     #  Vascular Dementia  Gait has gotten smaller  Flat affect  Neurology consulted, pending in November  Recommend to use hearing aides everyday     # BPH/ Nocturia/ Scrotal swelling  S/p Hydrocele surgery  Urology managing    # OA of Knees  Gets stiff in AM        # RLS  No help from [facility]  So will DC      Depression screening 15 min    CBC/ CMP

## 2024-08-29 ENCOUNTER — OFFICE VISIT (OUTPATIENT)
Dept: UROLOGY | Facility: HOSPITAL | Age: 84
End: 2024-08-29
Payer: MEDICARE

## 2024-08-29 VITALS — HEIGHT: 71 IN | WEIGHT: 165 LBS | BODY MASS INDEX: 23.1 KG/M2

## 2024-08-29 DIAGNOSIS — N32.81 OAB (OVERACTIVE BLADDER): Primary | ICD-10-CM

## 2024-08-29 DIAGNOSIS — R35.0 URINARY FREQUENCY: ICD-10-CM

## 2024-08-29 DIAGNOSIS — N39.46 MIXED STRESS AND URGE URINARY INCONTINENCE: ICD-10-CM

## 2024-08-29 PROCEDURE — 1126F AMNT PAIN NOTED NONE PRSNT: CPT | Performed by: UROLOGY

## 2024-08-29 PROCEDURE — 99213 OFFICE O/P EST LOW 20 MIN: CPT | Performed by: UROLOGY

## 2024-08-29 PROCEDURE — 1036F TOBACCO NON-USER: CPT | Performed by: UROLOGY

## 2024-08-29 PROCEDURE — 1157F ADVNC CARE PLAN IN RCRD: CPT | Performed by: UROLOGY

## 2024-08-29 ASSESSMENT — PAIN SCALES - GENERAL: PAINLEVEL: 0-NO PAIN

## 2024-08-29 NOTE — PROGRESS NOTES
FUV    Last seen 5/28/24    HISTORY OF PRESENT ILLNESS:   Marlo Carl is a 84 y.o. male who is being seen today for fuv    H/o BPH w/ LUTS, OAB symptoms and UUI.  Has failed bladder botox (2019) and myrbetriq 25 mg for his UUI.   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   Has tried condom catheterization in the past, but it would not stay on.  Has tried ISC in the past, however only produced low volumes thus discontinued.  Continues to leak, worse at night, wears precautionary pads for this.      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  PSH botox, hyrocelectomy  FH of cancer, unspecified  Former smoker (quit in [id number])    AUA SS 26, nocturia 5x, QOL 5  Not sexually active    Had [facility] PNE in office on 3/8/24.  Had one good day then leads accidentally got dislodged.  Removed in office on 3/11/24. They do think that it helped.  Was wetting less at night.      Had advanced trial on 4/17/24. Lead placed on right with excellent motor responses.      4/19/24 - Has been doing well.  Staying dry overnight.  Voiding on his own.  Ok urinary stream.  >50% improvement    5/1/24 - Stage 2 in OR    5/28/24 - Doing well, symptoms improved    8/29/24 - Doing well, symptoms stable    PAST MEDICAL HISTORY:  Past Medical History:   Diagnosis Date    Anemia     Arthritis     BPH with obstruction/lower urinary tract symptoms     Cerebral vascular accident (Multi)     1996, 2016 w/ residual left sided weakness, CT Head 9/8/22  Large area of encephalomalacia of the right MCA territory, unchanged since prior. 3. Advanced chronic microvascular ischemic changes and chronic left basal ganglia lacunar infarct.    CKD (chronic kidney disease)     Cognitive decline     Dementia (Multi)     on donzepril    Depression     Hearing aid worn     refuses to wear    HL (hearing loss)     Hypertension     OAB (overactive bladder)     PE (pulmonary thromboembolism) (Multi) 09/07/2018    off  anticoags    Resting tremor     mild hands    RLS (restless legs syndrome)     Spinal stenosis     Unspecified urinary incontinence        PAST SURGICAL HISTORY:  Past Surgical History:   Procedure Laterality Date    CARPAL TUNNEL RELEASE Bilateral     CATARACT EXTRACTION Bilateral 2006    EXCISION / REPAIR HYDROCELE PEDIATRIC Left 12/2022    TRANSURETHRAL RESECTION OF PROSTATE  2016     ALLERGIES:   No Known Allergies     MEDICATIONS:   Current Outpatient Medications   Medication Instructions    aspirin 81 mg EC tablet 1 tablet, oral, Daily    atorvastatin (Lipitor) 20 mg tablet 1 tablet, oral, Daily    chlorhexidine (Peridex) 0.12 % solution 15 ml swish and spit for 30 seconds night prior to surgery and morning of surgery    cranberry fruit extract (CRANBERRY CONCENTRATE ORAL) 30,000 mg, oral, Daily    diphenhydrAMINE-acetaminophen (Tylenol PM)  mg per tablet 1 tablet, oral, Nightly PRN    docusate sodium (COLACE) 100 mg, oral, Daily    donepezil (ARICEPT) 10 mg, oral, Nightly    krill oil 500 mg capsule oral    melatonin 5 mg capsule 1 capsule, oral, Nightly PRN    mirabegron (MYRBETRIQ) 50 mg, oral, Daily    multivit-minerals/folic acid (CENTRUM ADULTS ORAL) 1 tablet, oral, Daily    NIFEdipine ER (ADALAT CC) 90 mg, oral, Daily    oxyCODONE (ROXICODONE) 5 mg, oral, Every 6 hours PRN      Labs  Lab Results   Component Value Date    PSA 2.44 03/12/2022     Lab Results   Component Value Date    GFRMALE 39 (A) 10/15/2022     Lab Results   Component Value Date    CREATININE 1.96 (H) 04/04/2024     Lab Results   Component Value Date    CHOL 132 10/25/2023     Lab Results   Component Value Date    HDL 49 (A) 10/25/2023     Lab Results   Component Value Date    CHHDL 2.7 10/25/2023     Lab Results   Component Value Date    TRIG 135 10/25/2023     Lab Results   Component Value Date    HCT 41.8 04/04/2024       Assessment:      1. OAB (overactive bladder)        2. Urinary frequency        3. Mixed stress and urge  urinary incontinence          Marlo Carl is a 84 y.o. male here for FUV     Plan:   Doing ok  Cont current program    OAB    We discussed the pathophysiology of overactive bladder. We discussed possible treatment options including doing conservative voiding techniques, medications, and surgical options.. He was counseled regarding bladder retraining, diet choices, and fluid restriction.  A handout about this given as well as a voiding diary    Patient was informed that first line treatment is behavioral therapy.  This includes:   -Fluid balancing and sometimes restriction   -Reducing caffeine or other bladder stimulants   -Bladder retraining  -Delayed voiding to help defer the overwhelming urge    Patient was informed that second line treatment includes medications. We discussed Mirabegron and that the side effects include possible increase in blood pressure in a small minority of patients, however insurance does not always cover this. We also discussed anticholinergic medications which can have the side effects of dry eyes, dry mouth, constipation and rarely cognitive changes.    I mentioned 3rd line management options of neuromodulation and Botox injections as well

## 2024-10-03 ENCOUNTER — APPOINTMENT (OUTPATIENT)
Dept: PRIMARY CARE | Facility: CLINIC | Age: 84
End: 2024-10-03
Payer: MEDICARE

## 2024-10-04 DIAGNOSIS — E78.49 OTHER HYPERLIPIDEMIA: ICD-10-CM

## 2024-10-04 DIAGNOSIS — I10 PRIMARY HYPERTENSION: ICD-10-CM

## 2024-10-04 NOTE — TELEPHONE ENCOUNTER
Rx Refill Request Telephone Encounter    Name:  Marlo Siddhartha  :  [id number]  Specific Pharmacy location:  [facility]

## 2024-10-05 RX ORDER — NIFEDIPINE 90 MG/1
90 TABLET, EXTENDED RELEASE ORAL DAILY
Qty: 90 TABLET | Refills: 2 | Status: SHIPPED | OUTPATIENT
Start: 2024-10-05 | End: 2025-07-02

## 2024-10-05 RX ORDER — ATORVASTATIN CALCIUM 20 MG/1
20 TABLET, FILM COATED ORAL DAILY
Qty: 90 TABLET | Refills: 0 | Status: SHIPPED | OUTPATIENT
Start: 2024-10-05 | End: 2025-01-03

## 2024-11-06 ENCOUNTER — APPOINTMENT (OUTPATIENT)
Dept: GERIATRIC MEDICINE | Facility: CLINIC | Age: 84
End: 2024-11-06
Payer: MEDICARE

## 2024-11-06 ENCOUNTER — OFFICE VISIT (OUTPATIENT)
Dept: NEUROLOGY | Facility: CLINIC | Age: 84
End: 2024-11-06
Payer: MEDICARE

## 2024-11-06 VITALS
HEART RATE: 72 BPM | BODY MASS INDEX: 23.47 KG/M2 | TEMPERATURE: 97.3 F | DIASTOLIC BLOOD PRESSURE: 80 MMHG | RESPIRATION RATE: 16 BRPM | SYSTOLIC BLOOD PRESSURE: 174 MMHG | WEIGHT: 168.3 LBS

## 2024-11-06 DIAGNOSIS — G20.C PARKINSONISM, UNSPECIFIED PARKINSONISM TYPE (MULTI): ICD-10-CM

## 2024-11-06 DIAGNOSIS — F01.A0 MILD VASCULAR DEMENTIA WITHOUT BEHAVIORAL DISTURBANCE, PSYCHOTIC DISTURBANCE, MOOD DISTURBANCE, OR ANXIETY: Primary | ICD-10-CM

## 2024-11-06 PROCEDURE — 3079F DIAST BP 80-89 MM HG: CPT | Performed by: PSYCHIATRY & NEUROLOGY

## 2024-11-06 PROCEDURE — 1160F RVW MEDS BY RX/DR IN RCRD: CPT | Performed by: PSYCHIATRY & NEUROLOGY

## 2024-11-06 PROCEDURE — 1126F AMNT PAIN NOTED NONE PRSNT: CPT | Performed by: PSYCHIATRY & NEUROLOGY

## 2024-11-06 PROCEDURE — 3077F SYST BP >= 140 MM HG: CPT | Performed by: PSYCHIATRY & NEUROLOGY

## 2024-11-06 PROCEDURE — 1157F ADVNC CARE PLAN IN RCRD: CPT | Performed by: PSYCHIATRY & NEUROLOGY

## 2024-11-06 PROCEDURE — 1170F FXNL STATUS ASSESSED: CPT | Performed by: PSYCHIATRY & NEUROLOGY

## 2024-11-06 PROCEDURE — 99205 OFFICE O/P NEW HI 60 MIN: CPT | Performed by: PSYCHIATRY & NEUROLOGY

## 2024-11-06 PROCEDURE — 1159F MED LIST DOCD IN RCRD: CPT | Performed by: PSYCHIATRY & NEUROLOGY

## 2024-11-06 PROCEDURE — 99215 OFFICE O/P EST HI 40 MIN: CPT | Mod: GC | Performed by: PSYCHIATRY & NEUROLOGY

## 2024-11-06 RX ORDER — CARBIDOPA AND LEVODOPA 25; 100 MG/1; MG/1
1 TABLET ORAL 3 TIMES DAILY
Qty: 90 TABLET | Refills: 2 | Status: SHIPPED | OUTPATIENT
Start: 2024-12-06 | End: 2025-03-06

## 2024-11-06 RX ORDER — CARBIDOPA AND LEVODOPA 25; 100 MG/1; MG/1
TABLET ORAL
Qty: 90 TABLET | Refills: 0 | Status: SHIPPED | OUTPATIENT
Start: 2024-11-06

## 2024-11-06 ASSESSMENT — GERIATRIC DEPRESSION SCALE SHORT VERSION (GDS-SV)
DO YOU OFTEN GET BORED: NO
DO YOU FEEL YOU HAVE MORE PROBLEMS WITH MEMORY THAN MOST: YES
ARE YOU BASICALLY SATISFIED WITH YOUR LIFE: YES
DO YOU FEEL THAT YOUR SITUATION IS HOPELESS: NO
DO YOU PREFER TO STAY AT HOME, RATHER THAN GOING OUT AND DOING NEW THINGS: NO
DO YOU FEEL PRETTY WORTHLESS THE WAY YOU ARE NOW: NO
DO YOU OFTEN FEEL HELPLESS: NO
DO YOU FEEL FULL OF ENERGY: NO
DO YOU FEEL HAPPY MOST OF THE TIME: NO
ARE YOU IN GOOD SPIRITS MOST OF THE TIME: YES
DO YOU THINK THAT MOST PEOPLE ARE BETTER OFF THAN YOU ARE: NO
DO YOU THINK IT IS WONDERFUL TO BE ALIVE NOW: YES
HAVE YOU DROPPED MANY OF YOUR ACTIVITIES AND INTERESTS?: YES
DO YOU FEEL THAT YOUR LIFE IS EMPTY: NO
GDS TOTAL SCORE: 4
ARE YOU AFRAID THAT SOMETHING BAD IS GOING TO HAPPEN TO YOU: NO

## 2024-11-06 ASSESSMENT — ACTIVITIES OF DAILY LIVING (ADL)
NEEDS_ASSISTANCE_WITH_FOOD: SET UP OF PLATE
ADEQUATE_TO_COMPLETE_ADL: YES
PREPARING_MEALS: TOTAL CARE
TOILETING: NEEDS ASSISTANCE
FEEDING YOURSELF: NEEDS ASSISTANCE
HEARING - LEFT EAR: HEARING AID
HEARING - RIGHT EAR: HEARING AID
DRESSING YOURSELF: NEEDS ASSISTANCE
EATING: NEEDS ASSISTANCE
GROOMING: NEEDS ASSISTANCE
USING_TRANSPORTATION: NEEDS ASSISTANCE
DOING_HOUSEWORK: TOTAL CARE
GROCERY_SHOPPING: TOTAL CARE
TAKING_MEDICATION: TOTAL CARE
BATHING: NEEDS ASSISTANCE
MANAGING_FINANCES: TOTAL CARE
JUDGMENT_ADEQUATE_SAFELY_COMPLETE_DAILY_ACTIVITIES: YES
USING_TELEPHONE: NEEDS ASSISTANCE
WALKS IN HOME: NEEDS ASSISTANCE
STILL_DRIVING: NO
PATIENT'S MEMORY ADEQUATE TO SAFELY COMPLETE DAILY ACTIVITIES?: YES
PILL_BOX_USED: YES

## 2024-11-06 ASSESSMENT — MONTREAL COGNITIVE ASSESSMENT (MOCA)
11. FOR EACH PAIR OF WORDS, WHAT CATEGORY DO THEY BELONG TO (OUT OF 2): 0
9. REPEAT EACH SENTENCE: 0
4. NAME EACH OF THE THREE ANIMALS SHOWN: 3
10. [FLUENCY] NAME WORDS STARTING WITH DESIGNATED LETTER: 1
7. [VIGILENCE] TAP WHEN HEARING DESIGNATED LETTER: 0
WHAT IS THE TOTAL SCORE (OUT OF 30): 8
6. READ LIST OF DIGITS [FORWARD/BACKWARD]: 1
12. MEMORY INDEX SCORE: 0
13. ORIENTATION SUBSCORE: 3
VISUOSPATIAL/EXECUTIVE SUBSCORE: 0
WHAT LEVEL OF EDUCATION WAS ATTAINED: 0
5. MEMORY TRIALS: 0
8. SERIAL SUBTRACTION OF 7S: 0

## 2024-11-06 ASSESSMENT — PATIENT HEALTH QUESTIONNAIRE - PHQ9
1. LITTLE INTEREST OR PLEASURE IN DOING THINGS: NOT AT ALL
2. FEELING DOWN, DEPRESSED OR HOPELESS: NOT AT ALL
SUM OF ALL RESPONSES TO PHQ9 QUESTIONS 1 AND 2: 0

## 2024-11-06 ASSESSMENT — ENCOUNTER SYMPTOMS
OCCASIONAL FEELINGS OF UNSTEADINESS: 1
LOSS OF SENSATION IN FEET: 1

## 2024-11-06 ASSESSMENT — PAIN SCALES - GENERAL: PAINLEVEL_OUTOF10: 0-NO PAIN

## 2024-11-06 NOTE — PROGRESS NOTES
Start Time:3:18 pm  Stop Time:4:15 pm     Chart reviewed, including physician notes and diagnostic studies, for 4 minutes prior to this appointment.      Accompanied by: daughter Nataliia    Formulation: Mr. Carl is a very-pleasant 84 y.o. 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  He was last seen 4.17.2019. He resides with his wife (who also has significant vascular dementia) and his two daughters who are caretakers. He is dependent on daughters for all ADL's and most IADL's. His daugther helps provide history  today and repots overall stability in his cognition over the past several years, with mild decline in short term memory and executive function. MoCA today is 8 from 11 in 2019. His daughter endorses increasing difficulty with ambulation with worsening shuffling gait and tremors. She also reports episodes of staring during which he is less verbally responsive that can last up to two minutes which have increased in frequency (~1 per day). These are not associated with other abnormal movements.     Regarding staring episodes, given his history of CVA he is at increased risk for seizures, so we will order EEG to assess for epileptiform activity. These could also represent fluctuating cognition in the setting LBD, though his clinical presentation is less consistent with this.    Exam is consistent with parkinsonism which has increased since his last visit. Could be secondary to vascular disease or neurodegeneration. After discussing risks benefits and alternatives, he will trial sinemet to target this. We will also reorder PT.     Diagnosis:  Vascular dementia  Cerebrovascular disease (right frontoparietal stroke and L basal ganglia lacune)  Parkinsonism    Plan:  -Start Sinemet  " mg as follows:  Start with half a tablet twice a day for one week.   Then increase to half a tablet three times a day for one week.   Then increase to 0.5 tablet in the morning, 0.5 tablet at lunch and a full tablet at bedtime for one week.   Then increase to 0.5 tablet in the morning, a full tablet at lunch, and a full tablet at bedtime for one week.   Then increase to a full tablet three times daily.  -Please message Dr. Dowd on MyChart one week after increasing to a full tablet three times daily.   - EEG to rule out seizure like activity  - Refer to physical therapy     ----------------------------------------------------------------------------------------------------------------------------------------------------    History of Present Illness:  The patient was last seen by Neetu Fields CNP in 2020, at which point no changes were made.  I last the patient in 2019.    From his perspective he is doing well. He admits memory is worse. Lives with wife, two daughters and granddaughters. He likes sports, favorite team is womens [employer] basketball [employer] from [place]. He is happy to be alive. He looks forward to getting up, eating. Enjoys his family.     Nataliia provides history. He is more off balance recently. He has been ambulating with a rollator and wheelchair. He is more sedentary. She feels that this is setting him back. He goes to bed at 10:30, often awakens several times during the night to urinate. He can make phone calls and use a remote.     He has trouble hearing. He lost his hearing aids.     Nataliia reports that he has episodes where he \"just stops.\" These episodes can last a few minutes and are happening with increasing frequency, now nearly one per day. He is minimally verbally responsive during these episodes. There are no associated abnormal movements.     No history of dream enactment behaviors    He is having incontinence of urine, rarely of stool.     His wife has vascular dementia. "     Daughters manage finances and meal preparation. He is not driving. There is one firearm in the home but it is not loaded and he cannot access it (it is locked away). He needs assistance showering and cleaning his teeth. If they cut his food, he can use utensils and feed himself. He can mostly dress himself.     No tobacco use. Will have one beer once a week. No other substance use.     Folate- 13 in 2019  TSH-1.61 in 2024  B12- 974 in 2019    CTH 2022- showed no acute changes but extensive encephalomalacia in R MCA territory and L basal ganglia lacunar infarct    MRI brain- reviewed from 2019  IMPRESSION:  *There is no evidence of hemohrage, mass lesion or acute infarction.  *Remote right hemispheric infarction  *Volume loss with extensive demyelination consistent with small  vessel ischemic change.  *THIS EXAMINATION WAS INTERPRETED AT       No Known Allergies      Current Outpatient Medications:     atorvastatin (Lipitor) 20 mg tablet, Take 1 tablet (20 mg) by mouth once daily., Disp: 90 tablet, Rfl: 0    docusate sodium (Colace) 100 mg capsule, Take 1 capsule (100 mg) by mouth once daily., Disp: , Rfl:     krill oil 500 mg capsule, Take by mouth., Disp: , Rfl:     melatonin 5 mg capsule, Take 1 capsule (5 mg) by mouth as needed at bedtime., Disp: , Rfl:     mirabegron (Myrbetriq) 50 mg tablet extended release 24 hr 24 hr tablet, Take 1 tablet (50 mg) by mouth once daily., Disp: 30 tablet, Rfl: 11    multivit-minerals/folic acid (CENTRUM ADULTS ORAL), Take 1 tablet by mouth once daily., Disp: , Rfl:     NIFEdipine CC 90 mg 24 hr tablet, Take 1 tablet (90 mg) by mouth once daily., Disp: 90 tablet, Rfl: 2    aspirin 81 mg EC tablet, Take 1 tablet (81 mg) by mouth once daily. (Patient not taking: Reported on 11/6/2024), Disp: , Rfl:     carbidopa-levodopa (Sinemet)  mg tablet, Start with half a tablet twice a day for one week. Then increase to half a tablet three times a day for one week. Then increase to  0.5 tablet in the morning, 0.5 tablet at lunch and a full tablet at bedtime for one week. Then increase to 0.5 tablet in the morning, a full tablet at lunch, and a full tablet at bedtime for one week. Then increase to a full tablet three times daily., Disp: 90 tablet, Rfl: 0    [START ON 12/6/2024] carbidopa-levodopa (Sinemet)  mg tablet, Take 1 tablet by mouth 3 times a day. Do not fill before December 6, 2024., Disp: 90 tablet, Rfl: 2    oxyCODONE (Roxicodone) 5 mg immediate release tablet, Take 1 tablet (5 mg) by mouth every 6 hours if needed for severe pain (7 - 10) for up to 8 doses., Disp: 8 tablet, Rfl: 0    Review of Systems  As per HPI, otherwise all other systems have been reviewed are negative for complaint.      Objective   /80   Pulse 72   Temp 36.3 °C (97.3 °F) (Tympanic)   Resp 16   Wt 76.3 kg (168 lb 4.8 oz)   BMI 23.47 kg/m²     EXAMINATION  Mental Status Examination  General appearance: well kept, fair eye contact, cooperative  Orientation: alert and oriented 3/6 per MoCA   Motor: psychomotor retardation, gait is parkinsonian  Speech: soft  Mood: euthymic  Affect: Congruent with mood and topic of conversation  Passive death wish: No  Suicidal ideation: No  Thought process: logical, linear, and goal-directed  Thought content: Hallucinations:no, Delusions: none, denied; and not responding to internal stimuli  Insight/Judgment: Fair/Fair  Fund of knowledge: Fair  Recent and remote memory: Poor/Fair  Attention span and concentration: Fair  Language: fluent      MoCA  Visuospatial/Executive: 0  Naming: 3  Memory (Score '0' as this is an Unscored Section): 0  Attention: Read List of Digits: 1  Attention: Read List of Letters: 0  Attention: Serial Sevens: 0  Language: Repeat: 0  Language: Fluency: 1 (1 word)  Abstraction: 0  Delayed Recall: 0 (MIS = 2)  Orientation: 3  Add 1 Point if </=12 yr Education: 0 (pt said he dropped out of first year of college)  MOCA Total Score: 8      MoCA  (4/2019): 11/30      Head turning sign: positive    NEUROLOGICAL EXAMINATION  II:  normal findings  III, IV, VI:  all extraocular movements were intact  V: facial sensation was normal and symmetrical   VII: eye closure was normal bliaterally  VIII: hearing limited, not wearing hearing aids today  IX, X: gag reflex intact bilaterally  XI: neck with full ROM  XII:  tongue protrusion was midline, no fasciculations noted    Motor:  Muscle bulk was abnormal mildly decreased  muscle bulk was diminished and decreased strength in BL LE hip flexion, L>R   Finger taps and hand opening:  bradykinetic with pauses and dysrhythmia  Toe and heel taps: abnormal bradykinetic with dysrthythmia  Muscular tone:  cogwheeling on L side  Movements:  rest tremor present in BL UE     Reflexes:  2+ bilaterally           Vasquez's reflex: within normal limits  Frontal release signs: glabellar reflex    Coordination:  finger to nose with tremor bilaterally .    Gait:  Able to stand from seated position with arms across chest: abnormal unable  Shuffling, parkinsonian, stooped posture      Signed;  Kasey Clinton MD  PGY-5 Behavioral Neurology and Neuropsychiatry  11/07/24 8:16 AM    I saw and evaluated the patient. I personally obtained the key and critical portions of the history and physical exam or was physically present for key and critical portions performed by the resident/fellow. I reviewed the resident/fellow's documentation and discussed the patient with the resident/fellow. I agree with the resident/fellow's medical decision making as documented in the note.    Yaw Dowd MD

## 2024-11-06 NOTE — PATIENT INSTRUCTIONS
Start Sinemet  mg as follows:  Start with half a tablet twice a day for one week.   Then increase to half a tablet three times a day for one week.   Then increase to 0.5 tablet in the morning, 0.5 tablet at lunch and a full tablet at bedtime for one week.   Then increase to 0.5 tablet in the morning, a full tablet at lunch, and a full tablet at bedtime for one week.   Then increase to a full tablet three times daily.    Please message Dr. Dowd on MyChart one week after increasing to a full tablet three times daily.     We ordered an EEG to rule out seizure like activity    We have ordered physical therapy     Followup with us in 6 months

## 2024-11-06 NOTE — PROGRESS NOTES
Patient ID: Marlo Carl is a 84 y.o. male who presents for follow up for memory loss.    Identifying Information                              : 1940           Assessment date: 2024    Patient accompanied by:  daughter, Nataliia         History provided by: Nataliia and patient    Symptoms Reported (include length of time): short term memory loss over the past year, worsening over past 6 months    CONCERNS IDENTIFIED BY NURSING (Safety Risks, Health Issues, etc)     1. Weakness of legs, unsteady on Rolator     Daily Functioning Assessment    ADL Screening  Patient's Vision Adequate to Safely Complete Daily Activities: Yes  Patient's Judgment Adequate to Safely Complete Daily Activities: Yes  Patient's Memory Adequate to Safely Complete Daily Activities: Yes  Patient Able to Express Needs/Desires: Yes  Which is your dominant hand?: Right  Dressing: Needs assistance  Grooming: Needs assistance  Feeding: Needs assistance  Bathing: Needs assistance  Toileting: Needs assistance  In/Out Bed: Needs assistance  Walks in Home: Needs assistance  Weakness of Legs: Both  Weakness of Arms/Hands: Both  Hearing - Right Ear: Hearing aid  Hearing - Left Ear: Hearing aid     IADL's  Using Telephone: Needs assistance (R mariel stroke)  Grocery Shopping: Total care  Preparing Meals: Total care  Doing Housework: Total care  Laundry: Total  Taking Medication: Total care  Pill Box Used: Yes  Managing Finances: Total care  Using Transportation: Needs assistance  Still Driving: No  Eating: Needs assistance  Needs Assistance With Food: Set up of plate  Difficulty Chewing or Swallowing: Yes (Comment) (Per daughter missing teeth)     Safety Concerns  Safety Concerns: Weapons  Safety Concerns Notes:: multiple guns  unloaded and locked away      Safety  Nutrition and Exercise  Current Diet: Well Balanced Diet  Adequate Fluid Intake: Yes  Caffeine: Yes (coffee and tea multiple times a week)  Appetite:: Good  Food Consistency::  Regular  Liquids Consistency:: Thin  Changes in Weight?: No  Chewing or Swallowing Problems?: Yes (missing teeth)  Exercise Frequency: No Exercise      Additional Nursing Notes or Follow-up Matters: NA

## 2024-11-06 NOTE — PROGRESS NOTES
"Subjective   Patient ID: Marlo Carl is a 84 y.o. male who presents for cognitive impairment. He is becoming more forgetful, wants to sleep most of the time, is losing mobility and dexterity, is slower in conversation, is incontinent (daughter said he \"does not feel wetness of urine\"), refuses to wear hearing aides.    Identifying Information                              : 1940  Assessment date: 2024    Objective     Patient accompanied by:  daughter Nataliia    History provided by: daughter Nataliia    Symptoms Reported (include length of time): becoming more forgetful, increased sleepiness, losing mobility and dexterity, slower in conversation, incontinence, has trouble walking and bathing and dressing    CONCERNS IDENTIFIED BY NURSING (Safety Risks, Health Issues, etc)     1. Daughters are caring for both parents who both have dementia; at risk for burnout  2. Daughter reported finances were a stressor      Social History   Social History     Socioeconomic History    Marital status:    Tobacco Use    Smoking status: Former     Current packs/day: 0.00     Types: Cigarettes     Quit date: 10/1/1992     Years since quittin.1    Smokeless tobacco: Never   Vaping Use    Vaping status: Never Used   Substance and Sexual Activity    Alcohol use: Yes     Alcohol/week: 1.0 standard drink of alcohol     Types: 1 Cans of beer per week     Comment: occassional beer    Drug use: Not Currently        Advance Directives  Do you have a DPOA for healthcare?  yes   If applicable name of healthcare DPOA: Surekha Carl, daughter, appointed 10/2020  Do you have a DPOA for finances?  yes    If applicable name of financial DPOA: Surekha Carl  Do you have a legal guardian?  no  If applicable name of legal guardian:    Financial Considerations  Are you on any form of disability? no Type:  Are you a ? no  Significant financial stressors: yes; daughters take care of it all    Family History   Mother living? no "  If not, age and cause: Diabetes in her 60s  Father living? no If not, age and cause:  from black lung in 80s  Extended family members with relevant health history:   3rd of five children. Patient is the last one alive as both younger brothers  of cancer last year. All four other siblings had cancer.     Living Situation   Type of residence: Own home #floors: 2 floors plus a basement and attic  People living in the home: 2 daughters (Surekha and Nataliia), 1 granddaughter  Do you feel safe living in your home? yes   Identified safety concerns: none reported   Been living in home for 50 years  Daughters work hybrid a few days a week and take turns juggling in-home care with in-person and hybrid work with employers.    Supportive Relationships (Informal Support)  Children Information: Daughters Surekha and Nataliia live with him and take care of all ADLs and iADLs  Other supports: none  Are any family members supporting ADLs/IADLs (describe): Nataliia and Surekha.  Wife also has dementia,  62 years  Patient was part of a [employer] and used to be active, but attending has been difficult because of dementia symptoms so he has not been recently and [employer] has not been visiting.    Formal Supports  Are any community services engaged (Emergency Alert, HHA, MOW, Case Management, Etc.): Patient has alert system with watches but doesn't keep it on.    provided info for [facility] caregiver support and talked about [facility] program and adult day services. Patient's daughter was concerned about cost. She has looked into [employer]/ [employer] for adult day.    Mental Health  Sleep Routine & Issues: Patient goes to bed at 10pm, gets up 1-2x at night to pee, has a hard time waking in the morning and sometimes sleeps late into the day.  Energy: Low energy; watches TV in recliner.  Observed Mood: Sometimes feels good, sometimes doesn't.   Observed Affect: flat (this was bothersome to daughter  who was present)  Mood Concerns Noted by Patient/Family: had depression in 1996 when stroke happened and couldn't work anymore, but none recent  Loss/Grief: lost two brothers to cancer last year  Current mental health diagnosis/treatment (include medications): none  Self-harm/suicidal thoughts, plan: none  History of outpatient psychiatric services: none  History of inpatient psychiatric services (hospitalization): none  Interests/Hobbies/Activities/Daily Routine: football, basketball, eating out at [employer]     Patient worked for [employer] for 33 years and loved it; retired after stroke in 1996. He dropped out of his first year of college. He goes to a foot doctor every 2 weeks.      Assessment/Plan   Impression:  Patient is pleasant and well cared for. He was quiet and had a flat affect. His attention span was short but he was easy to talk to. As a , my main concern was with financial stressors and the burden placed upon his caregivers, who are also taking care of his wife while juggling full-time work.     Plan: Provided patient's daughter Nataliia with info to [facility] caregiver support program. Doctor to address concerns about affect and cognition.

## 2024-11-19 ENCOUNTER — TELEPHONE (OUTPATIENT)
Dept: NEUROLOGY | Facility: HOSPITAL | Age: 84
End: 2024-11-19
Payer: MEDICARE

## 2024-11-21 ENCOUNTER — APPOINTMENT (OUTPATIENT)
Dept: PRIMARY CARE | Facility: CLINIC | Age: 84
End: 2024-11-21
Payer: MEDICARE

## 2024-11-21 VITALS
WEIGHT: 164.2 LBS | TEMPERATURE: 98.2 F | BODY MASS INDEX: 22.9 KG/M2 | DIASTOLIC BLOOD PRESSURE: 70 MMHG | HEART RATE: 68 BPM | SYSTOLIC BLOOD PRESSURE: 130 MMHG | RESPIRATION RATE: 16 BRPM

## 2024-11-21 DIAGNOSIS — I10 PRIMARY HYPERTENSION: Primary | ICD-10-CM

## 2024-11-21 DIAGNOSIS — G20.C PARKINSONISM, UNSPECIFIED PARKINSONISM TYPE (MULTI): ICD-10-CM

## 2024-11-21 DIAGNOSIS — Z23 FLU VACCINE NEED: ICD-10-CM

## 2024-11-21 DIAGNOSIS — E78.49 OTHER HYPERLIPIDEMIA: ICD-10-CM

## 2024-11-21 PROCEDURE — 1160F RVW MEDS BY RX/DR IN RCRD: CPT | Performed by: INTERNAL MEDICINE

## 2024-11-21 PROCEDURE — 1157F ADVNC CARE PLAN IN RCRD: CPT | Performed by: INTERNAL MEDICINE

## 2024-11-21 PROCEDURE — 90662 IIV NO PRSV INCREASED AG IM: CPT | Performed by: INTERNAL MEDICINE

## 2024-11-21 PROCEDURE — 3075F SYST BP GE 130 - 139MM HG: CPT | Performed by: INTERNAL MEDICINE

## 2024-11-21 PROCEDURE — 1159F MED LIST DOCD IN RCRD: CPT | Performed by: INTERNAL MEDICINE

## 2024-11-21 PROCEDURE — 99214 OFFICE O/P EST MOD 30 MIN: CPT | Performed by: INTERNAL MEDICINE

## 2024-11-21 PROCEDURE — G2211 COMPLEX E/M VISIT ADD ON: HCPCS | Performed by: INTERNAL MEDICINE

## 2024-11-21 PROCEDURE — G0008 ADMIN INFLUENZA VIRUS VAC: HCPCS | Performed by: INTERNAL MEDICINE

## 2024-11-21 PROCEDURE — 3078F DIAST BP <80 MM HG: CPT | Performed by: INTERNAL MEDICINE

## 2024-11-21 NOTE — PROGRESS NOTES
Marlo Carl is a 84 y.o. male   Patient with a past medical history of HTN, HLD, BPH with LUTS, CKD III. Lumbar Stenosis, Pulmonary Embolism, hx of CVA with Vascular Dementia, OAB, Incontinence, Parkinsonism    C/o pain in the left calf  Tylenol helps    Saw neurology and there are concerns about parkinsonism  Will be starting Sinemet    No chest pain/  SOB/ dizziness  BM OK  Energy level ok  Appetite OK    Nocturia x 2         Review of Systems     Constitutional: no fever, no chills, not feeling poorly, not feeling tired and no recent weight gain, no recent weight loss.   ENT: no earache, no hearing loss, no nosebleeds, no nasal discharge, no sore throat and no hoarseness.   Cardiovascular: the heart rate was not slow, the heart rate was not fast, no chest pain, no palpitations, no intermittent leg claudication and no lower extremity edema.   Respiratory: no cough, wheezing or shortness of breath at rest or exertion  Gastrointestinal: no abdominal pain, no constipation, no melena, no nausea, no diarrhea, no vomiting and no blood in stools.   Musculoskeletal: no arthralgias, no myalgias, no back pain, no joint swelling, no joint stiffness,   Integumentary: no skin rashes, no skin lesions, no itching, no skin wound and no dry skin.   Neurological: no headache, no confusion, no numbness, no dizziness, no tingling and no fainting.   All other systems have been reviewed and are negative for complaint.     Current Outpatient Medications   Medication Instructions    aspirin 81 mg EC tablet 1 tablet, Daily    atorvastatin (LIPITOR) 20 mg, oral, Daily    carbidopa-levodopa (Sinemet)  mg tablet Start with half a tablet twice a day for one week. Then increase to half a tablet three times a day for one week. Then increase to 0.5 tablet in the morning, 0.5 tablet at lunch and a full tablet at bedtime for one week. Then increase to 0.5 tablet in the morning, a full tablet at lunch, and a full tablet at bedtime for one  week. Then increase to a full tablet three times daily.    [START ON 12/6/2024] carbidopa-levodopa (Sinemet)  mg tablet 1 tablet, oral, 3 times daily    docusate sodium (COLACE) 100 mg, Daily    krill oil 500 mg capsule Take by mouth.    melatonin 5 mg capsule 1 capsule, Nightly PRN    mirabegron (MYRBETRIQ) 50 mg, oral, Daily    multivit-minerals/folic acid (CENTRUM ADULTS ORAL) 1 tablet, Daily    NIFEdipine ER (ADALAT CC) 90 mg, oral, Daily    oxyCODONE (ROXICODONE) 5 mg, oral, Every 6 hours PRN         Vitals:    11/21/24 1623   BP: 130/70   Pulse: 68   Resp: 16   Temp: 36.8 °C (98.2 °F)        Physical Exam     Constitutional   General appearance: Alert and in no acute distress.     Pulmonary   Respiratory assessment: No respiratory distress, normal respiratory rhythm and effort.    Auscultation of Lungs: Clear bilateral breath sounds.   Cardiovascular   Auscultation of heart: Apical pulse normal, heart rate and rhythm normal, normal S1 and S2, no murmurs and no pericardial rub.    Exam for edema: No peripheral edema.   Abdomen   Abdominal Exam: No bruits, normal bowel sounds, soft, non-tender, no abdominal mass palpated.    Liver and Spleen exam: No hepato-splenomegaly.   Musculoskeletal   Examination of gait: Normal.    Inspection of digits and nails: No clubbing or cyanosis of the fingernails.    Inspection/palpation of joints, bones and muscles: No joint swelling. Normal movement of all extremities.   Skin   Skin inspection: Normal skin color and pigmentation, normal skin turgor and no visible rash.   Neurologic   Cranial nerves: Nerves 2-12 were intact, no focal neuro defects.    Assessment/Plan          Patient with a past medical history of HTN, HLD, BPH with LUTS, CKD III. Lumbar Stenosis, Pulmonary Embolism, hx of CVA with Vascular Dementia, OAB, Incontinence, Parkinsonism        # HTN  Stable  Continue current medications           # CKD IIIb  stable     # HLD  condition is stable  continue  current medications     # Vascular Dementia/ Parkinsonism  Gait has gotten smaller  Flat affect  Neurology consulted, pending in November  Recommend to use hearing aides everyday     # BPH/ Nocturia/ Scrotal swelling  S/p Hydrocele surgery  Urology managing    # OA of Knees  Gets stiff in AM        Recommend RSV

## 2024-12-04 ENCOUNTER — EVALUATION (OUTPATIENT)
Dept: PHYSICAL THERAPY | Facility: CLINIC | Age: 84
End: 2024-12-04
Payer: MEDICARE

## 2024-12-04 DIAGNOSIS — R26.89 IMBALANCE: ICD-10-CM

## 2024-12-04 DIAGNOSIS — I63.9: ICD-10-CM

## 2024-12-04 DIAGNOSIS — R68.89 DECREASED STRENGTH, ENDURANCE, AND MOBILITY: Primary | ICD-10-CM

## 2024-12-04 DIAGNOSIS — R26.9 ABNORMALITY OF GAIT AND MOBILITY: ICD-10-CM

## 2024-12-04 DIAGNOSIS — R53.1 DECREASED STRENGTH, ENDURANCE, AND MOBILITY: Primary | ICD-10-CM

## 2024-12-04 DIAGNOSIS — Z74.09 DECREASED STRENGTH, ENDURANCE, AND MOBILITY: Primary | ICD-10-CM

## 2024-12-04 PROCEDURE — 97110 THERAPEUTIC EXERCISES: CPT | Mod: GP

## 2024-12-04 PROCEDURE — 97162 PT EVAL MOD COMPLEX 30 MIN: CPT | Mod: GP

## 2024-12-04 ASSESSMENT — BALANCE ASSESSMENTS
STANDING TO SITTING: ABLE TO STAND INDEPENDENTLY USING HANDS
STANDING UNSUPPORTED: ABLE TO STAND 2 MINUTES WITH SUPERVISION
TURN 360 DEGREES: NEEDS CLOSE SUPERVISION OR VERBAL CUEING
PLACE ALTERNATE FOOT ON STEP OR STOOL WHILE STANDING UNSUPPORTED: TURNS SIDEWAYS ONLY BUT MAINTAINS BALANCE
REACHING FORWARD WITH OUTSTRETCHED ARM WHILE STANDING: REACHES FORWARD BUT NEEDS SUPERVISION
STANDING UNSUPPORTED WITH FEET TOGETHER: NEEDS HELP TO ATTAIN POSITION BUT ABLE TO STAND 15 SECONDS FEET TOGETHER
LONG VERSION TOTAL SCORE (MAX 56): 30
STANDING UNSUPPORTED WITH EYES CLOSED: ABLE TO STAND 10 SECONDS WITH SUPERVISION
STANDING ON ONE LEG: UNABLE TO TRY NEEDS ASSIST TO PREVENT FALL
PLACE ALTERNATE FOOT ON STEP OR STOOL WHILE STANDING UNSUPPORTED: ABLE TO COMPLETE 4 STEPS WITHOUT AID WITH SUPERVISION
STANDING TO SITTING: CONTROLS DESCENT BY USING HANDS
PICK UP OBJECT FROM THE FLOOR FROM A STANDING POSITION: ABLE TO PICK UP SLIPPER BUT NEEDS SUPERVISION
SITTING WITH BACK UNSUPPORTED BUT FEET SUPPORTED ON FLOOR OR ON A STOOL: ABLE TO SIT SAFELY AND SECURELY FOR 2 MINUTES
TRANSFERS: ABLE TO TRANSFER WITH VERBAL CUEING AND/OR SUPERVISION
STANDING UNSUPPORTED ONE FOOT IN FRONT: ABLE TO TAKE SMALL STEP INDEPENDENTLY AND HOLD 30 SECONDS

## 2024-12-04 ASSESSMENT — ENCOUNTER SYMPTOMS
OCCASIONAL FEELINGS OF UNSTEADINESS: 1
LOSS OF SENSATION IN FEET: 1
DEPRESSION: 0

## 2024-12-04 ASSESSMENT — PAIN SCALES - GENERAL: PAINLEVEL_OUTOF10: 7

## 2024-12-04 ASSESSMENT — PAIN - FUNCTIONAL ASSESSMENT: PAIN_FUNCTIONAL_ASSESSMENT: 0-10

## 2024-12-04 NOTE — PROGRESS NOTES
Physical Therapy    Physical Therapy Evaluation and Treatment    Patient Name: Marlo Carl  MRN: [id number]  Today's Date: 12/4/2024  Time Calculation  Start Time: 1430  Stop Time: 1538  Time Calculation (min): 68 min  PT Evaluation Time Entry  PT Evaluation (Moderate) Time Entry: 48  PT Therapeutic Procedures Time Entry  Therapeutic Exercise Time Entry: 12  Gait Training Time Entry: 8    Visit Number: 1   Insurance: Payor: [facility] MEDICARE / Plan: UNITED [employer] MEDICARE / Product Type: *No Product type* /   Plan of Care: 12/4/24 - 3/4/25  Authorization: Pending  Primary Diagnosis: Decreased strength, endurance, and mobility       Assessment:   Marlo Carl  is a 84 y.o. old patient who participated in a physical therapy evaluation today due to referral for history of stroke and worsening gait. 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 Patient presents with kyphotic and stopped posture, decreased functional LE strength/power, decreased bilateral HS flexibility, static and dynamic balance deficits, h/o neuropathic pain, impaired coordination, motor control deficits, and decreased functional endurance. Due to these impairments, he has the following functional limitations and participation restrictions: [gait deviations, high fall risk, stair negotiation, transfers, performing household/recreational  activities, and performing some ADLS. Patient barriers to rehab include: his significant past medical history rigo vascular dementia with parkinsonism and h/o multiple strokes, as well as history of decreased motivation per daughter. Patient facilitators for rehab include: good social support from daughter's. Patient and Daughter were educated regarding PT recommendation for use of assistive device for all functional mobility as he is a high fall risk based on Rivera Balance Scale and STEADI. Patient would  benefit from skilled physical therapy services to improve above mentioned impairments, allow for independent and safe functional mobility, attain his therapy-related goals, and establish home program. The patient verbalized understanding and is in agreement with all goals and plan of care. Plan of care was developed with input and agreement by the patient.    Plan:   OP PT Plan  Treatment/Interventions: Education/ Instruction, Gait training, Manual therapy, Neuromuscular re-education, Orthosis fit/ training, Prosthetic management/ training, Self care/ home management, Therapeutic activities, Therapeutic exercises  PT Plan: Skilled PT  PT Frequency: Other (Comment) (1-2x/week)  Duration: 10-20 visits  Onset Date: 11/06/24  Certification Period Start Date: 12/04/24  Certification Period End Date: 03/04/25  Number of Treatments Authorized: pending  Rehab Potential: Fair (due to multiple co-morbidites rigo vascular dementia and h/o multiple strokes)  Plan of Care Agreement: Patient    Current Problem:  Problem List Items Addressed This Visit             ICD-10-CM    Vascular dementia F01.50    Abnormality of gait and mobility R26.9    Relevant Orders    Follow Up In Physical Therapy     Other Visit Diagnoses         Codes    Decreased strength, endurance, and mobility    -  Primary R53.1, Z74.09, R68.89    Relevant Orders    Follow Up In Physical Therapy    CVA (cerebrovascular accident) (Multi)     I63.9    Relevant Orders    Follow Up In Physical Therapy          HPI per 11/6/24 note by Kasey Clinton MD:  Mr. Carl is a very-pleasant 84 y.o. 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 She also reports episodes of staring  "during which he is less verbally responsive that can last up to two minutes which have increased in frequency (~1 per day). These are not associated with other abnormal movements...Exam is consistent with parkinsonism which has increased since his last visit. Could be secondary to vascular disease or neurodegeneration. After discussing risks benefits and alternatives, he will trial sinemet to target this.\"  \"Diagnosis: Vascular dementia, Cerebrovascular disease (right frontoparietal stroke and L basal ganglia lacune), Parkinsonism\"    Subjective  Daughter, Surekha, present during evaluation and provided majority of history.   Marlo Carl  is a 84 y.o. male  presenting to the clinic with chief complaint of worsening of walking and balance in the last year. Daughter states that he uses a device in the community and no device in the home but grabbing onto furniture to walk. She states that the stroke affected the Left UE and LE resulting in weakness. She states that he has increased difficulty with getting and out of the car. She states that he has increased fatigue with short distances ~10 feet. She reports shuffling gait. HE reports neuropathic pain in bilateral feet to calves.     CURRENT LEVEL OF FUNCTION: Max A for ADLs (Daughters assist) Family performs IADLs, Modified Independent Ambulation with Rollator  CURRENT DME: cane, rollator      SOCIAL HISTORY/LIVING ARRANGEMENT:   Patient lives with family (wife and 2 adult daughters) in a home with 3 CUCO + HR  Patient with 1+6+4 steps with HR to bedroom/bathroom    EXERCISE/ACTIVITY LEVEL:  sedentary    PATIENT BARRIERS TO REHAB: vascular dementia and h/o multiple strokes    PATIENT GOAL: \"increase mobility and standing/walking endurance\"     General:  General  Reason for Referral: PT evaluate and treat for history of stroke, vascular dementia, worsened gait  Referred By: Kasey Clinton MD  Past Medical History Relevant to Rehab: multiple CVA (R MCA and L " basal ganglia lacune), vascular dementia, HTN, HLD, PE, BPH, CKD3, overactive bladder  Preferred Learning Style: visual, written  Payor: UNITED HE[employer] MEDICARE / Plan: UNITED HEALTHCARE MEDICARE / Product Type: *No Product type* /   Kasey Clinton    Precautions  Precautions  STEADI Fall Risk Score (The score of 4 or more indicates an increased risk of falling): 11  Precautions Comment: High Fall Risk       Pain  Pain Assessment: 0-10  0-10 (Numeric) Pain Score: 7  Pain Type: Neuropathic pain  Pain Location: Leg  Pain Orientation: Right, Left  Pain Interventions: Home medication      Objective     MUSCULOSKELETAL SYSTEM:  GROSS POSTURE:  stand/sitting:  rounded shoulders, forward head, kyphotic, forward flexed at hips, stooped posture overall  ENDURANCE:  Patient demonstrates decreased functional endurance as noted by need for frequent rest breaks and poor tolerance to activity  PASSIVE RANGE OF MOTION: WFL BL LE except decreased bilateral HS flexibility    LE STRENGTH - tested in seated:  Muscle Right LE Left LE   Hip Flexion (L1-L2) 3+/5 3+/5   Hip Abduction 4-/5 3+/5   Hip Adduction 4-/5 4-/5   Knee Flexion (L3) 4/5 4/5   Knee Extension (L4) 4-/5 4-/5   Ankle Dorsiflexion (L5) 3+/5 3/5   Ankle Plantarflexion (S1) 3+/5 3+/5     NEUROMUSCULAR SYSTEM:   OBSERVATION: slight tremor L UE  GROSS SENSATION:  Intact to light touch R/L  Tested L2-S1 Bilateral  TONE: 3 beats clonus R  COORDINATION:    ANANTH: hypometria L / normal R   HTS:  dysmetria L / normal R   FTS: hypometria L/R UE  PROPRIOCEPTION: Intact R/L 3/3 at ankle  TRANSFERS:       SIT <> STAND:  Modified Independent with BL hands  BALANCE: Fair without support  GAIT:  Patient ambulated 50 feet with Rollator at Modified [facility]. Patient demonstrated slow yi, decreased step length, decreased step height, decreased trunk rotation, decreased toe-off at terminal stance, shuffling gait pattern  STAIRS: NT      Outcome Measures:  Rivera Balance Scale  1.  Sitting to Standing: Able to stand independently using hands  2. Standing Unsupported: Able to stand 2 minutes with supervision  3. Sitting with Back Unsupported but Feet Supported on Floor or on a Stool: Able to sit safely and securely for 2 minutes  4. Standing to Sitting: Controls descent by using hands  5.  Transfers: Able to transfer with verbal cueing and/or supervision  6. Standing Unsupported with Eyes Closed: Able to stand 10 seconds with supervision  7. Standing Unsupported with Feet Together: Needs help to attain position but able to stand 15 seconds feet together  8. Reach Forward with Outstretched Arm While Standing: Reaches forward but needs supervision  9.  Object from Floor from a Standing Position: Able to  slipper but needs supervision  10. Turning to Look Behind Over Left and Right Shoulders While Standing: Turns sideways only but maintains balance  11. Turn 360 Degrees: Needs close supervision or verbal cueing  12. Place Alternate Foot on Step or Stool While Standing Unsupported: Able to complete 4 steps without aid with supervision  13. Standing Unsupported One Foot in Front: Able to take small step independently and hold 30 seconds  14. Standing on One Leg: Unable to try needs assist to prevent fall  Rivera Balance Score: 30    Timed Up and Go Test  Observe the patient’s postural stability, gait, stride length, and sway. Select All that Apply:: Slow tentative pace, Shuffling, Little or no arm swing, Short strides  TUG Comment: 26.75 sec without device    Other Measures  30 Second Sit to Stand: 7 stands with arm rests  Activities - Specific Balance Confidence Scale: 170 (10% without device)    Treatments:  THERAPEUTIC EXERCISE x 12 minutes  - Seated Hamstring Stretch with Chair  30-60 sec hold  - Patient Education on Sitting Down & Standing Up  - Sit to Stand  with hands on knees x 6  - Seated Trunk Rotation x 8  - Seated Reaching Across Body  - 1 x daily - 7 x weekly - 3 sets - 10  "reps  - Seated Reach Forward, Up, and To Sides  - 1 x daily - 7 x weekly - 3 sets - 10 reps    Educated patient on purpose and benefits of neurologic physical therapy for diagnosis along with results of examination findings and how this correlates to their chief complaint  Educated patient on purpose of exercises and importance of regular daily home program compliance  Educated patient's daughter on Handi-Bar to assist with car transfers; daughter to look into purchase  Educated patient and daughter on the role of inactivity in decreased endurance/cardiovascular fitness and the benefit of exercising at a moderate aerobic intensity in order to release BDNF which is important for the growth and survival of neurons in the brain to allow for neuroprotective benefits such as slowing degenerative brain diseases and reduce motor disease severity  Educated patient and daughter on what moderate aerobic intensity with seated peddler/walking would be equivalent to: \"be able to hold a short conversation between breaths\"  Issued Walking program to be used for seated peddler to improve cardiovascular endurance and assist with neuro protective benefits  Provided patient with visual, verbal, and written instruction via printed home program handout to ensure carryover    GAIT/STAIR TRAINING x 8 minutes  - Ambulation with increased step length: walk to cone 10 feet away in 8 steps - patient performed 4 times at [facility] with good foot clearance and step length  - Education on making turns with full [place] or quarter turns  - Patient able to perform 1-2 quarter turns fairly well with max cues - he is with increased shuffling and slight freezing with turns  - Educated patient's daughter to practice step counting at home for increased step length as well as quarter turns for safer turning  - Educated patient and daughter on PT recommendation for use of assistive device for all ambulation as he is a high fall risk. He would benefit from " rollator in the community and SPC in home as it is a smaller home. Patient and Daughter expressed understanding     EDUCATION: see treatment section above for details. Patient verbalized and expressed good understanding of all information.     HOME EXERCISE PROGRAM:  Access Code: [email or web address]  URL: [email or web address]  Date: 12/04/2024  Prepared by: Deonte Shore    Exercises  - Seated Hamstring Stretch with Chair  - 1 x daily - 7 x weekly - 3 sets - 30-60 sec hold  - Sit to Stand  - 1 x daily - 7 x weekly - 3 sets - 10 reps  - Seated Trunk Rotation  - 1 x daily - 7 x weekly - 3 sets - 10 reps  - Seated Reaching Across Body  - 1 x daily - 7 x weekly - 3 sets - 10 reps  - Seated Reach Forward, Up, and To Sides  - 1 x daily - 7 x weekly - 3 sets - 10 reps    Patient Education  - Sitting Down & Standing Up    Goals:  Active       PT Problem       Patient will perform TUG with LRAD or no device in </= 21.9 sec to meet MDC of 4.85 sec for PD population and demonstrate decreased fall risk       Start:  12/04/24    Expected End:  03/04/25       STG         Patient will improve 30 sec STS test by >/= 2 stands with 1 arm rest or none to demonstrate increased functional LE strength/endurance to assist with gait, stair negotiation, and functional transfers       Start:  12/04/24    Expected End:  03/04/25       LTG         Patient will score >/=35/56 on JEONG to meet MDC (5 pts for Chronic Stroke and PD population) and demonstrate improvement in balance and functional mobility       Start:  12/04/24    Expected End:  03/04/25       LTG         Patient will improve ABC Scale by 10% to demonstrate improved balance confidence        Start:  12/04/24    Expected End:  03/04/25       STG         Patient will be at Minimal Assist from Daughter with home exercise program to demonstrate compliance and self-management       Start:  12/04/24    Expected End:  03/04/25       LTG         Patient Stated Goal -  "\"increase mobility and standing/walking endurance\"        Start:  12/04/24    Expected End:  03/04/25       LTG               "

## 2024-12-06 ENCOUNTER — TELEPHONE (OUTPATIENT)
Dept: UROLOGY | Facility: CLINIC | Age: 84
End: 2024-12-06
Payer: MEDICARE

## 2024-12-06 DIAGNOSIS — N40.1 BENIGN PROSTATIC HYPERPLASIA WITH NOCTURIA: ICD-10-CM

## 2024-12-06 DIAGNOSIS — R35.1 BENIGN PROSTATIC HYPERPLASIA WITH NOCTURIA: ICD-10-CM

## 2024-12-06 NOTE — TELEPHONE ENCOUNTER
Patient daughter reached out for a refill on medication Myrbetriq 50mg, I sent medication orders to Dr. German and Dr. Delgado for signing.

## 2024-12-07 RX ORDER — MIRABEGRON 50 MG/1
50 TABLET, FILM COATED, EXTENDED RELEASE ORAL DAILY
Qty: 30 TABLET | Refills: 11 | Status: SHIPPED | OUTPATIENT
Start: 2024-12-07

## 2024-12-19 ENCOUNTER — HOSPITAL ENCOUNTER (OUTPATIENT)
Dept: NEUROLOGY | Facility: HOSPITAL | Age: 84
Discharge: HOME | End: 2024-12-19
Payer: MEDICARE

## 2024-12-19 DIAGNOSIS — F01.A0 MILD VASCULAR DEMENTIA WITHOUT BEHAVIORAL DISTURBANCE, PSYCHOTIC DISTURBANCE, MOOD DISTURBANCE, OR ANXIETY: ICD-10-CM

## 2024-12-19 PROCEDURE — 95819 EEG AWAKE AND ASLEEP: CPT

## 2024-12-26 ENCOUNTER — APPOINTMENT (OUTPATIENT)
Dept: PHYSICAL THERAPY | Facility: CLINIC | Age: 84
End: 2024-12-26
Payer: MEDICARE

## 2024-12-26 NOTE — PROGRESS NOTES
Physical Therapy    Physical Therapy Treatment    Patient Name: Marlo Carl  MRN: [id number]  Today's Date: 12/26/2024                Visit number: Visit count could not be calculated. Make sure you are using a visit which is associated with an episode.  Insurance: Payor: [employer] MEDICARE / Plan: [employer] MEDICARE / Product Type: *No Product type* /   Plan of Care: 12/4/24 - 3/4/25  Authorization: Pending  Primary Diagnosis: Decreased strength, endurance, and mobility         Assessment:   Patient presents for first follow-up PT session this date. Patient tolerated all therapeutic activities well with ***. Home exercises were reviewed and patient required *** cues with review of home program exercises to ensure proper form and technique. Patient would continue to benefit from skilled PT to continue to improve strength, balance, gait, and overall functional mobility.    Plan: Continue per plan of care to improve functional strength and balance.        Current Problem  Problem List Items Addressed This Visit    None        Subjective    Patient states ***    Precautions:        Patient with increased fall risk, therefore, balance activities performed with gait belt donned at [facility] for safety.      Pain       Objective   Outcome Measures:  None assessed this date    Treatments:  THERAPEUTIC EXERCISE x *** minutes  GAIT/STAIR TRAINING x *** minutes  THERAPEUTIC ACTIVITY x *** minutes  NEUROMUSCULAR REEDUCATION x *** minutes  MANUAL THERAPY x *** minutes      OP EDUCATION:       HOME EXERCISE PROGRAM:      Goals:

## 2025-01-02 ENCOUNTER — TREATMENT (OUTPATIENT)
Dept: PHYSICAL THERAPY | Facility: CLINIC | Age: 85
End: 2025-01-02
Payer: MEDICARE

## 2025-01-02 DIAGNOSIS — R68.89 DECREASED STRENGTH, ENDURANCE, AND MOBILITY: Primary | ICD-10-CM

## 2025-01-02 DIAGNOSIS — R53.1 DECREASED STRENGTH, ENDURANCE, AND MOBILITY: Primary | ICD-10-CM

## 2025-01-02 DIAGNOSIS — R26.89 IMBALANCE: ICD-10-CM

## 2025-01-02 DIAGNOSIS — R26.9 ABNORMALITY OF GAIT AND MOBILITY: ICD-10-CM

## 2025-01-02 DIAGNOSIS — Z74.09 DECREASED STRENGTH, ENDURANCE, AND MOBILITY: Primary | ICD-10-CM

## 2025-01-02 DIAGNOSIS — I63.9: ICD-10-CM

## 2025-01-02 PROCEDURE — 97110 THERAPEUTIC EXERCISES: CPT | Mod: GP

## 2025-01-02 NOTE — PROGRESS NOTES
Physical Therapy    Physical Therapy Treatment    Patient Name: Marlo aCrl  MRN: [id number]  Today's Date: 1/2/2025  Time Calculation  Start Time: 1521  Stop Time: 1602  Time Calculation (min): 41 min     PT Therapeutic Procedures Time Entry  Therapeutic Exercise Time Entry: 40       Visit number: 2  Insurance: Payor: [employer] MEDICARE / Plan: [employer] MEDICARE / Product Type: *No Product type* /   Plan of Care: 12/4/24 - 3/4/25  Authorization: [employer]V [employer] APPROVED   Primary Diagnosis: Decreased strength, endurance, and mobility         Assessment:   Patient presents for first follow-up PT session this date. Patient tolerated all therapeutic activities well with moderate fatigue. He demonstrates apathy and poor engagement throughout session which appears to be a barrier limiting progress in rehab. Home exercises were reviewed and patient required max cues and demonstration with review of home program exercises to ensure proper form and technique. Patient and daughter were educated regarding importance of home program compliance to allow for optimal carryover and rehab outcome in order to maintain functional mobility. Patient would continue to benefit from skilled PT to continue to improve strength, balance, gait, and overall functional mobility.    Plan: Continue per plan of care to improve functional strength and balance. Functional strength in standing       Current Problem  Problem List Items Addressed This Visit             ICD-10-CM    Abnormality of gait and mobility R26.9     Other Visit Diagnoses         Codes    Decreased strength, endurance, and mobility    -  Primary R53.1, Z74.09, R68.89    Imbalance     R26.89    CVA (cerebrovascular accident) (Multi)     I63.9            Subjective    Patient states that he has not been doing the home exercises as he sits and watches tv most of the day.     Precautions:    Precautions  STEADI Fall Risk Score (The score of 4 or more indicates an increased  risk of falling): 11  Precautions Comment: High Fall Risk    Patient with increased fall risk, therefore, balance activities performed with gait belt donned at [facility] for safety.      Pain    0/10 - no pain    Objective     Treatments:  THERAPEUTIC EXERCISE x 40 minutes  - Functional mobility with Rollator at Supervision with VC for increased step length and up right posture 2 x 50 feet  - Seated stepper lvl 2 x 6 min for strengthening and aerobic conditioning   - Seated Hamstring Stretch with Chair  30-60 sec hold  - Sit to Stand  with hands on knees x 10  - Seated Trunk Rotation x 8 R/L  - Seated Reaching Across Body x 10 R/L  - Seated marches x 10  - Seated LAQ x 10  - Seated heel toe raises x 10  - Seated PWR!Up x 6   - Seated PWR!Step legs only x 5 R/L  - Seated Hip adduction with ball x 10  - Seated hip abduction with ball x 10      OP EDUCATION: Educated patient on importance of home program compliance and issued printed home program with exercise tracker for motivation. Patient daughter educated on exercises and encouraged her to assist with patient motivation and to perform exercises with patient to allow for carry-over     HOME EXERCISE PROGRAM:  Access Code: [email or web address]  URL: [email or web address]  Date: 01/02/2025  Prepared by: Deonte Shore    Exercises  - Seated Hamstring Stretch with Chair  - 1 x daily - 7 x weekly - 3 sets - 30-60 sec hold  - Sit to Stand  - 1 x daily - 7 x weekly - 3 sets - 10 reps  - Seated Trunk Rotation  - 1 x daily - 7 x weekly - 3 sets - 10 reps  - Seated Reaching Across Body  - 1 x daily - 7 x weekly - 3 sets - 10 reps  - Seated Reach Forward, Up, and To Sides  - 1 x daily - 7 x weekly - 3 sets - 10 reps  - Seated March  - 1 x daily - 7 x weekly - 3 sets - 10 reps  - Seated Long Arc Quad  - 1 x daily - 7 x weekly - 3 sets - 10 reps  - Seated Heel Toe Raises  - 1 x daily - 7 x weekly - 3 sets - 10 reps  - Seated Hip Adduction Isometrics with Ball  - 1 x daily  "- 7 x weekly - 3 sets - 10 reps  - Seated Hip Abduction  - 1 x daily - 7 x weekly - 3 sets - 10 reps    Patient Education  - Sitting Down & Standing Up    Goals:  Active       PT Problem       Patient will perform TUG with LRAD or no device in </= 21.9 sec to meet MDC of 4.85 sec for PD population and demonstrate decreased fall risk       Start:  12/04/24    Expected End:  03/04/25       STG         Patient will improve 30 sec STS test by >/= 2 stands with 1 arm rest or none to demonstrate increased functional LE strength/endurance to assist with gait, stair negotiation, and functional transfers       Start:  12/04/24    Expected End:  03/04/25       LTG         Patient will score >/=35/56 on JEONG to meet MDC (5 pts for Chronic Stroke and PD population) and demonstrate improvement in balance and functional mobility       Start:  12/04/24    Expected End:  03/04/25       LTG         Patient will improve ABC Scale by 10% to demonstrate improved balance confidence        Start:  12/04/24    Expected End:  03/04/25       STG         Patient will be at Minimal Assist from Daughter with home exercise program to demonstrate compliance and self-management       Start:  12/04/24    Expected End:  03/04/25       LTG         Patient Stated Goal - \"increase mobility and standing/walking endurance\"        Start:  12/04/24    Expected End:  03/04/25       LTG             "

## 2025-01-08 ENCOUNTER — TREATMENT (OUTPATIENT)
Dept: PHYSICAL THERAPY | Facility: CLINIC | Age: 85
End: 2025-01-08
Payer: MEDICARE

## 2025-01-08 DIAGNOSIS — R26.89 IMBALANCE: ICD-10-CM

## 2025-01-08 DIAGNOSIS — Z74.09 DECREASED STRENGTH, ENDURANCE, AND MOBILITY: Primary | ICD-10-CM

## 2025-01-08 DIAGNOSIS — R26.9 ABNORMALITY OF GAIT AND MOBILITY: ICD-10-CM

## 2025-01-08 DIAGNOSIS — I63.9: ICD-10-CM

## 2025-01-08 DIAGNOSIS — R68.89 DECREASED STRENGTH, ENDURANCE, AND MOBILITY: Primary | ICD-10-CM

## 2025-01-08 DIAGNOSIS — R53.1 DECREASED STRENGTH, ENDURANCE, AND MOBILITY: Primary | ICD-10-CM

## 2025-01-08 PROCEDURE — 97110 THERAPEUTIC EXERCISES: CPT | Mod: GP

## 2025-01-08 PROCEDURE — 97112 NEUROMUSCULAR REEDUCATION: CPT | Mod: GP

## 2025-01-15 ENCOUNTER — APPOINTMENT (OUTPATIENT)
Dept: PHYSICAL THERAPY | Facility: CLINIC | Age: 85
End: 2025-01-15
Payer: MEDICARE

## 2025-01-15 DIAGNOSIS — E78.49 OTHER HYPERLIPIDEMIA: ICD-10-CM

## 2025-01-15 RX ORDER — ATORVASTATIN CALCIUM 20 MG/1
20 TABLET, FILM COATED ORAL DAILY
Qty: 90 TABLET | Refills: 0 | Status: SHIPPED | OUTPATIENT
Start: 2025-01-15 | End: 2025-04-15

## 2025-01-15 NOTE — TELEPHONE ENCOUNTER
Rx Refill Request Telephone Encounter    Name:  Marlo Siddhartha  :  [phone]  Specific Pharmacy location:  [facility]

## 2025-01-22 ENCOUNTER — DOCUMENTATION (OUTPATIENT)
Dept: PHYSICAL THERAPY | Facility: CLINIC | Age: 85
End: 2025-01-22
Payer: MEDICARE

## 2025-01-22 ENCOUNTER — APPOINTMENT (OUTPATIENT)
Dept: PHYSICAL THERAPY | Facility: CLINIC | Age: 85
End: 2025-01-22
Payer: MEDICARE

## 2025-01-22 NOTE — PROGRESS NOTES
Physical Therapy                 Therapy Communication Note    Patient Name: Marlo Carl  MRN: [id number]  Department:   Room: Room/bed info not found  Today's Date: 1/22/2025     Discipline: Physical Therapy      Missed Time: Cancel    Comment: Cancelled via MyChart with no reason given

## 2025-01-29 ENCOUNTER — TREATMENT (OUTPATIENT)
Dept: PHYSICAL THERAPY | Facility: CLINIC | Age: 85
End: 2025-01-29
Payer: MEDICARE

## 2025-01-29 DIAGNOSIS — Z74.09 DECREASED STRENGTH, ENDURANCE, AND MOBILITY: Primary | ICD-10-CM

## 2025-01-29 DIAGNOSIS — R68.89 DECREASED STRENGTH, ENDURANCE, AND MOBILITY: Primary | ICD-10-CM

## 2025-01-29 DIAGNOSIS — R53.1 DECREASED STRENGTH, ENDURANCE, AND MOBILITY: Primary | ICD-10-CM

## 2025-01-29 DIAGNOSIS — R26.89 IMBALANCE: ICD-10-CM

## 2025-01-29 DIAGNOSIS — I63.9: ICD-10-CM

## 2025-01-29 DIAGNOSIS — R26.9 ABNORMALITY OF GAIT AND MOBILITY: ICD-10-CM

## 2025-01-29 PROCEDURE — 97110 THERAPEUTIC EXERCISES: CPT | Mod: GP

## 2025-01-29 PROCEDURE — 97112 NEUROMUSCULAR REEDUCATION: CPT | Mod: GP

## 2025-01-29 NOTE — PROGRESS NOTES
Physical Therapy    Physical Therapy Treatment    Patient Name: Marlo Carl  MRN: [id number]  Today's Date: 1/29/2025  Time Calculation  Start Time: 1738  Stop Time: 1819  Time Calculation (min): 41 min     PT Therapeutic Procedures Time Entry  Neuromuscular Re-Education Time Entry: 27  Therapeutic Exercise Time Entry: 14       Visit number: 4  Insurance: Payor: [employer] MEDICARE / Plan: [employer]ALTH[employer] MEDICARE / Product Type: *No Product type* /   Plan of Care: 12/4/24 - 3/4/25  Authorization: 20V PT APPROVED   Primary Diagnosis: Decreased strength, endurance, and mobility         Assessment:  Patient tolerated all therapeutic activities well with continued apathy and fair engagement throughout session. He required max demonstration and verbal cues as well as visual feedback to perform all exercises with fair form. He requires verbal cues for correct hand placement of hands for STS transfers. He continues to demonstrate poor carryover of increased step length walking out of clinic with rollator post intervention with focus to increase step height and length. Patient would continue to benefit from skilled PT to continue to improve strength, balance, gait, and overall functional mobility.    Plan: Continue per plan of care to improve functional strength and balance. Functional strength in standing       Current Problem  Problem List Items Addressed This Visit             ICD-10-CM    Abnormality of gait and mobility R26.9     Other Visit Diagnoses         Codes    Decreased strength, endurance, and mobility    -  Primary R53.1, Z74.09, R68.89    Imbalance     R26.89    CVA (cerebrovascular accident) (Multi)     I63.9            Subjective    Patient states that he has been doing the home exercises with the help of his daughters every other day.     Precautions:    Precautions  STEADI Fall Risk Score (The score of 4 or more indicates an increased risk of falling): 11  Precautions Comment: High Fall  Risk    Patient with increased fall risk, therefore, balance activities performed with gait belt donned at [facility] for safety.      Pain    0/10 - no pain    Objective     Treatments:  THERAPEUTIC EXERCISE x 14 minutes  - Seated stepper lvl 2.5 x 6 min for strengthening and aerobic conditioning   In // bars with BL UE support  - mini squat on airex x 10  - standing marches on airex x 10 R/L    NEUROMUSCULAR REEDUCATION x 27 minutes  - Seated PWR!Up x 6  - Seated PWR!Step legs only 2 x 10 R/L  - STS PWR!Up x 10   - Seated PWR!Twist x 10   - Wide PHILIPP on airex with head turns R/L x 10 at [facility]  - Standing PWR!Step onto airex with BL UE support x 12 R/L  - Passing soccer ball with therapist at BL UE support   - Passing soccer ball with therapist at  UE support for SLS stability    OP EDUCATION: Educated patient on importance of home program compliance and issued printed home program with exercise tracker for motivation. Patient daughter educated on exercises and encouraged her to assist with patient motivation and to perform exercises with patient to allow for carry-over     HOME EXERCISE PROGRAM:  Access Code: [email or web address]  URL: [email or web address]  Date: 01/02/2025  Prepared by: Deonte Shore    Exercises  - Seated Hamstring Stretch with Chair  - 1 x daily - 7 x weekly - 3 sets - 30-60 sec hold  - Sit to Stand  - 1 x daily - 7 x weekly - 3 sets - 10 reps  - Seated Trunk Rotation  - 1 x daily - 7 x weekly - 3 sets - 10 reps  - Seated Reaching Across Body  - 1 x daily - 7 x weekly - 3 sets - 10 reps  - Seated Reach Forward, Up, and To Sides  - 1 x daily - 7 x weekly - 3 sets - 10 reps  - Seated March  - 1 x daily - 7 x weekly - 3 sets - 10 reps  - Seated Long Arc Quad  - 1 x daily - 7 x weekly - 3 sets - 10 reps  - Seated Heel Toe Raises  - 1 x daily - 7 x weekly - 3 sets - 10 reps  - Seated Hip Adduction Isometrics with Ball  - 1 x daily - 7 x weekly - 3 sets - 10 reps  - Seated Hip Abduction  - 1 x  "daily - 7 x weekly - 3 sets - 10 reps    Patient Education  - Sitting Down & Standing Up    Access Code: [email or web address]  URL: [email or web address]  Date: 01/08/2025  Prepared by: Deonte Shore    Exercises  - Mini Squat with Counter Support  - 1 x daily - 7 x weekly - 3 sets - 10 reps  - Standing March with Counter Support  - 1 x daily - 7 x weekly - 3 sets - 10 reps  - Heel Raises with Counter Support  - 1 x daily - 7 x weekly - 3 sets - 10 reps  - Side Stepping with Counter Support  - 1 x daily - 7 x weekly - 3 sets - 10 reps  - Staggered Stance Forward Backward Weight Shift with Counter Support  - 1 x daily - 7 x weekly - 3 sets - 10 reps    Goals:  Active       PT Problem       Patient will perform TUG with LRAD or no device in </= 21.9 sec to meet MDC of 4.85 sec for PD population and demonstrate decreased fall risk       Start:  12/04/24    Expected End:  03/04/25       STG         Patient will improve 30 sec STS test by >/= 2 stands with 1 arm rest or none to demonstrate increased functional LE strength/endurance to assist with gait, stair negotiation, and functional transfers       Start:  12/04/24    Expected End:  03/04/25       LTG         Patient will score >/=35/56 on JEONG to meet MDC (5 pts for Chronic Stroke and PD population) and demonstrate improvement in balance and functional mobility       Start:  12/04/24    Expected End:  03/04/25       LTG         Patient will improve ABC Scale by 10% to demonstrate improved balance confidence        Start:  12/04/24    Expected End:  03/04/25       STG         Patient will be at Minimal Assist from Daughter with home exercise program to demonstrate compliance and self-management       Start:  12/04/24    Expected End:  03/04/25       LTG         Patient Stated Goal - \"increase mobility and standing/walking endurance\"        Start:  12/04/24    Expected End:  03/04/25       LTG           "

## 2025-02-05 ENCOUNTER — APPOINTMENT (OUTPATIENT)
Dept: RADIOLOGY | Facility: HOSPITAL | Age: 85
End: 2025-02-05
Payer: MEDICARE

## 2025-02-05 ENCOUNTER — HOSPITAL ENCOUNTER (INPATIENT)
Facility: HOSPITAL | Age: 85
End: 2025-02-05
Attending: EMERGENCY MEDICINE | Admitting: INTERNAL MEDICINE
Payer: MEDICARE

## 2025-02-05 ENCOUNTER — APPOINTMENT (OUTPATIENT)
Dept: CARDIOLOGY | Facility: HOSPITAL | Age: 85
End: 2025-02-05
Payer: MEDICARE

## 2025-02-05 ENCOUNTER — APPOINTMENT (OUTPATIENT)
Dept: PHYSICAL THERAPY | Facility: CLINIC | Age: 85
End: 2025-02-05
Payer: MEDICARE

## 2025-02-05 DIAGNOSIS — J18.9 ATYPICAL PNEUMONIA: ICD-10-CM

## 2025-02-05 DIAGNOSIS — N17.9 AKI (ACUTE KIDNEY INJURY) (CMS-HCC): ICD-10-CM

## 2025-02-05 DIAGNOSIS — R53.1 GENERALIZED WEAKNESS: Primary | ICD-10-CM

## 2025-02-05 DIAGNOSIS — F01.A0 MILD VASCULAR DEMENTIA WITHOUT BEHAVIORAL DISTURBANCE, PSYCHOTIC DISTURBANCE, MOOD DISTURBANCE, OR ANXIETY: Primary | ICD-10-CM

## 2025-02-05 LAB
ALBUMIN SERPL BCP-MCNC: 3.7 G/DL (ref 3.4–5)
ALP SERPL-CCNC: 76 U/L (ref 33–136)
ALT SERPL W P-5'-P-CCNC: 17 U/L (ref 10–52)
ANION GAP BLDV CALCULATED.4IONS-SCNC: 14 MMOL/L (ref 10–25)
ANION GAP SERPL CALC-SCNC: 15 MMOL/L (ref 10–20)
AST SERPL W P-5'-P-CCNC: 27 U/L (ref 9–39)
BASE EXCESS BLDV CALC-SCNC: -1.4 MMOL/L (ref -2–3)
BASOPHILS # BLD AUTO: 0.01 X10*3/UL (ref 0–0.1)
BASOPHILS NFR BLD AUTO: 0.1 %
BILIRUB SERPL-MCNC: 0.4 MG/DL (ref 0–1.2)
BNP SERPL-MCNC: 534 PG/ML (ref 0–99)
BODY TEMPERATURE: 37 DEGREES CELSIUS
BUN SERPL-MCNC: 43 MG/DL (ref 6–23)
BURR CELLS BLD QL SMEAR: NORMAL
CA-I BLDV-SCNC: 1.18 MMOL/L (ref 1.1–1.33)
CALCIUM SERPL-MCNC: 9.1 MG/DL (ref 8.6–10.3)
CARDIAC TROPONIN I PNL SERPL HS: 200 NG/L (ref 0–20)
CARDIAC TROPONIN I PNL SERPL HS: 210 NG/L (ref 0–20)
CHLORIDE BLDV-SCNC: 105 MMOL/L (ref 98–107)
CHLORIDE SERPL-SCNC: 109 MMOL/L (ref 98–107)
CO2 SERPL-SCNC: 23 MMOL/L (ref 21–32)
CREAT SERPL-MCNC: 2.53 MG/DL (ref 0.5–1.3)
EGFRCR SERPLBLD CKD-EPI 2021: 24 ML/MIN/1.73M*2
EOSINOPHIL # BLD AUTO: 0 X10*3/UL (ref 0–0.4)
EOSINOPHIL NFR BLD AUTO: 0 %
ERYTHROCYTE [DISTWIDTH] IN BLOOD BY AUTOMATED COUNT: 14.9 % (ref 11.5–14.5)
FLUAV RNA RESP QL NAA+PROBE: NOT DETECTED
FLUBV RNA RESP QL NAA+PROBE: NOT DETECTED
GLUCOSE BLDV-MCNC: 116 MG/DL (ref 74–99)
GLUCOSE SERPL-MCNC: 122 MG/DL (ref 74–99)
HCO3 BLDV-SCNC: 25.2 MMOL/L (ref 22–26)
HCT VFR BLD AUTO: 41.5 % (ref 41–52)
HCT VFR BLD EST: 39 % (ref 41–52)
HGB BLD-MCNC: 12.6 G/DL (ref 13.5–17.5)
HGB BLDV-MCNC: 13.1 G/DL (ref 13.5–17.5)
IMM GRANULOCYTES # BLD AUTO: 0.04 X10*3/UL (ref 0–0.5)
IMM GRANULOCYTES NFR BLD AUTO: 0.4 % (ref 0–0.9)
INHALED O2 CONCENTRATION: 21 %
INR PPP: 1.2 (ref 0.9–1.1)
LACTATE BLDV-SCNC: 2.8 MMOL/L (ref 0.4–2)
LACTATE SERPL-SCNC: 2.6 MMOL/L (ref 0.4–2)
LYMPHOCYTES # BLD AUTO: 0.38 X10*3/UL (ref 0.8–3)
LYMPHOCYTES NFR BLD AUTO: 3.4 %
MAGNESIUM SERPL-MCNC: 2.08 MG/DL (ref 1.6–2.4)
MCH RBC QN AUTO: 27.1 PG (ref 26–34)
MCHC RBC AUTO-ENTMCNC: 30.4 G/DL (ref 32–36)
MCV RBC AUTO: 89 FL (ref 80–100)
MONOCYTES # BLD AUTO: 0.3 X10*3/UL (ref 0.05–0.8)
MONOCYTES NFR BLD AUTO: 2.7 %
NEUTROPHILS # BLD AUTO: 10.59 X10*3/UL (ref 1.6–5.5)
NEUTROPHILS NFR BLD AUTO: 93.4 %
NRBC BLD-RTO: 0 /100 WBCS (ref 0–0)
OVALOCYTES BLD QL SMEAR: NORMAL
OXYHGB MFR BLDV: 32.2 % (ref 45–75)
PCO2 BLDV: 49 MM HG (ref 41–51)
PH BLDV: 7.32 PH (ref 7.33–7.43)
PLATELET # BLD AUTO: 205 X10*3/UL (ref 150–450)
PO2 BLDV: 24 MM HG (ref 35–45)
POTASSIUM BLDV-SCNC: 4.1 MMOL/L (ref 3.5–5.3)
POTASSIUM SERPL-SCNC: 4.1 MMOL/L (ref 3.5–5.3)
PROT SERPL-MCNC: 7.9 G/DL (ref 6.4–8.2)
PROTHROMBIN TIME: 13 SECONDS (ref 9.8–12.8)
RBC # BLD AUTO: 4.65 X10*6/UL (ref 4.5–5.9)
RBC MORPH BLD: NORMAL
RSV RNA RESP QL NAA+PROBE: NOT DETECTED
SAO2 % BLDV: 33 % (ref 45–75)
SARS-COV-2 RNA RESP QL NAA+PROBE: NOT DETECTED
SCHISTOCYTES BLD QL SMEAR: NORMAL
SODIUM BLDV-SCNC: 140 MMOL/L (ref 136–145)
SODIUM SERPL-SCNC: 143 MMOL/L (ref 136–145)
WBC # BLD AUTO: 11.3 X10*3/UL (ref 4.4–11.3)

## 2025-02-05 PROCEDURE — 36415 COLL VENOUS BLD VENIPUNCTURE: CPT | Performed by: NURSE PRACTITIONER

## 2025-02-05 PROCEDURE — 1200000002 HC GENERAL ROOM WITH TELEMETRY DAILY

## 2025-02-05 PROCEDURE — 71046 X-RAY EXAM CHEST 2 VIEWS: CPT

## 2025-02-05 PROCEDURE — 70450 CT HEAD/BRAIN W/O DYE: CPT

## 2025-02-05 PROCEDURE — 2500000004 HC RX 250 GENERAL PHARMACY W/ HCPCS (ALT 636 FOR OP/ED): Performed by: INTERNAL MEDICINE

## 2025-02-05 PROCEDURE — 87637 SARSCOV2&INF A&B&RSV AMP PRB: CPT | Performed by: NURSE PRACTITIONER

## 2025-02-05 PROCEDURE — 82435 ASSAY OF BLOOD CHLORIDE: CPT | Performed by: NURSE PRACTITIONER

## 2025-02-05 PROCEDURE — 74176 CT ABD & PELVIS W/O CONTRAST: CPT | Performed by: RADIOLOGY

## 2025-02-05 PROCEDURE — 84484 ASSAY OF TROPONIN QUANT: CPT | Performed by: EMERGENCY MEDICINE

## 2025-02-05 PROCEDURE — 2500000004 HC RX 250 GENERAL PHARMACY W/ HCPCS (ALT 636 FOR OP/ED): Performed by: EMERGENCY MEDICINE

## 2025-02-05 PROCEDURE — 99285 EMERGENCY DEPT VISIT HI MDM: CPT | Mod: 25 | Performed by: EMERGENCY MEDICINE

## 2025-02-05 PROCEDURE — 80053 COMPREHEN METABOLIC PANEL: CPT | Performed by: NURSE PRACTITIONER

## 2025-02-05 PROCEDURE — 83605 ASSAY OF LACTIC ACID: CPT | Performed by: NURSE PRACTITIONER

## 2025-02-05 PROCEDURE — 84132 ASSAY OF SERUM POTASSIUM: CPT | Performed by: NURSE PRACTITIONER

## 2025-02-05 PROCEDURE — 93005 ELECTROCARDIOGRAM TRACING: CPT

## 2025-02-05 PROCEDURE — 70450 CT HEAD/BRAIN W/O DYE: CPT | Performed by: RADIOLOGY

## 2025-02-05 PROCEDURE — 71046 X-RAY EXAM CHEST 2 VIEWS: CPT | Performed by: RADIOLOGY

## 2025-02-05 PROCEDURE — 84484 ASSAY OF TROPONIN QUANT: CPT | Performed by: NURSE PRACTITIONER

## 2025-02-05 PROCEDURE — 85025 COMPLETE CBC W/AUTO DIFF WBC: CPT | Performed by: NURSE PRACTITIONER

## 2025-02-05 PROCEDURE — 2500000002 HC RX 250 W HCPCS SELF ADMINISTERED DRUGS (ALT 637 FOR MEDICARE OP, ALT 636 FOR OP/ED): Performed by: INTERNAL MEDICINE

## 2025-02-05 PROCEDURE — 74176 CT ABD & PELVIS W/O CONTRAST: CPT

## 2025-02-05 PROCEDURE — 94640 AIRWAY INHALATION TREATMENT: CPT

## 2025-02-05 PROCEDURE — 96360 HYDRATION IV INFUSION INIT: CPT

## 2025-02-05 PROCEDURE — 83880 ASSAY OF NATRIURETIC PEPTIDE: CPT | Performed by: NURSE PRACTITIONER

## 2025-02-05 PROCEDURE — 83735 ASSAY OF MAGNESIUM: CPT | Performed by: NURSE PRACTITIONER

## 2025-02-05 PROCEDURE — 87636 SARSCOV2 & INF A&B AMP PRB: CPT | Performed by: NURSE PRACTITIONER

## 2025-02-05 PROCEDURE — 85610 PROTHROMBIN TIME: CPT | Performed by: NURSE PRACTITIONER

## 2025-02-05 RX ORDER — ONDANSETRON HYDROCHLORIDE 2 MG/ML
4 INJECTION, SOLUTION INTRAVENOUS EVERY 6 HOURS PRN
Status: ACTIVE | OUTPATIENT
Start: 2025-02-05

## 2025-02-05 RX ORDER — POLYETHYLENE GLYCOL 3350 17 G/17G
17 POWDER, FOR SOLUTION ORAL DAILY
Status: DISCONTINUED | OUTPATIENT
Start: 2025-02-06 | End: 2025-02-06

## 2025-02-05 RX ORDER — BENZONATATE 100 MG/1
100 CAPSULE ORAL 3 TIMES DAILY PRN
Status: ACTIVE | OUTPATIENT
Start: 2025-02-05

## 2025-02-05 RX ORDER — DONEPEZIL HYDROCHLORIDE 10 MG/1
10 TABLET, FILM COATED ORAL NIGHTLY
Qty: 30 TABLET | Refills: 5 | Status: ON HOLD | OUTPATIENT
Start: 2025-02-05 | End: 2025-08-04

## 2025-02-05 RX ORDER — CEFTRIAXONE 1 G/50ML
1 INJECTION, SOLUTION INTRAVENOUS EVERY 24 HOURS
Status: DISPENSED | OUTPATIENT
Start: 2025-02-05

## 2025-02-05 RX ORDER — TALC
3 POWDER (GRAM) TOPICAL NIGHTLY PRN
Status: DISPENSED | OUTPATIENT
Start: 2025-02-05

## 2025-02-05 RX ORDER — ACETAMINOPHEN 325 MG/1
650 TABLET ORAL EVERY 6 HOURS PRN
Status: DISPENSED | OUTPATIENT
Start: 2025-02-05

## 2025-02-05 RX ORDER — IPRATROPIUM BROMIDE AND ALBUTEROL SULFATE 2.5; .5 MG/3ML; MG/3ML
3 SOLUTION RESPIRATORY (INHALATION)
Status: DISCONTINUED | OUTPATIENT
Start: 2025-02-05 | End: 2025-02-05

## 2025-02-05 RX ORDER — IPRATROPIUM BROMIDE AND ALBUTEROL SULFATE 2.5; .5 MG/3ML; MG/3ML
3 SOLUTION RESPIRATORY (INHALATION) EVERY 2 HOUR PRN
Status: ACTIVE | OUTPATIENT
Start: 2025-02-05

## 2025-02-05 RX ORDER — PANTOPRAZOLE SODIUM 40 MG/1
40 TABLET, DELAYED RELEASE ORAL
Status: DISPENSED | OUTPATIENT
Start: 2025-02-06

## 2025-02-05 RX ADMIN — CEFTRIAXONE SODIUM 1 G: 1 INJECTION, SOLUTION INTRAVENOUS at 23:57

## 2025-02-05 RX ADMIN — SODIUM CHLORIDE, POTASSIUM CHLORIDE, SODIUM LACTATE AND CALCIUM CHLORIDE 1000 ML: 600; 310; 30; 20 INJECTION, SOLUTION INTRAVENOUS at 17:07

## 2025-02-05 RX ADMIN — IPRATROPIUM BROMIDE AND ALBUTEROL SULFATE 3 ML: 2.5; .5 SOLUTION RESPIRATORY (INHALATION) at 21:47

## 2025-02-05 RX ADMIN — DOXYCYCLINE 100 MG: 100 INJECTION, POWDER, LYOPHILIZED, FOR SOLUTION INTRAVENOUS at 21:23

## 2025-02-05 ASSESSMENT — COLUMBIA-SUICIDE SEVERITY RATING SCALE - C-SSRS
1. IN THE PAST MONTH, HAVE YOU WISHED YOU WERE DEAD OR WISHED YOU COULD GO TO SLEEP AND NOT WAKE UP?: NO
2. HAVE YOU ACTUALLY HAD ANY THOUGHTS OF KILLING YOURSELF?: NO
6. HAVE YOU EVER DONE ANYTHING, STARTED TO DO ANYTHING, OR PREPARED TO DO ANYTHING TO END YOUR LIFE?: NO

## 2025-02-05 ASSESSMENT — PAIN SCALES - GENERAL: PAINLEVEL_OUTOF10: 0 - NO PAIN

## 2025-02-05 ASSESSMENT — PAIN - FUNCTIONAL ASSESSMENT: PAIN_FUNCTIONAL_ASSESSMENT: 0-10

## 2025-02-05 ASSESSMENT — PAIN DESCRIPTION - PROGRESSION: CLINICAL_PROGRESSION: NOT CHANGED

## 2025-02-05 NOTE — ED PROVIDER NOTES
HPI   Chief Complaint   Patient presents with    Weakness, Gen    Diarrhea       The patient is an 84-year-old male past medical history of BPH, recurrent urinary tract infections, CKD, dementia, history of pulmonary embolism/CVA in the past presenting with generalized weakness, vomiting, diarrhea.  Patient and family note that his granddaughter tested positive for COVID to have 3 days ago, patient was exposed to this individual and afterwards began to experience above symptoms.  Family noted that he became weak to the point today that he could not get out of bed and they needed to carry him to the car to bring him to the ED.  They do note that he had several episodes of white watery diarrhea over the last few days, denies noting any dark black/bloody stools.  Has had 1-2 episodes of emesis that were light-colored, appear to be recently ingested food.  Patient currently denies any headache, chest pain, abdominal pain.  Per family he is also seemed more confused over the last few days than usual.  They note that he has had similar symptoms with urinary tract infections in the past, they are also concern for COVID-19 infection.  Denies any recent antibiotic use, denies any history of C. difficile.  Denies any suspicious ingestions.  Patient currently denying any abdominal pain.  He does note noticing a bulge in his groin earlier in the week.  Denies any pain radiating to his testicles or penis.              Patient History   Past Medical History:   Diagnosis Date    Anemia     Arthritis     BPH with obstruction/lower urinary tract symptoms     Cerebral vascular accident (Multi)     1996, 2016 w/ residual left sided weakness, CT Head 9/8/22  Large area of encephalomalacia of the right MCA territory, unchanged since prior. 3. Advanced chronic microvascular ischemic changes and chronic left basal ganglia lacunar infarct.    CKD (chronic kidney disease)     Cognitive decline     Dementia     on donzepril    Depression      Hearing aid worn     refuses to wear    HL (hearing loss)     Hypertension     OAB (overactive bladder)     PE (pulmonary thromboembolism) (Multi) 2018    off anticoags    Resting tremor     mild hands    RLS (restless legs syndrome)     Spinal stenosis     Unspecified urinary incontinence      Past Surgical History:   Procedure Laterality Date    CARPAL TUNNEL RELEASE Bilateral     CATARACT EXTRACTION Bilateral 2006    EXCISION / REPAIR HYDROCELE PEDIATRIC Left 2022    TRANSURETHRAL RESECTION OF PROSTATE  2016     Family History   Problem Relation Name Age of Onset    Diabetes Mother      Heart attack Mother      Cancer Father      Other (silicosis) Father      Heart attack Brother       Social History     Tobacco Use    Smoking status: Former     Current packs/day: 0.00     Types: Cigarettes     Quit date: 10/1/1992     Years since quittin.3    Smokeless tobacco: Never   Vaping Use    Vaping status: Never Used   Substance Use Topics    Alcohol use: Yes     Alcohol/week: 1.0 standard drink of alcohol     Types: 1 Cans of beer per week     Comment: occassional beer    Drug use: Not Currently       Physical Exam   ED Triage Vitals [25 1437]   Temperature Heart Rate Respirations BP   37.6 °C (99.6 °F) 79 18 120/86      Pulse Ox Temp Source Heart Rate Source Patient Position   99 % Temporal Monitor Sitting      BP Location FiO2 (%)     Left arm --       Physical Exam  Constitutional:       General: He is not in acute distress.     Appearance: He is ill-appearing (chronically ill appearing).   HENT:      Head: Normocephalic and atraumatic.      Jaw: There is normal jaw occlusion.   Eyes:      General: Lids are normal.      Extraocular Movements: Extraocular movements intact.      Conjunctiva/sclera: Conjunctivae normal.   Cardiovascular:      Rate and Rhythm: Normal rate and regular rhythm.      Pulses:           Radial pulses are 2+ on the right side and 2+ on the left side.   Pulmonary:       Effort: Pulmonary effort is normal.      Breath sounds: Examination of the right-lower field reveals rhonchi. Examination of the left-lower field reveals rhonchi. Rhonchi (faint) present. No decreased breath sounds, wheezing or rales.   Abdominal:      General: Abdomen is flat.      Palpations: Abdomen is soft.      Tenderness: There is abdominal tenderness (minimal) in the right lower quadrant, suprapubic area and left lower quadrant. There is no right CVA tenderness or left CVA tenderness. Negative signs include Regalado's sign and McBurney's sign.      Hernia: A hernia is present. Hernia is present in the left inguinal area (easily reducible, no overlying skin erythema/ warmth/ induration).   Musculoskeletal:      Cervical back: Full passive range of motion without pain, normal range of motion and neck supple.   Neurological:      General: No focal deficit present.      Mental Status: He is alert. He is disoriented.      GCS: GCS eye subscore is 4. GCS verbal subscore is 5. GCS motor subscore is 6.      Cranial Nerves: Cranial nerves 2-12 are intact.      Sensory: Sensation is intact.      Motor: Weakness (generalized) present. No abnormal muscle tone or pronator drift.      Comments: Oriented to self, location, disoriented to year.   Psychiatric:         Behavior: Behavior is cooperative.           ED Course & MDM   Diagnoses as of 02/06/25 1302   Generalized weakness   ALYSSIA (acute kidney injury) (CMS-[facility])   Atypical pneumonia                 No data recorded     Griffin Coma Scale Score: 15 (02/05/25 1439 : Alia Bellamy RN)                           Medical Decision Making  ECG interpreted by me: Sinus rhythm with first-degree AV block, QRS 96, rate 70, QTc 444.  No ST changes meeting STEMI criteria.    Patient presenting with generalized weakness, diarrhea, nausea.  Recent sick contact with COVID-19, family also notes similar symptoms with urinary tract infections in the past.  On examination patient is  chronically ill-appearing but in no acute distress, demonstrating reassuring vital signs.  Is oriented to self and place, disoriented to time.  Has no lateralizing strength/sensory deficit on examination to suggest ischemic CVA, will assess for intracranial hemorrhage/mass with CT head.  Also pursue infectious/metabolic workup with COVID-19 testing, urinalysis, chest x-ray.  Does appear to have a easily reducible left inguinal hernia, no evidence of incarceration/strangulation on examination but will assess for other acute abdominal pathology as well as evaluate the hernia with CT of the abdomen and pelvis, will obtain without contrast given CKD history.  Labs remarkable for elevated creatinine from baseline, will administer IV fluids, potassium within normal limits.  Mildly hyperchloremic, other electrolytes reassuring.  Patient did have a troponin elevation to 210, repeat troponin downtrending to 200, ECG showing no ischemic changes and patient denying any chest pain currently, patient has had elevated troponins in the 100-200s range on previous testing and this appears to be around his baseline, lowering my suspicion for an occlusive myocardial infarction.      Patient's x-ray shows no focal pneumonia and viral testing was negative, CT head negative, CT abdomen and pelvis showed no acute intra-abdominal process but does show findings concerning for atypical pneumonia over lower lung fields therefore ordered urine antigen testing and patient was started on IV antibiotics for community-acquired pneumonia coverage (ceftriaxone, doxycycline).  Given generalized weakness precluding safe ambulation at home patient was admitted to medicine, urinalysis was still pending at time of admission however did result while patient was in the ED, does show evidence of urinary tract infection which should be covered by ceftriaxone administered initially for CAP coverage.  Admitted in stable  condition.        Procedure  Procedures     Yaron Bethea MD  02/06/25 0979

## 2025-02-05 NOTE — Clinical Note
"Writer approached patient at the start of the shift. Pt identified that she had a hard day. Pt reported feeling fearful of trying Risperdal because it was a antipsychotic. Pt was reassured that she was ordered a very low dose and that staff would be monitoring her for any side effects. Pt stated \"I probably should try it\".     Pt calmer after receiving prn Risperdal.  Pt did voice feeling dizzy a short time after receiving this however patient was not hypotensive when vitals were checked. Pt did have a 15 point systolic drop after standing for one minute. Pt has also been encouraged to increase her fluid intake and to ask for assistance with ambulation if she feels unsteady.  Pt was able to sit thru art therapy this evening. Pt voiced concern over lack of friendships in the community. Pt initiated showering this evening. Pt's affect has been flat, however she has not been as tearful and distressed as previous shift.   " ED  Recommendation:  ED- Rec. (02/05/25 1913 : Andreia Baez RN)

## 2025-02-05 NOTE — ED TRIAGE NOTES
Patient presents to ED for fever, diarrhea, weakness, confusion and fatigue. Patient's daughter states he usually gets like this when he has a UTI. Patient was also exposed to COVID.

## 2025-02-06 ENCOUNTER — APPOINTMENT (OUTPATIENT)
Dept: CARDIOLOGY | Facility: HOSPITAL | Age: 85
End: 2025-02-06
Payer: MEDICARE

## 2025-02-06 LAB
ANION GAP SERPL CALC-SCNC: 11 MMOL/L (ref 10–20)
APPEARANCE UR: ABNORMAL
ATRIAL RATE: 70 BPM
BACTERIA #/AREA URNS AUTO: ABNORMAL /HPF
BILIRUB UR STRIP.AUTO-MCNC: NEGATIVE MG/DL
BUN SERPL-MCNC: 40 MG/DL (ref 6–23)
CALCIUM SERPL-MCNC: 8.4 MG/DL (ref 8.6–10.3)
CHLORIDE SERPL-SCNC: 107 MMOL/L (ref 98–107)
CO2 SERPL-SCNC: 26 MMOL/L (ref 21–32)
COLOR UR: ABNORMAL
CREAT SERPL-MCNC: 2.01 MG/DL (ref 0.5–1.3)
EGFRCR SERPLBLD CKD-EPI 2021: 32 ML/MIN/1.73M*2
ERYTHROCYTE [DISTWIDTH] IN BLOOD BY AUTOMATED COUNT: 14.9 % (ref 11.5–14.5)
GLUCOSE SERPL-MCNC: 103 MG/DL (ref 74–99)
GLUCOSE UR STRIP.AUTO-MCNC: NORMAL MG/DL
HCT VFR BLD AUTO: 35.6 % (ref 41–52)
HGB BLD-MCNC: 11.1 G/DL (ref 13.5–17.5)
KETONES UR STRIP.AUTO-MCNC: NEGATIVE MG/DL
LACTATE SERPL-SCNC: 1.3 MMOL/L (ref 0.4–2)
LEGIONELLA AG UR QL: NEGATIVE
LEUKOCYTE ESTERASE UR QL STRIP.AUTO: ABNORMAL
MCH RBC QN AUTO: 26.9 PG (ref 26–34)
MCHC RBC AUTO-ENTMCNC: 31.2 G/DL (ref 32–36)
MCV RBC AUTO: 86 FL (ref 80–100)
MUCOUS THREADS #/AREA URNS AUTO: ABNORMAL /LPF
NITRITE UR QL STRIP.AUTO: ABNORMAL
NRBC BLD-RTO: 0 /100 WBCS (ref 0–0)
P AXIS: -23 DEGREES
P OFFSET: 146 MS
P ONSET: 99 MS
PH UR STRIP.AUTO: 6.5 [PH]
PLATELET # BLD AUTO: 177 X10*3/UL (ref 150–450)
POTASSIUM SERPL-SCNC: 3.7 MMOL/L (ref 3.5–5.3)
PR INTERVAL: 228 MS
PROT UR STRIP.AUTO-MCNC: ABNORMAL MG/DL
Q ONSET: 213 MS
QRS COUNT: 12 BEATS
QRS DURATION: 96 MS
QT INTERVAL: 412 MS
QTC CALCULATION(BAZETT): 444 MS
QTC FREDERICIA: 433 MS
R AXIS: -11 DEGREES
RBC # BLD AUTO: 4.12 X10*6/UL (ref 4.5–5.9)
RBC # UR STRIP.AUTO: ABNORMAL MG/DL
RBC #/AREA URNS AUTO: ABNORMAL /HPF
S PNEUM AG UR QL: NEGATIVE
SODIUM SERPL-SCNC: 140 MMOL/L (ref 136–145)
SP GR UR STRIP.AUTO: 1.01
SQUAMOUS #/AREA URNS AUTO: ABNORMAL /HPF
T AXIS: -22 DEGREES
T OFFSET: 419 MS
UROBILINOGEN UR STRIP.AUTO-MCNC: NORMAL MG/DL
VENTRICULAR RATE: 70 BPM
WBC # BLD AUTO: 7.7 X10*3/UL (ref 4.4–11.3)
WBC #/AREA URNS AUTO: >50 /HPF

## 2025-02-06 PROCEDURE — 85027 COMPLETE CBC AUTOMATED: CPT | Performed by: NURSE PRACTITIONER

## 2025-02-06 PROCEDURE — 81001 URINALYSIS AUTO W/SCOPE: CPT | Performed by: NURSE PRACTITIONER

## 2025-02-06 PROCEDURE — 2500000004 HC RX 250 GENERAL PHARMACY W/ HCPCS (ALT 636 FOR OP/ED): Performed by: INTERNAL MEDICINE

## 2025-02-06 PROCEDURE — 80048 BASIC METABOLIC PNL TOTAL CA: CPT | Performed by: NURSE PRACTITIONER

## 2025-02-06 PROCEDURE — 87899 AGENT NOS ASSAY W/OPTIC: CPT | Mod: AHULAB | Performed by: EMERGENCY MEDICINE

## 2025-02-06 PROCEDURE — 87086 URINE CULTURE/COLONY COUNT: CPT | Mod: AHULAB | Performed by: NURSE PRACTITIONER

## 2025-02-06 PROCEDURE — 1100000001 HC PRIVATE ROOM DAILY

## 2025-02-06 PROCEDURE — 93005 ELECTROCARDIOGRAM TRACING: CPT

## 2025-02-06 PROCEDURE — 2500000001 HC RX 250 WO HCPCS SELF ADMINISTERED DRUGS (ALT 637 FOR MEDICARE OP): Performed by: NURSE PRACTITIONER

## 2025-02-06 PROCEDURE — 87449 NOS EACH ORGANISM AG IA: CPT | Mod: AHULAB | Performed by: EMERGENCY MEDICINE

## 2025-02-06 PROCEDURE — 2500000002 HC RX 250 W HCPCS SELF ADMINISTERED DRUGS (ALT 637 FOR MEDICARE OP, ALT 636 FOR OP/ED): Performed by: NURSE PRACTITIONER

## 2025-02-06 PROCEDURE — 36415 COLL VENOUS BLD VENIPUNCTURE: CPT | Performed by: NURSE PRACTITIONER

## 2025-02-06 PROCEDURE — 2500000001 HC RX 250 WO HCPCS SELF ADMINISTERED DRUGS (ALT 637 FOR MEDICARE OP): Performed by: INTERNAL MEDICINE

## 2025-02-06 PROCEDURE — 99222 1ST HOSP IP/OBS MODERATE 55: CPT | Performed by: INTERNAL MEDICINE

## 2025-02-06 PROCEDURE — 83605 ASSAY OF LACTIC ACID: CPT | Performed by: NURSE PRACTITIONER

## 2025-02-06 RX ORDER — NIFEDIPINE 30 MG/1
90 TABLET, FILM COATED, EXTENDED RELEASE ORAL DAILY
Status: DISPENSED | OUTPATIENT
Start: 2025-02-06

## 2025-02-06 RX ORDER — OXYBUTYNIN CHLORIDE 5 MG/1
5 TABLET ORAL 3 TIMES DAILY
Status: DISPENSED | OUTPATIENT
Start: 2025-02-06

## 2025-02-06 RX ORDER — POLYETHYLENE GLYCOL 3350 17 G/17G
17 POWDER, FOR SOLUTION ORAL DAILY PRN
Status: ACTIVE | OUTPATIENT
Start: 2025-02-06

## 2025-02-06 RX ORDER — ATORVASTATIN CALCIUM 20 MG/1
20 TABLET, FILM COATED ORAL NIGHTLY
Status: DISPENSED | OUTPATIENT
Start: 2025-02-06

## 2025-02-06 RX ORDER — DOCUSATE SODIUM 100 MG/1
100 CAPSULE, LIQUID FILLED ORAL DAILY
Status: DISPENSED | OUTPATIENT
Start: 2025-02-06

## 2025-02-06 RX ORDER — AZITHROMYCIN 500 MG/1
500 TABLET, FILM COATED ORAL EVERY 24 HOURS
Status: DISCONTINUED | OUTPATIENT
Start: 2025-02-07 | End: 2025-02-08

## 2025-02-06 RX ORDER — DONEPEZIL HYDROCHLORIDE 5 MG/1
10 TABLET, FILM COATED ORAL NIGHTLY
Status: DISPENSED | OUTPATIENT
Start: 2025-02-06

## 2025-02-06 RX ORDER — HYDRALAZINE HYDROCHLORIDE 20 MG/ML
5 INJECTION INTRAMUSCULAR; INTRAVENOUS EVERY 6 HOURS PRN
Status: ACTIVE | OUTPATIENT
Start: 2025-02-06

## 2025-02-06 RX ADMIN — DOCUSATE SODIUM 100 MG: 100 CAPSULE, LIQUID FILLED ORAL at 11:38

## 2025-02-06 RX ADMIN — OXYBUTYNIN CHLORIDE 5 MG: 5 TABLET ORAL at 11:38

## 2025-02-06 RX ADMIN — POLYETHYLENE GLYCOL 3350 17 G: 17 POWDER, FOR SOLUTION ORAL at 11:39

## 2025-02-06 RX ADMIN — OXYBUTYNIN CHLORIDE 5 MG: 5 TABLET ORAL at 21:20

## 2025-02-06 RX ADMIN — DONEPEZIL HYDROCHLORIDE 10 MG: 5 TABLET ORAL at 21:20

## 2025-02-06 RX ADMIN — AZITHROMYCIN MONOHYDRATE 500 MG: 500 INJECTION, POWDER, LYOPHILIZED, FOR SOLUTION INTRAVENOUS at 11:39

## 2025-02-06 RX ADMIN — CEFTRIAXONE SODIUM 1 G: 1 INJECTION, SOLUTION INTRAVENOUS at 21:20

## 2025-02-06 RX ADMIN — ATORVASTATIN CALCIUM 20 MG: 20 TABLET, FILM COATED ORAL at 21:20

## 2025-02-06 RX ADMIN — NIFEDIPINE 90 MG: 30 TABLET, FILM COATED, EXTENDED RELEASE ORAL at 11:39

## 2025-02-06 RX ADMIN — OXYBUTYNIN CHLORIDE 5 MG: 5 TABLET ORAL at 14:39

## 2025-02-06 RX ADMIN — PANTOPRAZOLE SODIUM 40 MG: 40 TABLET, DELAYED RELEASE ORAL at 11:38

## 2025-02-06 SDOH — ECONOMIC STABILITY: FOOD INSECURITY: WITHIN THE PAST 12 MONTHS, THE FOOD YOU BOUGHT JUST DIDN'T LAST AND YOU DIDN'T HAVE MONEY TO GET MORE.: NEVER TRUE

## 2025-02-06 SDOH — SOCIAL STABILITY: SOCIAL INSECURITY
WITHIN THE LAST YEAR, HAVE YOU BEEN KICKED, HIT, SLAPPED, OR OTHERWISE PHYSICALLY HURT BY YOUR PARTNER OR EX-PARTNER?: NO

## 2025-02-06 SDOH — ECONOMIC STABILITY: FOOD INSECURITY: WITHIN THE PAST 12 MONTHS, YOU WORRIED THAT YOUR FOOD WOULD RUN OUT BEFORE YOU GOT THE MONEY TO BUY MORE.: NEVER TRUE

## 2025-02-06 SDOH — SOCIAL STABILITY: SOCIAL INSECURITY
WITHIN THE LAST YEAR, HAVE YOU BEEN RAPED OR FORCED TO HAVE ANY KIND OF SEXUAL ACTIVITY BY YOUR PARTNER OR EX-PARTNER?: NO

## 2025-02-06 SDOH — SOCIAL STABILITY: SOCIAL INSECURITY: WITHIN THE LAST YEAR, HAVE YOU BEEN AFRAID OF YOUR PARTNER OR EX-PARTNER?: NO

## 2025-02-06 SDOH — SOCIAL STABILITY: SOCIAL INSECURITY: DO YOU FEEL ANYONE HAS EXPLOITED OR TAKEN ADVANTAGE OF YOU FINANCIALLY OR OF YOUR PERSONAL PROPERTY?: NO

## 2025-02-06 SDOH — ECONOMIC STABILITY: HOUSING INSECURITY: AT ANY TIME IN THE PAST 12 MONTHS, WERE YOU HOMELESS OR LIVING IN A SHELTER (INCLUDING NOW)?: NO

## 2025-02-06 SDOH — SOCIAL STABILITY: SOCIAL INSECURITY: HAVE YOU HAD ANY THOUGHTS OF HARMING ANYONE ELSE?: NO

## 2025-02-06 SDOH — SOCIAL STABILITY: SOCIAL INSECURITY: ABUSE: ADULT

## 2025-02-06 SDOH — SOCIAL STABILITY: SOCIAL INSECURITY: ARE YOU OR HAVE YOU BEEN THREATENED OR ABUSED PHYSICALLY, EMOTIONALLY, OR SEXUALLY BY ANYONE?: NO

## 2025-02-06 SDOH — SOCIAL STABILITY: SOCIAL INSECURITY: DOES ANYONE TRY TO KEEP YOU FROM HAVING/CONTACTING OTHER FRIENDS OR DOING THINGS OUTSIDE YOUR HOME?: NO

## 2025-02-06 SDOH — ECONOMIC STABILITY: INCOME INSECURITY: IN THE PAST 12 MONTHS HAS THE ELECTRIC, GAS, OIL, OR WATER COMPANY THREATENED TO SHUT OFF SERVICES IN YOUR HOME?: NO

## 2025-02-06 SDOH — SOCIAL STABILITY: SOCIAL INSECURITY: WITHIN THE LAST YEAR, HAVE YOU BEEN HUMILIATED OR EMOTIONALLY ABUSED IN OTHER WAYS BY YOUR PARTNER OR EX-PARTNER?: NO

## 2025-02-06 SDOH — ECONOMIC STABILITY: HOUSING INSECURITY: IN THE PAST 12 MONTHS, HOW MANY TIMES HAVE YOU MOVED WHERE YOU WERE LIVING?: 1

## 2025-02-06 SDOH — SOCIAL STABILITY: SOCIAL INSECURITY: HAVE YOU HAD THOUGHTS OF HARMING ANYONE ELSE?: NO

## 2025-02-06 SDOH — ECONOMIC STABILITY: FOOD INSECURITY
HOW HARD IS IT FOR YOU TO PAY FOR THE VERY BASICS LIKE FOOD, HOUSING, MEDICAL CARE, AND HEATING?: PATIENT UNABLE TO ANSWER

## 2025-02-06 SDOH — SOCIAL STABILITY: SOCIAL INSECURITY: DO YOU FEEL UNSAFE GOING BACK TO THE PLACE WHERE YOU ARE LIVING?: NO

## 2025-02-06 SDOH — SOCIAL STABILITY: SOCIAL INSECURITY: HAS ANYONE EVER THREATENED TO HURT YOUR FAMILY OR YOUR PETS?: NO

## 2025-02-06 SDOH — ECONOMIC STABILITY: HOUSING INSECURITY
IN THE LAST 12 MONTHS, WAS THERE A TIME WHEN YOU WERE NOT ABLE TO PAY THE MORTGAGE OR RENT ON TIME?: PATIENT UNABLE TO ANSWER

## 2025-02-06 SDOH — ECONOMIC STABILITY: TRANSPORTATION INSECURITY: IN THE PAST 12 MONTHS, HAS LACK OF TRANSPORTATION KEPT YOU FROM MEDICAL APPOINTMENTS OR FROM GETTING MEDICATIONS?: NO

## 2025-02-06 SDOH — SOCIAL STABILITY: SOCIAL INSECURITY: WERE YOU ABLE TO COMPLETE ALL THE BEHAVIORAL HEALTH SCREENINGS?: YES

## 2025-02-06 SDOH — SOCIAL STABILITY: SOCIAL INSECURITY: ARE THERE ANY APPARENT SIGNS OF INJURIES/BEHAVIORS THAT COULD BE RELATED TO ABUSE/NEGLECT?: NO

## 2025-02-06 ASSESSMENT — COGNITIVE AND FUNCTIONAL STATUS - GENERAL
DRESSING REGULAR UPPER BODY CLOTHING: A LITTLE
CLIMB 3 TO 5 STEPS WITH RAILING: TOTAL
WALKING IN HOSPITAL ROOM: A LOT
MOVING FROM LYING ON BACK TO SITTING ON SIDE OF FLAT BED WITH BEDRAILS: A LITTLE
HELP NEEDED FOR BATHING: A LOT
PATIENT BASELINE BEDBOUND: NO
STANDING UP FROM CHAIR USING ARMS: A LOT
DAILY ACTIVITIY SCORE: 16
MOVING TO AND FROM BED TO CHAIR: A LOT
TOILETING: A LOT
MOBILITY SCORE: 13
EATING MEALS: A LITTLE
DRESSING REGULAR LOWER BODY CLOTHING: A LITTLE
TURNING FROM BACK TO SIDE WHILE IN FLAT BAD: A LITTLE
PERSONAL GROOMING: A LITTLE

## 2025-02-06 ASSESSMENT — ACTIVITIES OF DAILY LIVING (ADL)
DRESSING YOURSELF: NEEDS ASSISTANCE
WALKS IN HOME: NEEDS ASSISTANCE
TOILETING: NEEDS ASSISTANCE
PATIENT'S MEMORY ADEQUATE TO SAFELY COMPLETE DAILY ACTIVITIES?: YES
BATHING: NEEDS ASSISTANCE
HEARING - LEFT EAR: FUNCTIONAL
FEEDING YOURSELF: NEEDS ASSISTANCE
LACK_OF_TRANSPORTATION: PATIENT UNABLE TO ANSWER
JUDGMENT_ADEQUATE_SAFELY_COMPLETE_DAILY_ACTIVITIES: YES
LACK_OF_TRANSPORTATION: NO
GROOMING: NEEDS ASSISTANCE
HEARING - RIGHT EAR: FUNCTIONAL
ADEQUATE_TO_COMPLETE_ADL: YES

## 2025-02-06 ASSESSMENT — LIFESTYLE VARIABLES
AUDIT-C TOTAL SCORE: 4
SKIP TO QUESTIONS 9-10: 0
HOW OFTEN DO YOU HAVE A DRINK CONTAINING ALCOHOL: 2-3 TIMES A WEEK
HOW OFTEN DO YOU HAVE 6 OR MORE DRINKS ON ONE OCCASION: NEVER
AUDIT-C TOTAL SCORE: 4
HOW MANY STANDARD DRINKS CONTAINING ALCOHOL DO YOU HAVE ON A TYPICAL DAY: 3 OR 4

## 2025-02-06 ASSESSMENT — ENCOUNTER SYMPTOMS: DIARRHEA: 1

## 2025-02-06 ASSESSMENT — PAIN - FUNCTIONAL ASSESSMENT: PAIN_FUNCTIONAL_ASSESSMENT: 0-10

## 2025-02-06 ASSESSMENT — PATIENT HEALTH QUESTIONNAIRE - PHQ9
1. LITTLE INTEREST OR PLEASURE IN DOING THINGS: NOT AT ALL
2. FEELING DOWN, DEPRESSED OR HOPELESS: NOT AT ALL
SUM OF ALL RESPONSES TO PHQ9 QUESTIONS 1 & 2: 0

## 2025-02-06 ASSESSMENT — PAIN SCALES - GENERAL: PAINLEVEL_OUTOF10: 0 - NO PAIN

## 2025-02-06 NOTE — SIGNIFICANT EVENT
Initial respiratory eval completed. Triage level 5. Pt on RA 96-97% no c/o dyspnea, no wheezing noted and per pt's daughter pt does not take any inhalers at home. OSKAR Sow CNP okay to change duoneb Q2 prn  at this time.

## 2025-02-06 NOTE — H&P
Marlo Carl is a 84 y.o. male   Weakness, Gen    Diarrhea        Patient with a past medical history of HTN, HLD, BPH with LUTS, CKD III. Lumbar Stenosis, Pulmonary Embolism, hx of CVA with Vascular Dementia, OAB, Incontinence, Parkinsonism was doing well until about a week ago when he was exposed to COVID at his house he was mildly symptomatic but this progressed to increasing weakness and lethargy along with nausea and vomiting and loss of appetite  Was brought to the emergency room where workup suggests possible pneumonia  I saw him this morning and he recognized me and was eating a pretty hefty breakfast without any difficulty  Denies any cough nausea vomiting or diarrhea to me today    Past Medical History  Past Medical History:   Diagnosis Date    Anemia     Arthritis     BPH with obstruction/lower urinary tract symptoms     Cerebral vascular accident (Multi)     1996, 2016 w/ residual left sided weakness, CT Head 9/8/22  Large area of encephalomalacia of the right MCA territory, unchanged since prior. 3. Advanced chronic microvascular ischemic changes and chronic left basal ganglia lacunar infarct.    CKD (chronic kidney disease)     Cognitive decline     Dementia     on donzepril    Depression     Hearing aid worn     refuses to wear    HL (hearing loss)     Hypertension     OAB (overactive bladder)     PE (pulmonary thromboembolism) (Multi) 09/07/2018    off anticoags    Resting tremor     mild hands    RLS (restless legs syndrome)     Spinal stenosis     Unspecified urinary incontinence        Surgical History  Past Surgical History:   Procedure Laterality Date    CARPAL TUNNEL RELEASE Bilateral     CATARACT EXTRACTION Bilateral 2006    EXCISION / REPAIR HYDROCELE PEDIATRIC Left 12/2022    TRANSURETHRAL RESECTION OF PROSTATE  2016        Social History  He reports that he quit smoking about 32 years ago. His smoking use included cigarettes. He has never used smokeless tobacco. He reports current alcohol  use of about 1.0 standard drink of alcohol per week. He reports that he does not currently use drugs.    Family History  Family History   Problem Relation Name Age of Onset    Diabetes Mother      Heart attack Mother      Cancer Father      Other (silicosis) Father      Heart attack Brother          Allergies  Patient has no known allergies.    Review of Systems   Gastrointestinal:  Positive for diarrhea.        Constitutional: Feeling poorly  Eyes: no blurred vision and no diplopia.   ENT: no hearing loss, no tinnitus, no earache, no sore throat, no hoarseness and no swollen glands in the neck.   Cardiovascular: no chest pain, no tightness or heavy pressure, no shortness of breath, no palpitations and no lower extremity edema.   Respiratory: no cough, wheezing or shortness of breath at rest or exertion    All other systems have been reviewed and are negative for complaint.     Vitals:    02/06/25 1627   BP: 148/80   Pulse: 70   Resp: 18   Temp: 36.7 °C (98 °F)   SpO2: 99%        Scheduled medications  atorvastatin, 20 mg, oral, Nightly  [START ON 2/7/2025] azithromycin, 500 mg, oral, q24h  cefTRIAXone, 1 g, intravenous, q24h  docusate sodium, 100 mg, oral, Daily  donepezil, 10 mg, oral, Nightly  NIFEdipine ER, 90 mg, oral, Daily  oxybutynin, 5 mg, oral, TID  pantoprazole, 40 mg, oral, Daily before breakfast      Continuous medications     PRN medications  PRN medications: acetaminophen, benzonatate, hydrALAZINE, ipratropium-albuteroL, melatonin, ondansetron, polyethylene glycol    Results from last 7 days   Lab Units 02/06/25  1459 02/05/25  1502   WBC AUTO x10*3/uL 7.7 11.3   HEMOGLOBIN g/dL 11.1* 12.6*   HEMATOCRIT % 35.6* 41.5   PLATELETS AUTO x10*3/uL 177 205     Results from last 7 days   Lab Units 02/06/25  1459 02/05/25  1502   SODIUM mmol/L 140 143   POTASSIUM mmol/L 3.7 4.1   CHLORIDE mmol/L 107 109*   CO2 mmol/L 26 23   BUN mg/dL 40* 43*   CREATININE mg/dL 2.01* 2.53*   CALCIUM mg/dL 8.4* 9.1   PROTEIN  TOTAL g/dL  --  7.9   BILIRUBIN TOTAL mg/dL  --  0.4   ALK PHOS U/L  --  76   ALT U/L  --  17   AST U/L  --  27   GLUCOSE mg/dL 103* 122*     Results from last 7 days   Lab Units 02/05/25  1643 02/05/25  1502   TROPHS ng/L 200* 210*        CT abdomen pelvis wo IV contrast   Final Result   Diffuse colonic diverticulosis.        Numerous bilateral hypoattenuating lesions suggestive of   hemorrhagic/proteinaceous cysts but incompletely characterized   without contrast.        Mild asymmetric left-sided bladder wall thickening, nonspecific.   Prostatomegaly with questionable TURP defect.        Right adrenal adenoma with stable left adrenal nodule.        Atherosclerosis with abdominal aortic aneurysm.        Patchy and nodular ground-glass opacities in the visualized lower   lungs suggesting atypical infection or pneumonitis.        Signed by: Xin Madsen 2/5/2025 6:21 PM   Dictation workstation:   [id number]      CT head wo IV contrast   Final Result   No evidence of acute cortical infarct or intracranial hemorrhage.        Stable intracranial findings since comparison head CT 09/08/2022.        MACRO:   None             Signed by: Dani Kingsley 2/5/2025 3:33 PM   Dictation workstation:   [id number]      XR chest 2 views   Final Result   No focal infiltrate or pneumothorax.        MACRO:   None.        Signed by: Eliot Caceres 2/5/2025 3:13 PM   Dictation workstation:   [id number]          Physical Exam      Constitutional   General appearance: Alert and in no acute distress.   Eyes   Inspection of eyes: Sclera and conjunctiva were normal.    Pupil exam: Pupils were equal in size. Extraocular movements were intact.   Pulmonary   Respiratory assessment: No respiratory distress, normal respiratory rhythm and effort.    Auscultation of Lungs: Clear bilateral breath sounds.   Cardiovascular   Auscultation of heart: Apical pulse normal, heart rate and rhythm normal, normal S1 and S2, no murmurs and no pericardial  rub.    Exam for edema: No peripheral edema.   Abdomen   Abdominal Exam: No bruits, normal bowel sounds, soft, non-tender, no abdominal mass palpated.    Liver and Spleen exam: No hepato-splenomegaly.   Musculoskeletal     Inspection/palpation of joints, bones and muscles: No joint swelling. Normal movement of all extremities.   Skin   Skin inspection: Normal skin color and pigmentation, normal skin turgor and no visible rash.   Neurologic   Cranial nerves: Nerves 2-12 were intact,  Psychiatric   Orientation: Oriented to person,   Mood and affect: Normal.      Assessment/Plan      #Atypical pneumonia versus walking pneumonia  Started IV antibiotics  Check Legionella strep pneumo and mycoplasma  Sputum cultures if he is able to give    #Acute kidney injury from dehydration  #Possible UTI  Continue IV Rocephin  And fluids pending urine cultures    #Elevated troponin  Flat trend likely demand ischemia from the infection    Hypertension  Hold medications for systolic blood pressure less than 120    #Dyslipidemia  Continue statins    #Dementia  Appears to be at his baseline

## 2025-02-06 NOTE — CARE PLAN
The patient's goals for the shift include  pt will remain safe and comfortable throughout the shift    The clinical goals for the shift include  pt remains safe and stable throughout the shift

## 2025-02-06 NOTE — PROGRESS NOTES
Pharmacy Medication History    Spoke to the patient daughter. She stated that she never started her dad on the Sinemet because he is very bad at taking pills and wasn't sure if there would be anyone to give the pills 3 times a day every day at the same time    Source of Information:     Additional concerns with the patient's PTA list.     The following updates were made to the Prior to Admission medication list:     Medications ADDED:     Medications CHANGED:    Medications REMOVED:   ASA  Medications NOT TAKING:   Sinemet    Allergy reviewed : Yes    Meds 2 Beds : Yes    Outpatient pharmacy confirmed and updated in chart :     Pharmacy name:     The list below reflectives the updated PTA list. Please review each medication in order reconciliation for additional clarification and justification.    Prior to Admission Medications   Prescriptions Last Dose Informant   NIFEdipine CC 90 mg 24 hr tablet 2/5/2025 Morning    Sig: Take 1 tablet (90 mg) by mouth once daily.   atorvastatin (Lipitor) 20 mg tablet 2/5/2025    Sig: Take 1 tablet (20 mg) by mouth once daily.   carbidopa-levodopa (Sinemet)  mg tablet     Sig: Start with half a tablet twice a day for one week. Then increase to half a tablet three times a day for one week. Then increase to 0.5 tablet in the morning, 0.5 tablet at lunch and a full tablet at bedtime for one week. Then increase to 0.5 tablet in the morning, a full tablet at lunch, and a full tablet at bedtime for one week. Then increase to a full tablet three times daily.   carbidopa-levodopa (Sinemet)  mg tablet Not Taking    Sig: Take 1 tablet by mouth 3 times a day. Do not fill before December 6, 2024.   Patient not taking: Reported on 2/6/2025   docusate sodium (Colace) 100 mg capsule     Sig: Take 1 capsule (100 mg) by mouth once daily.   donepezil (Aricept) 10 mg tablet     Sig: Take 1 tablet (10 mg) by mouth once daily at bedtime.   krill oil 500 mg capsule     Sig: Take 1 capsule  (500 mg) by mouth once daily.   melatonin 5 mg capsule     Sig: Take 1 capsule (5 mg) by mouth as needed at bedtime.   mirabegron (Myrbetriq) 50 mg tablet extended release 24 hr 24 hr tablet 2/5/2025 Morning    Sig: Take 1 tablet (50 mg) by mouth once daily.   multivit-minerals/folic acid (CENTRUM ADULTS ORAL)     Sig: Take 1 tablet by mouth once daily.   oxyCODONE (Roxicodone) 5 mg immediate release tablet     Sig: Take 1 tablet (5 mg) by mouth every 6 hours if needed for severe pain (7 - 10) for up to 8 doses.      Facility-Administered Medications: None       The list below reflectives the updated allergy list. Please review each documented allergy for additional clarification and justification.    No Known Allergies       02/06/25 at 9:11 AM - [facility]

## 2025-02-06 NOTE — PROGRESS NOTES
Transitional Care Coordination Progress Note:  Plan per Medical/Surgical team: treatment of fever, diarrhea, weakness, confusion and fatigue with IV ATB & IV fluids  Status: Inpatient   Payor source: [facility]  Discharge disposition: Home with daughter  Potential Barriers: renal 43/2.53, trop 210, 200, lactate 2.6 to 1.3  ADOD: 2/8/2025  EUNICE Angeles RN, BSN Transitional Care Coordinator ED# [phone]      02/06/25 0757   Discharge Planning   Living Arrangements Children   Support Systems Children   Assistance Needed IV ATB  IV fluids   Type of Residence Private residence   Number of Stairs to Enter Residence 3   Number of Stairs Within Residence 14   Home or Post Acute Services None   Expected Discharge Disposition Home   Does the patient need discharge transport arranged? No   Financial Resource Strain   How hard is it for you to pay for the very basics like food, housing, medical care, and heating? Pt Unable   Housing Stability   In the last 12 months, was there a time when you were not able to pay the mortgage or rent on time? Pt Unable   In the past 12 months, how many times have you moved where you were living? 1   At any time in the past 12 months, were you homeless or living in a shelter (including now)? Pt Unable   Transportation Needs   In the past 12 months, has lack of transportation kept you from medical appointments or from getting medications? Pt Unable   In the past 12 months, has lack of transportation kept you from meetings, work, or from getting things needed for daily living? Pt Unable   Stroke Family Assessment   Stroke Family Assessment Needed No   Intensity of Service   Intensity of Service 0-30 min

## 2025-02-07 LAB
ANION GAP SERPL CALC-SCNC: 11 MMOL/L (ref 10–20)
APPEARANCE UR: ABNORMAL
BACTERIA UR CULT: NORMAL
BILIRUB UR STRIP.AUTO-MCNC: NEGATIVE MG/DL
BUN SERPL-MCNC: 36 MG/DL (ref 6–23)
CALCIUM SERPL-MCNC: 8.1 MG/DL (ref 8.6–10.3)
CARDIAC TROPONIN I PNL SERPL HS: 121 NG/L (ref 0–20)
CHLORIDE SERPL-SCNC: 109 MMOL/L (ref 98–107)
CO2 SERPL-SCNC: 23 MMOL/L (ref 21–32)
COLOR UR: ABNORMAL
CREAT SERPL-MCNC: 1.98 MG/DL (ref 0.5–1.3)
EGFRCR SERPLBLD CKD-EPI 2021: 32 ML/MIN/1.73M*2
ERYTHROCYTE [DISTWIDTH] IN BLOOD BY AUTOMATED COUNT: 14.6 % (ref 11.5–14.5)
GLUCOSE SERPL-MCNC: 91 MG/DL (ref 74–99)
GLUCOSE UR STRIP.AUTO-MCNC: NORMAL MG/DL
HCT VFR BLD AUTO: 32.5 % (ref 41–52)
HGB BLD-MCNC: 10.6 G/DL (ref 13.5–17.5)
HOLD SPECIMEN: NORMAL
KETONES UR STRIP.AUTO-MCNC: NEGATIVE MG/DL
LEUKOCYTE ESTERASE UR QL STRIP.AUTO: ABNORMAL
MCH RBC QN AUTO: 27.4 PG (ref 26–34)
MCHC RBC AUTO-ENTMCNC: 32.6 G/DL (ref 32–36)
MCV RBC AUTO: 84 FL (ref 80–100)
NITRITE UR QL STRIP.AUTO: NEGATIVE
NRBC BLD-RTO: 0 /100 WBCS (ref 0–0)
PH UR STRIP.AUTO: 6 [PH]
PLATELET # BLD AUTO: 176 X10*3/UL (ref 150–450)
POTASSIUM SERPL-SCNC: 3.7 MMOL/L (ref 3.5–5.3)
PROT UR STRIP.AUTO-MCNC: ABNORMAL MG/DL
RBC # BLD AUTO: 3.87 X10*6/UL (ref 4.5–5.9)
RBC # UR STRIP.AUTO: ABNORMAL MG/DL
RBC #/AREA URNS AUTO: NORMAL /HPF
SODIUM SERPL-SCNC: 139 MMOL/L (ref 136–145)
SP GR UR STRIP.AUTO: 1.02
UROBILINOGEN UR STRIP.AUTO-MCNC: NORMAL MG/DL
WBC # BLD AUTO: 4.8 X10*3/UL (ref 4.4–11.3)
WBC #/AREA URNS AUTO: NORMAL /HPF

## 2025-02-07 PROCEDURE — 86738 MYCOPLASMA ANTIBODY: CPT

## 2025-02-07 PROCEDURE — 2500000002 HC RX 250 W HCPCS SELF ADMINISTERED DRUGS (ALT 637 FOR MEDICARE OP, ALT 636 FOR OP/ED): Performed by: NURSE PRACTITIONER

## 2025-02-07 PROCEDURE — 1100000001 HC PRIVATE ROOM DAILY

## 2025-02-07 PROCEDURE — 2500000005 HC RX 250 GENERAL PHARMACY W/O HCPCS: Performed by: INTERNAL MEDICINE

## 2025-02-07 PROCEDURE — 99232 SBSQ HOSP IP/OBS MODERATE 35: CPT

## 2025-02-07 PROCEDURE — 80048 BASIC METABOLIC PNL TOTAL CA: CPT | Performed by: NURSE PRACTITIONER

## 2025-02-07 PROCEDURE — 99232 SBSQ HOSP IP/OBS MODERATE 35: CPT | Performed by: INTERNAL MEDICINE

## 2025-02-07 PROCEDURE — 2500000004 HC RX 250 GENERAL PHARMACY W/ HCPCS (ALT 636 FOR OP/ED): Performed by: INTERNAL MEDICINE

## 2025-02-07 PROCEDURE — 2500000001 HC RX 250 WO HCPCS SELF ADMINISTERED DRUGS (ALT 637 FOR MEDICARE OP): Performed by: PHARMACIST

## 2025-02-07 PROCEDURE — 36415 COLL VENOUS BLD VENIPUNCTURE: CPT

## 2025-02-07 PROCEDURE — 97161 PT EVAL LOW COMPLEX 20 MIN: CPT | Mod: GP | Performed by: PHYSICAL THERAPIST

## 2025-02-07 PROCEDURE — 85027 COMPLETE CBC AUTOMATED: CPT | Performed by: NURSE PRACTITIONER

## 2025-02-07 PROCEDURE — 87086 URINE CULTURE/COLONY COUNT: CPT | Mod: AHULAB | Performed by: INTERNAL MEDICINE

## 2025-02-07 PROCEDURE — 97165 OT EVAL LOW COMPLEX 30 MIN: CPT | Mod: GO

## 2025-02-07 PROCEDURE — 2500000001 HC RX 250 WO HCPCS SELF ADMINISTERED DRUGS (ALT 637 FOR MEDICARE OP): Performed by: NURSE PRACTITIONER

## 2025-02-07 PROCEDURE — 36415 COLL VENOUS BLD VENIPUNCTURE: CPT | Performed by: NURSE PRACTITIONER

## 2025-02-07 PROCEDURE — 2500000001 HC RX 250 WO HCPCS SELF ADMINISTERED DRUGS (ALT 637 FOR MEDICARE OP): Performed by: INTERNAL MEDICINE

## 2025-02-07 PROCEDURE — 2500000004 HC RX 250 GENERAL PHARMACY W/ HCPCS (ALT 636 FOR OP/ED)

## 2025-02-07 PROCEDURE — 81001 URINALYSIS AUTO W/SCOPE: CPT | Performed by: INTERNAL MEDICINE

## 2025-02-07 PROCEDURE — 92610 EVALUATE SWALLOWING FUNCTION: CPT | Mod: GN | Performed by: SPEECH-LANGUAGE PATHOLOGIST

## 2025-02-07 PROCEDURE — 97535 SELF CARE MNGMENT TRAINING: CPT | Mod: GO

## 2025-02-07 PROCEDURE — 84484 ASSAY OF TROPONIN QUANT: CPT

## 2025-02-07 RX ORDER — HEPARIN SODIUM 5000 [USP'U]/ML
5000 INJECTION, SOLUTION INTRAVENOUS; SUBCUTANEOUS EVERY 8 HOURS
Status: DISPENSED | OUTPATIENT
Start: 2025-02-07

## 2025-02-07 RX ADMIN — NIFEDIPINE 90 MG: 30 TABLET, FILM COATED, EXTENDED RELEASE ORAL at 08:14

## 2025-02-07 RX ADMIN — PANTOPRAZOLE SODIUM 40 MG: 40 TABLET, DELAYED RELEASE ORAL at 06:25

## 2025-02-07 RX ADMIN — AZITHROMYCIN 500 MG: 500 TABLET, FILM COATED ORAL at 08:15

## 2025-02-07 RX ADMIN — DONEPEZIL HYDROCHLORIDE 10 MG: 5 TABLET ORAL at 22:33

## 2025-02-07 RX ADMIN — CEFTRIAXONE SODIUM 1 G: 1 INJECTION, SOLUTION INTRAVENOUS at 22:36

## 2025-02-07 RX ADMIN — HEPARIN SODIUM 5000 UNITS: 5000 INJECTION, SOLUTION INTRAVENOUS; SUBCUTANEOUS at 22:34

## 2025-02-07 RX ADMIN — OXYBUTYNIN CHLORIDE 5 MG: 5 TABLET ORAL at 22:33

## 2025-02-07 RX ADMIN — HEPARIN SODIUM 5000 UNITS: 5000 INJECTION, SOLUTION INTRAVENOUS; SUBCUTANEOUS at 15:20

## 2025-02-07 RX ADMIN — OXYBUTYNIN CHLORIDE 5 MG: 5 TABLET ORAL at 08:14

## 2025-02-07 RX ADMIN — Medication 3 MG: at 00:44

## 2025-02-07 RX ADMIN — ATORVASTATIN CALCIUM 20 MG: 20 TABLET, FILM COATED ORAL at 22:33

## 2025-02-07 RX ADMIN — OXYBUTYNIN CHLORIDE 5 MG: 5 TABLET ORAL at 15:20

## 2025-02-07 ASSESSMENT — COGNITIVE AND FUNCTIONAL STATUS - GENERAL
MOBILITY SCORE: 15
HELP NEEDED FOR BATHING: A LITTLE
TURNING FROM BACK TO SIDE WHILE IN FLAT BAD: A LOT
STANDING UP FROM CHAIR USING ARMS: A LOT
WALKING IN HOSPITAL ROOM: A LITTLE
STANDING UP FROM CHAIR USING ARMS: A LITTLE
CLIMB 3 TO 5 STEPS WITH RAILING: A LOT
DAILY ACTIVITIY SCORE: 18
TOILETING: A LITTLE
CLIMB 3 TO 5 STEPS WITH RAILING: A LOT
DAILY ACTIVITIY SCORE: 15
WALKING IN HOSPITAL ROOM: A LOT
CLIMB 3 TO 5 STEPS WITH RAILING: A LOT
DRESSING REGULAR UPPER BODY CLOTHING: A LITTLE
TOILETING: A LITTLE
DRESSING REGULAR UPPER BODY CLOTHING: A LITTLE
MOVING TO AND FROM BED TO CHAIR: A LOT
WALKING IN HOSPITAL ROOM: A LOT
STANDING UP FROM CHAIR USING ARMS: A LOT
EATING MEALS: A LITTLE
DRESSING REGULAR UPPER BODY CLOTHING: A LITTLE
MOVING FROM LYING ON BACK TO SITTING ON SIDE OF FLAT BED WITH BEDRAILS: A LITTLE
HELP NEEDED FOR BATHING: A LOT
TURNING FROM BACK TO SIDE WHILE IN FLAT BAD: A LITTLE
TOILETING: A LOT
DRESSING REGULAR LOWER BODY CLOTHING: A LITTLE
PERSONAL GROOMING: A LITTLE
MOBILITY SCORE: 14
MOVING FROM LYING ON BACK TO SITTING ON SIDE OF FLAT BED WITH BEDRAILS: A LITTLE
DRESSING REGULAR LOWER BODY CLOTHING: A LOT
HELP NEEDED FOR BATHING: A LITTLE
PERSONAL GROOMING: A LITTLE
EATING MEALS: A LITTLE
MOBILITY SCORE: 14
DAILY ACTIVITIY SCORE: 18
MOVING FROM LYING ON BACK TO SITTING ON SIDE OF FLAT BED WITH BEDRAILS: A LITTLE
MOVING TO AND FROM BED TO CHAIR: A LOT
MOVING TO AND FROM BED TO CHAIR: A LOT
PERSONAL GROOMING: A LITTLE
EATING MEALS: A LITTLE
TURNING FROM BACK TO SIDE WHILE IN FLAT BAD: A LITTLE
DRESSING REGULAR LOWER BODY CLOTHING: A LITTLE

## 2025-02-07 ASSESSMENT — PAIN SCALES - GENERAL
PAINLEVEL_OUTOF10: 0 - NO PAIN

## 2025-02-07 ASSESSMENT — ACTIVITIES OF DAILY LIVING (ADL)
HOME_MANAGEMENT_TIME_ENTRY: 15
ADL_ASSISTANCE: NEEDS ASSISTANCE

## 2025-02-07 ASSESSMENT — PAIN - FUNCTIONAL ASSESSMENT
PAIN_FUNCTIONAL_ASSESSMENT: 0-10

## 2025-02-07 NOTE — PROGRESS NOTES
Medicine NP follow up note    Subjective: He is in bed, getting up to work with OT.  He is oriented to self, that he is at [facility], does not know which specific hospital, but does state he is in [place].  Endorses that he is here for pneumonia, redirectable.  Disoriented to time and place.  Without any complaints at this time.  He does report cough with food, but SLP saw him today and cleared for diet.    Additional information: HDS overnight, Cre has improved to baseline.  Some decrease in hgb to 10.6 but stable, BMP is not concerning, trop continues to decrease.  Waiting for PT/OT to see him today.  Still without diarrhea.        Vitals (Last 24 Hours):  Heart Rate:  [61-75]   Temp:  [3.8 °C (38.8 °F)-36.9 °C (98.5 °F)]   Resp:  [14-20]   BP: (122-185)/(68-87)   SpO2:  [95 %-100 %]       I have reviewed imaging reports and labs from this visit    PHYSICAL EXAM:  Constitutional: NAD, alert and cooperative.  Oriented to self, to situation, inattentive but easy to redirect.  Disoriented to date.  Eyes: no icterus  ENMT: mucous membranes moist, no lesions  Head/Neck: supple  Respiratory/Thorax: Diminished lung sounds in posterior upper and mid bilateral lobes, clear bilateral anterior lobes, non-labored breathing, reports cough, on RA  Cardiovascular: RRR, no murmurs heard  Gastrointestinal: ND/S/NT  : no Samano, no SP/flank discomfort  Musculoskeletal: no joint swelling, ROM intact  Extremities: no edema  Neurological: non-focal  Skin: warm and dry  Psych: calm, stable mood     Results for orders placed or performed during the hospital encounter of 02/05/25 (from the past 24 hours)   Basic metabolic panel   Result Value Ref Range    Glucose 103 (H) 74 - 99 mg/dL    Sodium 140 136 - 145 mmol/L    Potassium 3.7 3.5 - 5.3 mmol/L    Chloride 107 98 - 107 mmol/L    Bicarbonate 26 21 - 32 mmol/L    Anion Gap 11 10 - 20 mmol/L    Urea Nitrogen 40 (H) 6 - 23 mg/dL    Creatinine 2.01 (H) 0.50 - 1.30 mg/dL    eGFR 32  (L) >60 mL/min/1.73m*2    Calcium 8.4 (L) 8.6 - 10.3 mg/dL   CBC   Result Value Ref Range    WBC 7.7 4.4 - 11.3 x10*3/uL    nRBC 0.0 0.0 - 0.0 /100 WBCs    RBC 4.12 (L) 4.50 - 5.90 x10*6/uL    Hemoglobin 11.1 (L) 13.5 - 17.5 g/dL    Hematocrit 35.6 (L) 41.0 - 52.0 %    MCV 86 80 - 100 fL    MCH 26.9 26.0 - 34.0 pg    MCHC 31.2 (L) 32.0 - 36.0 g/dL    RDW 14.9 (H) 11.5 - 14.5 %    Platelets 177 150 - 450 x10*3/uL   CBC   Result Value Ref Range    WBC 4.8 4.4 - 11.3 x10*3/uL    nRBC 0.0 0.0 - 0.0 /100 WBCs    RBC 3.87 (L) 4.50 - 5.90 x10*6/uL    Hemoglobin 10.6 (L) 13.5 - 17.5 g/dL    Hematocrit 32.5 (L) 41.0 - 52.0 %    MCV 84 80 - 100 fL    MCH 27.4 26.0 - 34.0 pg    MCHC 32.6 32.0 - 36.0 g/dL    RDW 14.6 (H) 11.5 - 14.5 %    Platelets 176 150 - 450 x10*3/uL   Basic Metabolic Panel   Result Value Ref Range    Glucose 91 74 - 99 mg/dL    Sodium 139 136 - 145 mmol/L    Potassium 3.7 3.5 - 5.3 mmol/L    Chloride 109 (H) 98 - 107 mmol/L    Bicarbonate 23 21 - 32 mmol/L    Anion Gap 11 10 - 20 mmol/L    Urea Nitrogen 36 (H) 6 - 23 mg/dL    Creatinine 1.98 (H) 0.50 - 1.30 mg/dL    eGFR 32 (L) >60 mL/min/1.73m*2    Calcium 8.1 (L) 8.6 - 10.3 mg/dL   Troponin I, High Sensitivity   Result Value Ref Range    Troponin I, High Sensitivity 121 (HH) 0 - 20 ng/L     *Note: Due to a large number of results and/or encounters for the requested time period, some results have not been displayed. A complete set of results can be found in Results Review.         MEDS:  Scheduled meds  atorvastatin, 20 mg, oral, Nightly  azithromycin, 500 mg, oral, q24h  cefTRIAXone, 1 g, intravenous, q24h  docusate sodium, 100 mg, oral, Daily  donepezil, 10 mg, oral, Nightly  heparin (porcine), 5,000 Units, subcutaneous, q8h  NIFEdipine ER, 90 mg, oral, Daily  oxybutynin, 5 mg, oral, TID  pantoprazole, 40 mg, oral, Daily before breakfast        Continuous meds       PRN meds  PRN medications: acetaminophen, benzonatate, hydrALAZINE,  ipratropium-albuteroL, melatonin, ondansetron, polyethylene glycol      ASSESSMENT/PLAN:    Marlo Carl is a 84 year old male with a PMH of HTN, HLD, BPH with LUTS, CKD III. Lumbar Stenosis, Pulmonary Embolism, hx of CVA with Vascular Dementia, OAB, Incontinence, Parkinsonism was doing well until about a week ago when he was exposed to COVID at his house he was mildly symptomatic but this progressed to increasing weakness and lethargy along with nausea and vomiting and loss of appetite    Was brought to the emergency room where workup suggests possible pneumonia, possible ALYSSIA and UTI    #Atypical pneumonia versus walking pneumonia  Assesment:  -No SOB  -Afebrile, no white count on admission  -CXR without focal infiltrate or pneumo,   -CT A/P suggestive of atypical infection or pneumonia  -Endorses cough when eating  -Was started on IV antibiotics  -legionella and strep negative  Plan:  -Mycoplasma pending  -Sputum cultures if he is able to give  -Continue IV azithro and ceftriaxone   -SLP consult   :Upright for all PO intake   :small bites/sips   :straws ok   :slow rate of consumption  ' :pills ok    #Acute kidney injury from dehydration  #Possible UTI  Assessment:  -Cre elevated on admission (2.53, BL 1.96), pre renal ratio  -Cre down trend 1.98  -No leukocytosis, UA with 500 leuk esterase  -No dysuria, frequency, or other alarming urinary symptoms  -Has been on IV Rocephin  -UC NGTD  Plan:  -CTM kidney function  -Avoid nephrotoxic agents     #Elevated troponin  -Elevated and Flat trend likely demand ischemia from the infection, 210-200-121   -EKG without concerns for acute ischemia    Hypertension  -BP acceptable this admission  -Hold medications for systolic blood pressure less than 120     #Dyslipidemia  -Continue statins     #Dementia  -CT head no acute infarct or intracranial hemorrhage   -Appears to be at his baseline    DVT Prophylaxis  -subq heparin    DC Disposition  -PT/OT pending    Other comorbidities  as above  -continue medications as ordered and adjust based on clinical course     Discussed with Dr. George Crain and the interdisciplinary team     Devin Stewart, [facility]-CNP

## 2025-02-07 NOTE — CARE PLAN
Problem: Pain - Adult  Goal: Verbalizes/displays adequate comfort level or baseline comfort level  Outcome: Progressing     Problem: Safety - Adult  Goal: Free from fall injury  Outcome: Progressing     Problem: Discharge Planning  Goal: Discharge to home or other facility with appropriate resources  Outcome: Progressing     Problem: Chronic Conditions and Co-morbidities  Goal: Patient's chronic conditions and co-morbidity symptoms are monitored and maintained or improved  Outcome: Progressing     Problem: Nutrition  Goal: Nutrient intake appropriate for maintaining nutritional needs  Outcome: Progressing   The patient's goals for the shift include: rest and management of needs.       The clinical goals for the shift include : Patient will remain free from falls and unintentional injuries.

## 2025-02-07 NOTE — PROGRESS NOTES
Occupational Therapy    Evaluation/Treatment    Patient Name: Marlo Carl  MRN: [id number]  Department: [facility] A 5  Room: [address] [place] [address]-  Today's Date: 02/07/25  Time Calculation  Start Time: 1024  Stop Time: 1056  Time Calculation (min): 32 min       Assessment:  OT Assessment: Pt presents with reduced balance, endurance, safety awareness and strength, impeding ADL performance and functional mobility. Pt would benefit from skilled OT services to address these deficits and facilitate highest level of function.  Prognosis: Good  Barriers to Discharge Home: Caregiver assistance, Cognition needs, Physical needs  Caregiver Assistance: Caregiver assistance needed per identified barriers - however, level of patient's required assistance exceeds assistance available at home  Cognition Needs: Insight of patient limited regarding functional ability/needs, 24hr supervision for safety awareness needed  Physical Needs: 24hr mobility assistance needed, 24hr ADL assistance needed, High falls risk due to function or environment  Evaluation/Treatment Tolerance: Patient limited by fatigue  Medical Staff Made Aware: Yes  End of Session Communication: Bedside nurse  End of Session Patient Position: Up in chair, Alarm on  OT Assessment Results: Decreased ADL status, Decreased upper extremity range of motion, Decreased safe judgment during ADL, Decreased cognition, Decreased endurance, Decreased fine motor control, Decreased functional mobility, Decreased IADLs  Prognosis: Good  Barriers to Discharge: Inaccessible home environment  Evaluation/Treatment Tolerance: Patient limited by fatigue  Medical Staff Made Aware: Yes  Strengths: Ability to acquire knowledge, Attitude of self, Premorbid level of function, Rehab experience, Support and attitude of living partners, Support of extended family/friends  Barriers to Participation: Comorbidities, Housing layout  Plan:  Treatment Interventions: ADL retraining, Functional transfer training, UE  strengthening/ROM, Endurance training, Cognitive reorientation, Patient/family training, Equipment evaluation/education, Fine motor coordination activities, Compensatory technique education  OT Frequency: 3 times per week  OT Discharge Recommendations: Moderate intensity level of continued care  OT Recommended Transfer Status: Assist of 1, Assist of 2  OT - OK to Discharge: Yes (Per POC)  Treatment Interventions: ADL retraining, Functional transfer training, UE strengthening/ROM, Endurance training, Cognitive reorientation, Patient/family training, Equipment evaluation/education, Fine motor coordination activities, Compensatory technique education    Subjective     General:   OT Received On: 02/07/25  General  Reason for Referral: 85 y/o M to ED with weakness, N/V, and lethargy following Covid exposure (vs infection?)  Referred By: Kaci Prince, APRN-CNP  Past Medical History Relevant to Rehab:   Past Medical History:   Diagnosis Date    Anemia     Arthritis     BPH with obstruction/lower urinary tract symptoms     Cerebral vascular accident (Multi)     1996, 2016 w/ residual left sided weakness, CT Head 9/8/22  Large area of encephalomalacia of the right MCA territory, unchanged since prior. 3. Advanced chronic microvascular ischemic changes and chronic left basal ganglia lacunar infarct.    CKD (chronic kidney disease)     Cognitive decline     Dementia     on donzepril    Depression     Hearing aid worn     refuses to wear    HL (hearing loss)     Hypertension     OAB (overactive bladder)     PE (pulmonary thromboembolism) (Multi) 09/07/2018    off anticoags    Resting tremor     mild hands    RLS (restless legs syndrome)     Spinal stenosis     Unspecified urinary incontinence      Family/Caregiver Present: No  Prior to Session Communication: Bedside nurse, PCT/NA/CTA  Patient Position Received: Bed, 3 rail up, Alarm on  Preferred Learning Style: auditory, verbal, visual  General Comment: Pt pleasant and  agreeable to OT eval.   Precautions:  Medical Precautions: Fall precautions            Pain:  Pain Assessment  Pain Assessment: 0-10  0-10 (Numeric) Pain Score: 0 - No pain    Objective   Cognition:  Overall Cognitive Status:  (Mild confusion)  Orientation Level: Disoriented to time, Disoriented to situation  Attention: Within Functional Limits  Problem Solving: Exceptions to WFL  Safety/Judgement: Exceptions to WFL  Insight: Mild  Impulsive: Mildly           Home Living:  Type of Home: House  Lives With: Spouse, Adult children, Grandchildren (Wife and 2 granddaughters)  Home Adaptive Equipment: Walker rolling or standard, Cane (Rollator)  Home Layout: Two level, Bed/bath upstairs, Stairs to alternate level with rails  Alternate Level Stairs-Rails:  (unilateral; had bilateral rails but tone rail came loose)  Alternate Level Stairs-Number of Steps: 12  Home Access: Stairs to enter with rails  Entrance Stairs-Rails: Both  Entrance Stairs-Number of Steps: 4  Bathroom Shower/Tub: Tub/shower unit  Bathroom Toilet: Standard  Bathroom Equipment: Shower chair with back  Prior Function:  Level of [place]: Needs assistance with ADLs, Needs assistance with homemaking  Receives Help From: Family  ADL Assistance: Needs assistance (Pt reports assist with showering and dressing (intermittent UB depending on type of shirt).)  Homemaking Assistance: Needs assistance (Family completes iADLs)  Ambulatory Assistance: Independent (With FWW)  Transfers:  (Pt reports assist with shower transfers)  Prior Function Comments: Pt denies recent falls     Activities of Daily Living: UE Dressing  UE Dressing Level of Assistance: Minimum assistance  UE Dressing Where Assessed: Chair  UE Dressing Comments: [facility]    LE Dressing  LE Dressing: Yes  Sock Level of Assistance: Minimum assistance (Able to doff BLE socks; able to don RLE sock, Min A to don LLE sock)  LE Dressing Where Assessed: Chair    Toileting  Toileting Level  of Assistance: Maximum assistance  Where Assessed: Bedside commode  Toileting Comments: Assist with pericare hygiene  Activity Tolerance:  Endurance: Tolerates 10 - 20 min exercise with multiple rests  Functional Standing Tolerance:  Functional Standing Tolerance Comments: ~1-2 minutes.  Bed Mobility/Transfers: Bed Mobility  Bed Mobility: Yes  Bed Mobility 1  Bed Mobility 1: Supine to sitting  Level of Assistance 1: Minimum assistance  Bed Mobility Comments 1: HOB elevated. Cues for sequencing. Increased time to complete.    Transfers  Transfer: Yes  Transfer 1  Transfer From 1: Sit to  Transfer to 1: Stand  Technique 1: Sit to stand, Stand to sit  Transfer Device 1: Walker, Gait belt  Transfer Level of Assistance 1: Moderate assistance, Minimum assistance  Trials/Comments 1: x3 trials, initial 2/3 trials Mod A, able to progress to Min A. Verbal/tactile cues for hand placement.  Transfers 2  Transfer From 2: Stand to  Transfer to 2: Commode-standard  Technique 2: Stand pivot  Transfer Device 2: Walker, Gait belt  Transfer Level of Assistance 2: Moderate assistance  Trials/Comments 2: Cues required for advancing BLE, sequencing and walker management.  Transfers 3  Transfer From 3: Commode-standard to  Transfer to 3: Stand  Technique 3: Sit to stand, Stand to sit  Transfer Device 3: Walker, Gait belt  Transfer Level of Assistance 3: Minimum assistance  Trials/Comments 3: Verbal/tactile cues for hand placement prior to sitting/standing, Min A for steadying and balance.  Transfers 4  Transfer From 4: Stand to  Transfer to 4: Chair with arms  Technique 4: Stand pivot, Lateral (Lateral side steps from commode>chair)  Transfer Device 4: Walker, Gait belt  Transfer Level of Assistance 4: Moderate assistance (x1-2)  Trials/Comments 4: Cues required for advancing BLE, sequencing and walker management. Pt demo unsteadiness and LE fatigue, requiring Mod Ax2 to chair.     Sitting Balance:  Static Sitting Balance  Static  Sitting-Balance Support: Feet supported  Static Sitting-Level of Assistance: Close supervision  Static Sitting-Comment/Number of Minutes: EOB  Standing Balance:  Static Standing Balance  Static Standing-Balance Support: Bilateral upper extremity supported  Static Standing-Level of Assistance: Minimum assistance, Contact guard  Static Standing-Comment/Number of Minutes: With FWW  Dynamic Standing Balance  Dynamic Standing-Balance Support: Bilateral upper extremity supported  Dynamic Standing-Level of Assistance: Moderate assistance  Dynamic Standing-Balance: Turning  Dynamic Standing-Comments: With FWW    Therapy/Activity: Therapeutic Activity  Therapeutic Activity Performed: Yes  Therapeutic Activity 1: Pt completed multiple sit<>stand transfers from bed and bedside commode.     Vision:Vision - Basic Assessment  Current Vision: No visual deficits  Sensation:  Light Touch: No apparent deficits  Strength:  Strength Comments: Limited bilateral shoulder flexion  Coordination:  Movements are Fluid and Coordinated: No  Coordination Comment: B UE tremors noted   Hand Function:  Hand Function  Gross Grasp: Functional  Coordination: Functional  Extremities: RUE   RUE : Exceptions to WFL (Greater than or equal to 3-/5 distally as evidenced during functional mobility) and LUE   LUE: Exceptions to WFL (Greater than or equal to 3-/5 distally as evidenced during functional mobility)    Outcome Measures: [facility] Daily Activity  Putting on and taking off regular lower body clothing: A lot  Bathing (including washing, rinsing, drying): A lot  Putting on and taking off regular upper body clothing: A little  Toileting, which includes using toilet, bedpan or urinal: A lot  Taking care of personal grooming such as brushing teeth: A little  Eating Meals: A little  Daily Activity - Total Score: 15        Education Documentation  Body Mechanics, taught by Lisa Saleem OT at 2/7/2025  2:13 PM.  Learner: Patient  Readiness:  Acceptance  Method: Explanation  Response: Needs Reinforcement, Verbalizes Understanding    ADL Training, taught by Lisa Saleem OT at 2/7/2025  2:13 PM.  Learner: Patient  Readiness: Acceptance  Method: Explanation  Response: Needs Reinforcement, Verbalizes Understanding    Education Comments  No comments found.        OP EDUCATION:       Goals:  Encounter Problems       Encounter Problems (Active)       ADLs       Patient will perform sponge UB and LB bathing in seating with stand by assist level of assistance.       Start:  02/07/25    Expected End:  02/21/25            Patient with complete lower body dressing with contact guard assist level of assistance donning and doffing all LE clothes  with PRN adaptive equipment while supported sitting and edge of bed.       Start:  02/07/25    Expected End:  02/21/25            Patient will complete daily grooming tasks with set-up level of assistance and PRN adaptive equipment while supported sitting and edge of bed .       Start:  02/07/25    Expected End:  02/21/25            Patient will complete toileting including hygiene clothing management/hygiene with contact guard assist level of assistance and bedside commode.       Start:  02/07/25    Expected End:  02/21/25               MOBILITY       Patient will perform Functional mobility x Household distances/Community Distances with contact guard assist level of assistance and front wheeled walker in order to improve safety and functional mobility.       Start:  02/07/25    Expected End:  02/21/25               TRANSFERS       Patient will perform bed mobility supervision level of assistance and bed rails in order to improve safety and independence with mobility       Start:  02/07/25    Expected End:  02/21/25            Patient will complete sit to stand transfer with stand by assist level of assistance and front wheeled walker in order to improve safety and prepare for out of bed mobility.       Start:  02/07/25     Expected End:  02/21/25

## 2025-02-07 NOTE — PROGRESS NOTES
Speech-Language Pathology  Adult Inpatient Clinical Bedside Swallow Evaluation    Patient Name: Marlo Carl  MRN: [id number]  Today's Date: 2/7/2025   Start Time: 1102  Stop Time: 1118  Time Calculation (min): 16    History of Present Illness: Patient with a past medical history of HTN, HLD, BPH with LUTS, CKD III. Lumbar Stenosis, Pulmonary Embolism, hx of CVA with Vascular Dementia, OAB, Incontinence, Parkinsonism was doing well until about a week ago when he was exposed to COVID at his house he was mildly symptomatic but this progressed to increasing weakness and lethargy along with nausea and vomiting and loss of appetite Was brought to the emergency room where workup suggests possible pneumonia    CT AP indicates Patchy and nodular ground-glass opacities in the  visualized lower lungs suggesting atypical infection or pneumonitis.  Calcified granulomas in the visualized lower lobes. Streaky bibasilar  atelectasis. Coronary artery calcifications. Aortic valvular  calcifications. Calcified mediastinal lymph nodes.    Assessment:   Clinical bedside swallow evaluation completed. Pt alertand oriented.  OME unremarkable.  Pt consumed trials of ice chips, single sips water from straw.  Able to consume 3oz thin water via straw without stopping with no overt s/s aspiration. Trials advanced to puree and solids.  Pt independently takes small bites though mastication is efficient and functional.  No significant lingual residuals though pt utilized thin liquids to clear with out difficulty.  Audible swallow response noted, though does not appear to be affecting swallow timing/effiency.       Recommendations:  Regular diet/thin liquids  Upright for all PO intake  Small bites/sips  Straws ok  Slow rate of consumption  Pills per pt preference  Feed only when alert  Alternate food and liquids    Goal: 1. Patient will consume prescribed diet with no overt s/s aspiration 100% of the time.  2. Patient will implement safe swallowing  strategies to reduce risk of aspiration in 90% of trials given caregiver assistance/cueing as needed.  3. Patient will complete modified barium swallow study (MBSS) as warranted upon further assessment/discussion with medical team    Long Term Goals:  KMLTDYSGOALS: Patient will tolerate the least restrictive diet without overt difficulty or further pulmonary compromise by time of discharge.               Plan:  SLP Services Indicated: Yes  Frequency: Other 1x  Discussed POC with patient  SLP - OK to Discharge    Pain:   0-10  0 = No pain.     Inpatient Education:  Extensive education provided to patient regarding current swallow function, recommendations/results, and POC.      Consultations/Referrals/Coordination of Services:   N/A

## 2025-02-07 NOTE — PROGRESS NOTES
Marlo Carl is a 84 y.o. male     Patient sitting up in chair this afternoon  Feels better compared to yesterday  Ate very well    Review of Systems     Constitutional: no fever, no chills, not feeling poorly, not feeling tired   Cardiovascular: no chest pain   Respiratory: no cough, wheezing or shortness of breath a  Gastrointestinal: no abdominal pain, no constipation, no melena, no nausea, no diarrhea, no vomiting and no blood in stools.   Neurological: no headache,   All other systems have been reviewed and are negative for complaint.       Vitals:    02/07/25 1137   BP: 139/69   Pulse: 67   Resp: 14   Temp: 36.7 °C (98 °F)   SpO2: 100%        Scheduled medications  atorvastatin, 20 mg, oral, Nightly  azithromycin, 500 mg, oral, q24h  cefTRIAXone, 1 g, intravenous, q24h  docusate sodium, 100 mg, oral, Daily  donepezil, 10 mg, oral, Nightly  heparin (porcine), 5,000 Units, subcutaneous, q8h  NIFEdipine ER, 90 mg, oral, Daily  oxybutynin, 5 mg, oral, TID  pantoprazole, 40 mg, oral, Daily before breakfast      Continuous medications     PRN medications  PRN medications: acetaminophen, benzonatate, hydrALAZINE, ipratropium-albuteroL, melatonin, ondansetron, polyethylene glycol    Lab Review   Results from last 7 days   Lab Units 02/07/25  0552 02/06/25  1459 02/05/25  1502   WBC AUTO x10*3/uL 4.8 7.7 11.3   HEMOGLOBIN g/dL 10.6* 11.1* 12.6*   HEMATOCRIT % 32.5* 35.6* 41.5   PLATELETS AUTO x10*3/uL 176 177 205     Results from last 7 days   Lab Units 02/07/25  0552 02/06/25  1459 02/05/25  1502   SODIUM mmol/L 139 140 143   POTASSIUM mmol/L 3.7 3.7 4.1   CHLORIDE mmol/L 109* 107 109*   CO2 mmol/L 23 26 23   BUN mg/dL 36* 40* 43*   CREATININE mg/dL 1.98* 2.01* 2.53*   CALCIUM mg/dL 8.1* 8.4* 9.1   PROTEIN TOTAL g/dL  --   --  7.9   BILIRUBIN TOTAL mg/dL  --   --  0.4   ALK PHOS U/L  --   --  76   ALT U/L  --   --  17   AST U/L  --   --  27   GLUCOSE mg/dL 91 103* 122*     Results from last 7 days   Lab Units  02/07/25  0552 02/05/25  1643 02/05/25  1502   TROPHS ng/L 121* 200* 210*        CT abdomen pelvis wo IV contrast   Final Result   Diffuse colonic diverticulosis.        Numerous bilateral hypoattenuating lesions suggestive of   hemorrhagic/proteinaceous cysts but incompletely characterized   without contrast.        Mild asymmetric left-sided bladder wall thickening, nonspecific.   Prostatomegaly with questionable TURP defect.        Right adrenal adenoma with stable left adrenal nodule.        Atherosclerosis with abdominal aortic aneurysm.        Patchy and nodular ground-glass opacities in the visualized lower   lungs suggesting atypical infection or pneumonitis.        Signed by: Xin Madsen 2/5/2025 6:21 PM   Dictation workstation:   [id number]      CT head wo IV contrast   Final Result   No evidence of acute cortical infarct or intracranial hemorrhage.        Stable intracranial findings since comparison head CT 09/08/2022.        MACRO:   None             Signed by: Dani Kingsley 2/5/2025 3:33 PM   Dictation workstation:   [id number]      XR chest 2 views   Final Result   No focal infiltrate or pneumothorax.        MACRO:   None.        Signed by: Eliot Caceres 2/5/2025 3:13 PM   Dictation workstation:   [id number]            Physical Exam    Constitutional   General appearance: Alert and in no acute distress.   Pulmonary   Respiratory assessment: No respiratory distress, normal respiratory rhythm and effort.    Auscultation of Lungs: Clear bilateral breath sounds.   Cardiovascular   Auscultation of heart: Apical pulse normal, heart rate and rhythm normal, normal S1 and S2, no murmurs and no pericardial rub.    Exam for edema: No peripheral edema.   Abdomen   Abdominal Exam: No bruits, normal bowel sounds, soft, non-tender, no abdominal mass palpated.    Liver and Spleen exam: No hepato-splenomegaly.   Musculoskeletal     Inspection/palpation of joints, bones and muscles: No joint swelling. Normal  movement of all extremities.   Neurologic   Cranial nerves: Nerves 2-12 were intact, no focal neuro defects.         Assessment/Plan      #Atypical pneumonia versus walking pneumonia  Improving  Continue IV antibiotics     #Acute kidney injury from dehydration  #Possible UTI  Continue IV Rocephin  Urine cultures pending     #Elevated troponin  Flat trend likely demand ischemia from the infection     Hypertension  Hold medications for systolic blood pressure less than 120     #Dyslipidemia  Continue statins     #Dementia  Appears to be at his baseline

## 2025-02-07 NOTE — CARE PLAN
Problem: Pain - Adult  Goal: Verbalizes/displays adequate comfort level or baseline comfort level  2/7/2025 0444 by Marj Melendez RN  Outcome: Progressing  2/7/2025 0439 by Marj Melendez RN  Outcome: Progressing     Problem: Safety - Adult  Goal: Free from fall injury  2/7/2025 0444 by Marj Melendez RN  Outcome: Progressing  2/7/2025 0439 by Marj Melendez RN  Outcome: Progressing     Problem: Discharge Planning  Goal: Discharge to home or other facility with appropriate resources  2/7/2025 0444 by Marj Melendez RN  Outcome: Progressing  2/7/2025 0439 by Marj Melendez RN  Outcome: Progressing     Problem: Chronic Conditions and Co-morbidities  Goal: Patient's chronic conditions and co-morbidity symptoms are monitored and maintained or improved  2/7/2025 0444 by Marj Melendez RN  Outcome: Progressing  2/7/2025 0439 by Marj Melendez RN  Outcome: Progressing     Problem: Nutrition  Goal: Nutrient intake appropriate for maintaining nutritional needs  2/7/2025 0444 by Marj Melendez RN  Outcome: Progressing  2/7/2025 0439 by Marj Melendez RN  Outcome: Progressing   The patient's goals for the shift include      The clinical goals for the shift include     Over the shift, the patient did not make progress toward the following goals. Barriers to progression include ***. Recommendations to address these barriers include ***.

## 2025-02-07 NOTE — PROGRESS NOTES
02/07/25 1124   Discharge Planning   Expected Discharge Disposition Home     Once med ready plan would be to discharge home with daughter, patient is on IV Rocephin, she has consults to PT/OT and SLP, I will continue to monitor for discharge planing and home going needs.

## 2025-02-07 NOTE — CARE PLAN
Problem: Pain - Adult  Goal: Verbalizes/displays adequate comfort level or baseline comfort level  Outcome: Progressing     Problem: Discharge Planning  Goal: Discharge to home or other facility with appropriate resources  Outcome: Progressing     Problem: Chronic Conditions and Co-morbidities  Goal: Patient's chronic conditions and co-morbidity symptoms are monitored and maintained or improved  Outcome: Progressing     Problem: Safety - Adult  Goal: Free from fall injury  Outcome: Progressing     Problem: Nutrition  Goal: Nutrient intake appropriate for maintaining nutritional needs  Outcome: Progressing   The patient's goals for the shift include      The clinical goals for the shift include Pt will remain safe and not fall during shift    Over the shift, the patient did not make progress toward the following goals. Barriers to progression include . Recommendations to address these barriers include .

## 2025-02-07 NOTE — PROGRESS NOTES
Physical Therapy    Physical Therapy Evaluation    Patient Name: Marlo Carl  MRN: [id number]  Department: [facility] A 5  Room: [address] [place] [address]  Today's Date: 2/7/2025   Time Calculation  Start Time: 1136  Stop Time: 1147  Time Calculation (min): 11 min    Assessment/Plan   PT Assessment  PT Assessment Results: Decreased mobility, Impaired balance, Decreased cognition  Rehab Prognosis: Good  Barriers to Discharge Home: Physical needs  Physical Needs: Stair navigation to access bed limited by function/safety, Stair navigation to access bath limited by function/safety, Ambulating household distances limited by function/safety, 24hr mobility assistance needed, Stair navigation into home limited by function/safety, High falls risk due to function or environment  Evaluation/Treatment Tolerance: Patient tolerated treatment well  Medical Staff Made Aware: Yes  Strengths: Ability to acquire knowledge, Attitude of self, Premorbid level of function, Rehab experience, Support and attitude of living partners, Support of extended family/friends  Barriers to Participation: Comorbidities, Housing layout  End of Session Communication: Bedside nurse  Assessment Comment: 84 year-old M with old strokes and parkinsonism presents with decreased bed mobility, stair-climbing, ambulation,and transfers, as well as unsteadiness; scored 11/28 on Tinetti gait and balance test, indicating a moderate to high risk for falls; can benefit from skilled PT intervention to assist with discharge planning and address the aforementioned issues to enable him to return to his prior level of function, which was IND tranfers and household ambulation with RW.   End of Session Patient Position: Up in chair, Alarm on (needs in reach)  IP OR SWING BED PT PLAN  Inpatient or Swing Bed: Inpatient  PT Plan  Treatment/Interventions: Bed mobility, Transfer training, Gait training, Stair training, Balance training, Neuromuscular re-education, Endurance training, Therapeutic  exercise, Therapeutic activity  PT Plan: Ongoing PT  PT Frequency: 4 times per week  PT Discharge Recommendations: Moderate intensity level of continued care  PT Recommended Transfer Status: Assist x1, Assistive device (walker)  PT - OK to Discharge: Yes (per PT POC)    Subjective   General Visit Information:  General  Reason for Referral: 85 y/o M to ED with weakness, N/V, and lethargy following Covid exposure (vs infection?)  Referred By: Kaci Prince, APRN-CNP  Past Medical History Relevant to Rehab:   Past Medical History:   Diagnosis Date    Anemia     Arthritis     BPH with obstruction/lower urinary tract symptoms     Cerebral vascular accident (Multi)     1996, 2016 w/ residual left sided weakness, CT Head 9/8/22  Large area of encephalomalacia of the right MCA territory, unchanged since prior. 3. Advanced chronic microvascular ischemic changes and chronic left basal ganglia lacunar infarct.    CKD (chronic kidney disease)     Cognitive decline     Dementia     on donzepril    Depression     Hearing aid worn     refuses to wear    HL (hearing loss)     Hypertension     OAB (overactive bladder)     PE (pulmonary thromboembolism) (Multi) 09/07/2018    off anticoags    Resting tremor     mild hands    RLS (restless legs syndrome)     Spinal stenosis     Unspecified urinary incontinence      Past Surgical History:   Procedure Laterality Date    CARPAL TUNNEL RELEASE Bilateral     CATARACT EXTRACTION Bilateral 2006    EXCISION / REPAIR HYDROCELE PEDIATRIC Left 12/2022    TRANSURETHRAL RESECTION OF PROSTATE  2016       Family/Caregiver Present: No  Prior to Session Communication: Bedside nurse  Patient Position Received: Up in chair, Alarm on  Preferred Learning Style: auditory, verbal  General Comment: Pt pleasant and agreeable to PT eval.  Home Living:  Home Living  Type of Home: House  Lives With: Spouse, Adult children, Grandchildren (wife, two daughters and two gransdaughters)  Home Adaptive Equipment:  Walker rolling or standard, Cane (Rolator)  Home Layout: Two level, Bed/bath upstairs, Stairs to alternate level with rails  Alternate Level Stairs-Rails:  (unilteral; had bilateral rails but tone rail came loose)  Alternate Level Stairs-Number of Steps: 12  Home Access: Stairs to enter with rails  Entrance Stairs-Rails: Both  Entrance Stairs-Number of Steps: 4  Bathroom Shower/Tub: Tub/shower unit  Bathroom Toilet: Standard  Bathroom Equipment: Shower chair with back  Bathroom Accessibility: No toilet on first?  Prior Level of Function:  Prior Function Per Pt/Caregiver Report  Level of [place]: Independent with ADLs and functional transfers  Receives Help From: Family  ADL Assistance:  (assist with dressing and showering)  Homemaking Assistance:  (family completes)  Ambulatory Assistance: Independent (with RW)  Prior Function Comments: Pt denies recent falls; has been receiving OP PT for the past month or so.   Precautions:  Precautions  Medical Precautions: Fall precautions      Date/Time Vitals Session Patient Position Pulse Resp SpO2 BP MAP (mmHg)    02/07/25 1137 --  --  67  14  100 %  139/69  92                 Objective   Pain:  Pain Assessment  Pain Assessment: 0-10  0-10 (Numeric) Pain Score: 0 - No pain  Cognition:  Cognition  Arousal/Alertness: Appropriate responses to stimuli  Orientation Level: Disoriented to time, Disoriented to situation  Following Commands: Follows all commands and directions without difficulty  Attention: Within Functional Limits  Memory:  (good recollection of home set-up, but disoriented to time and situation; easily redirected.)  Safety/Judgement: Within Functional Limits  Insight: Mild  Impulsive: Mildly  Processing Speed: Within funtional limits    General Assessments:  General Observation  General Observation: Pleasant and appropriate throughout session.    Activity Tolerance  Endurance: Endurance does not limit participation in activity    Coordination  Movements are  Fluid and Coordinated: Yes  Coordination Comment: B UE tremors noted    Postural Control  Postural Control: Within Functional Limits  Posture Comment: flexed trunk with left sidebending when standing    Static Sitting Balance  Static Sitting-Balance Support: No upper extremity supported, Feet supported  Static Sitting-Level of Assistance: Independent  Static Sitting-Comment/Number of Minutes: edge of chair  Dynamic Sitting Balance  Dynamic Sitting-Balance Support: No upper extremity supported, Feet supported  Dynamic Sitting-Level of Assistance: Close supervision  Dynamic Sitting-Balance: Forward lean (LE strength testing)  Dynamic Sitting-Comments: edge of chair    Static Standing Balance  Static Standing-Balance Support: Bilateral upper extremity supported  Static Standing-Level of Assistance: Contact guard  Static Standing-Comment/Number of Minutes: with RW  Dynamic Standing Balance  Dynamic Standing-Balance Support: Bilateral upper extremity supported  Dynamic Standing-Level of Assistance: Minimum assistance  Dynamic Standing-Balance: Turning  Dynamic Standing-Comments: with RW  Functional Assessments:  Bed Mobility  Bed Mobility: No (min to mod assist supine to sit per OT)    Transfers  Transfer: Yes  Transfer 1  Transfer From 1: Chair with arms to  Transfer to 1: Stand, Sit  Technique 1: Sit to stand, Stand to sit  Transfer Device 1: Walker, Gait belt  Transfer Level of Assistance 1: Contact guard, Minimal verbal cues  Trials/Comments 1: cues for hand placements    Ambulation/Gait Training  Ambulation/Gait Training Performed: Yes  Ambulation/Gait Training 1  Surface 1: Level tile  Device 1: Rolling walker  Gait Support Devices: Gait belt  Assistance 1: Minimum assistance, Minimal verbal cues  Quality of Gait 1: Narrow base of support, Festinating, Forward flexed posture, Decreased step length, Shuffling gait  Comments/Distance (ft) 1: 15'x2 (cues for increased step length and posture)  Extremity/Trunk  Assessments:  RUE   RUE : Within Functional Limits  LUE   LUE: Within Functional Limits  RLE   RLE : Within Functional Limits  LLE   LLE : Within Functional Limits  Outcome Measures:  [facility] Basic Mobility  Turning from your back to your side while in a flat bed without using bedrails: A little  Moving from lying on your back to sitting on the side of a flat bed without using bedrails: A lot  Moving to and from bed to chair (including a wheelchair): A lot  Standing up from a chair using your arms (e.g. wheelchair or bedside chair): A little  To walk in hospital room: A little  Climbing 3-5 steps with railing: A lot  Basic Mobility - Total Score: 15    Tinetti  Sitting Balance: Steady, safe  Arises: Unable without help  Attempts to Arise: Able, requires more than one attempt  Immediate Standing Balance (First 5 Seconds): Steady but uses walker or other support  Standing Balance: Steady but wide stance, uses cane or other support  Nudged: Staggers, grabs, catches self  Eyes Closed: Unsteady  Turned 360 Degrees: Steadiness: Unsteady (Grabs, staggers)  Turned 360 Degrees: Continuity of Steps: Discontinuous steps  Sitting Down: Uses arms or not a smooth motion  Balance Score: 6  Initiation of Gait: No hesitancy  Step Height: R Swing Foot: Right foot does not clear floor completely with step  Step Length: R Swing Foot: Does not pass left stance foot with step  Step Height: L Swing Foot: Left foot does not clear floor completely with step  Step Length: L Swing Foot: Does not pass right stance foot with step  Step Symmetry: Right and left step appear equal  Step Continuity: Steps appear continuous  Path: Mild/moderate deviation or uses walking aid  Trunk: Marked sway or uses walking aid  Walking Time: Heels almost touching while walking  Gait Score: 5  Total Score: 11    Encounter Problems       Encounter Problems (Active)       Balance       STG - Independently maintains dynamic standing balance with upper extremity support  while walking.       Start:  02/07/25    Expected End:  02/21/25       INTERVENTIONS:  1. Practice standing with minimal support.  2. Educate patient about standing tolerance.  3. Educate patient about independence with gait, transfers, and ADL's.  4. Educate patient about use of assistive device.  5. Educate patient about self-directed care.         STG - Maintains static and dynamic sitting balance on EOB without upper extremity support for >15 minutes independently.        Start:  02/07/25    Expected End:  02/21/25       INTERVENTIONS:  1. Practice sitting on the edge of a bed/mat with minimal support.  2. Educate patient about maintining total hip precautions while maintaining balance.  3. Educate patient about pressure relief.  4. Educate patient about use of assistive device.            Mobility       STG - Patient will ambulate >50' with RW independently on level surfaces.        Start:  02/07/25    Expected End:  02/21/25            STG - Patient will ascend and descend four to six stairs with 2 rails and SBA.       Start:  02/07/25    Expected End:  02/21/25               PT Transfers       STG - Patient to transfer to and from sit to supine independently from flat bed.        Start:  02/07/25    Expected End:  02/21/25            STG - Patient will transfer sit to and from stand from EOB and chair with arms independently.        Start:  02/07/25    Expected End:  02/21/25               Pain - Adult              Education Documentation  Body Mechanics, taught by Mayelin Lewis PT at 2/7/2025 12:16 PM.  Learner: Patient  Readiness: Acceptance  Method: Explanation  Response: Verbalizes Understanding  Comment: See functional assessment for details.    Mobility Training, taught by Mayelin Lewis PT at 2/7/2025 12:16 PM.  Learner: Patient  Readiness: Acceptance  Method: Explanation  Response: Verbalizes Understanding  Comment: See functional assessment for details.    Education Comments  No comments  found.

## 2025-02-08 LAB
ANION GAP SERPL CALC-SCNC: 12 MMOL/L (ref 10–20)
BUN SERPL-MCNC: 36 MG/DL (ref 6–23)
CALCIUM SERPL-MCNC: 8.4 MG/DL (ref 8.6–10.3)
CHLORIDE SERPL-SCNC: 105 MMOL/L (ref 98–107)
CO2 SERPL-SCNC: 26 MMOL/L (ref 21–32)
CREAT SERPL-MCNC: 1.95 MG/DL (ref 0.5–1.3)
EGFRCR SERPLBLD CKD-EPI 2021: 33 ML/MIN/1.73M*2
ERYTHROCYTE [DISTWIDTH] IN BLOOD BY AUTOMATED COUNT: 14.4 % (ref 11.5–14.5)
GLUCOSE SERPL-MCNC: 90 MG/DL (ref 74–99)
HCT VFR BLD AUTO: 37.3 % (ref 41–52)
HGB BLD-MCNC: 11.8 G/DL (ref 13.5–17.5)
MCH RBC QN AUTO: 27 PG (ref 26–34)
MCHC RBC AUTO-ENTMCNC: 31.6 G/DL (ref 32–36)
MCV RBC AUTO: 85 FL (ref 80–100)
NRBC BLD-RTO: 0 /100 WBCS (ref 0–0)
PLATELET # BLD AUTO: 200 X10*3/UL (ref 150–450)
POTASSIUM SERPL-SCNC: 4.1 MMOL/L (ref 3.5–5.3)
RBC # BLD AUTO: 4.37 X10*6/UL (ref 4.5–5.9)
SODIUM SERPL-SCNC: 139 MMOL/L (ref 136–145)
WBC # BLD AUTO: 4.3 X10*3/UL (ref 4.4–11.3)

## 2025-02-08 PROCEDURE — 2500000001 HC RX 250 WO HCPCS SELF ADMINISTERED DRUGS (ALT 637 FOR MEDICARE OP): Performed by: INTERNAL MEDICINE

## 2025-02-08 PROCEDURE — 2500000004 HC RX 250 GENERAL PHARMACY W/ HCPCS (ALT 636 FOR OP/ED)

## 2025-02-08 PROCEDURE — 2500000002 HC RX 250 W HCPCS SELF ADMINISTERED DRUGS (ALT 637 FOR MEDICARE OP, ALT 636 FOR OP/ED)

## 2025-02-08 PROCEDURE — 2500000001 HC RX 250 WO HCPCS SELF ADMINISTERED DRUGS (ALT 637 FOR MEDICARE OP): Performed by: NURSE PRACTITIONER

## 2025-02-08 PROCEDURE — 2500000002 HC RX 250 W HCPCS SELF ADMINISTERED DRUGS (ALT 637 FOR MEDICARE OP, ALT 636 FOR OP/ED): Performed by: NURSE PRACTITIONER

## 2025-02-08 PROCEDURE — 99231 SBSQ HOSP IP/OBS SF/LOW 25: CPT | Performed by: INTERNAL MEDICINE

## 2025-02-08 PROCEDURE — 85027 COMPLETE CBC AUTOMATED: CPT | Performed by: NURSE PRACTITIONER

## 2025-02-08 PROCEDURE — 2500000001 HC RX 250 WO HCPCS SELF ADMINISTERED DRUGS (ALT 637 FOR MEDICARE OP): Performed by: PHARMACIST

## 2025-02-08 PROCEDURE — 80051 ELECTROLYTE PANEL: CPT | Performed by: NURSE PRACTITIONER

## 2025-02-08 PROCEDURE — 36415 COLL VENOUS BLD VENIPUNCTURE: CPT | Performed by: NURSE PRACTITIONER

## 2025-02-08 PROCEDURE — 1100000001 HC PRIVATE ROOM DAILY

## 2025-02-08 PROCEDURE — 80048 BASIC METABOLIC PNL TOTAL CA: CPT | Performed by: NURSE PRACTITIONER

## 2025-02-08 RX ADMIN — PANTOPRAZOLE SODIUM 40 MG: 40 TABLET, DELAYED RELEASE ORAL at 06:42

## 2025-02-08 RX ADMIN — AZITHROMYCIN 500 MG: 500 TABLET, FILM COATED ORAL at 09:21

## 2025-02-08 RX ADMIN — OXYBUTYNIN CHLORIDE 5 MG: 5 TABLET ORAL at 21:38

## 2025-02-08 RX ADMIN — OXYBUTYNIN CHLORIDE 5 MG: 5 TABLET ORAL at 09:21

## 2025-02-08 RX ADMIN — HEPARIN SODIUM 5000 UNITS: 5000 INJECTION, SOLUTION INTRAVENOUS; SUBCUTANEOUS at 14:38

## 2025-02-08 RX ADMIN — NIFEDIPINE 90 MG: 30 TABLET, FILM COATED, EXTENDED RELEASE ORAL at 09:21

## 2025-02-08 RX ADMIN — HEPARIN SODIUM 5000 UNITS: 5000 INJECTION, SOLUTION INTRAVENOUS; SUBCUTANEOUS at 21:38

## 2025-02-08 RX ADMIN — DOCUSATE SODIUM 100 MG: 100 CAPSULE, LIQUID FILLED ORAL at 09:21

## 2025-02-08 RX ADMIN — HEPARIN SODIUM 5000 UNITS: 5000 INJECTION, SOLUTION INTRAVENOUS; SUBCUTANEOUS at 06:42

## 2025-02-08 RX ADMIN — DONEPEZIL HYDROCHLORIDE 10 MG: 5 TABLET ORAL at 21:38

## 2025-02-08 RX ADMIN — ATORVASTATIN CALCIUM 20 MG: 20 TABLET, FILM COATED ORAL at 21:38

## 2025-02-08 RX ADMIN — OXYBUTYNIN CHLORIDE 5 MG: 5 TABLET ORAL at 14:38

## 2025-02-08 ASSESSMENT — COGNITIVE AND FUNCTIONAL STATUS - GENERAL
DRESSING REGULAR LOWER BODY CLOTHING: A LITTLE
STANDING UP FROM CHAIR USING ARMS: A LOT
CLIMB 3 TO 5 STEPS WITH RAILING: A LOT
MOVING FROM LYING ON BACK TO SITTING ON SIDE OF FLAT BED WITH BEDRAILS: A LITTLE
PERSONAL GROOMING: A LITTLE
WALKING IN HOSPITAL ROOM: A LOT
WALKING IN HOSPITAL ROOM: A LOT
CLIMB 3 TO 5 STEPS WITH RAILING: A LOT
MOVING FROM LYING ON BACK TO SITTING ON SIDE OF FLAT BED WITH BEDRAILS: A LITTLE
EATING MEALS: A LITTLE
TURNING FROM BACK TO SIDE WHILE IN FLAT BAD: A LITTLE
TURNING FROM BACK TO SIDE WHILE IN FLAT BAD: A LITTLE
HELP NEEDED FOR BATHING: A LITTLE
EATING MEALS: A LITTLE
DAILY ACTIVITIY SCORE: 18
DRESSING REGULAR UPPER BODY CLOTHING: A LITTLE
PERSONAL GROOMING: A LITTLE
HELP NEEDED FOR BATHING: A LITTLE
MOBILITY SCORE: 14
MOVING TO AND FROM BED TO CHAIR: A LOT
TOILETING: A LITTLE
MOBILITY SCORE: 14
DRESSING REGULAR UPPER BODY CLOTHING: A LITTLE
DRESSING REGULAR LOWER BODY CLOTHING: A LITTLE
MOVING TO AND FROM BED TO CHAIR: A LOT
DAILY ACTIVITIY SCORE: 18
STANDING UP FROM CHAIR USING ARMS: A LOT
TOILETING: A LITTLE

## 2025-02-08 ASSESSMENT — PAIN SCALES - GENERAL: PAINLEVEL_OUTOF10: 0 - NO PAIN

## 2025-02-08 NOTE — CARE PLAN
Problem: Pain - Adult  Goal: Verbalizes/displays adequate comfort level or baseline comfort level  Outcome: Progressing     Problem: Safety - Adult  Goal: Free from fall injury  Outcome: Progressing     Problem: Discharge Planning  Goal: Discharge to home or other facility with appropriate resources  Outcome: Progressing     Problem: Chronic Conditions and Co-morbidities  Goal: Patient's chronic conditions and co-morbidity symptoms are monitored and maintained or improved  Outcome: Progressing     Problem: Nutrition  Goal: Nutrient intake appropriate for maintaining nutritional needs  Outcome: Progressing   The patient's goals for the shift include  : rest and management of needs.    The clinical goals for the shift include Pt will remain safe and not fall during shift

## 2025-02-08 NOTE — PROGRESS NOTES
Marlo Carl is a 84 y.o. male     Continues to improve    Review of Systems     Constitutional: no fever, no chills, not feeling poorly, not feeling tired   Cardiovascular: no chest pain   Respiratory: no cough, wheezing or shortness of breath a  Gastrointestinal: no abdominal pain, no constipation, no melena, no nausea, no diarrhea, no vomiting and no blood in stools.   Neurological: no headache,   All other systems have been reviewed and are negative for complaint.       Vitals:    02/08/25 1120   BP: 149/77   Pulse:    Resp:    Temp: 36.6 °C (97.9 °F)   SpO2: 98%        Scheduled medications  atorvastatin, 20 mg, oral, Nightly  cefTRIAXone, 1 g, intravenous, q24h  docusate sodium, 100 mg, oral, Daily  donepezil, 10 mg, oral, Nightly  heparin (porcine), 5,000 Units, subcutaneous, q8h  NIFEdipine ER, 90 mg, oral, Daily  oxybutynin, 5 mg, oral, TID  pantoprazole, 40 mg, oral, Daily before breakfast      Continuous medications     PRN medications  PRN medications: acetaminophen, benzonatate, hydrALAZINE, ipratropium-albuteroL, melatonin, ondansetron, polyethylene glycol    Lab Review   Results from last 7 days   Lab Units 02/08/25  0531 02/07/25  0552 02/06/25  1459   WBC AUTO x10*3/uL 4.3* 4.8 7.7   HEMOGLOBIN g/dL 11.8* 10.6* 11.1*   HEMATOCRIT % 37.3* 32.5* 35.6*   PLATELETS AUTO x10*3/uL 200 176 177     Results from last 7 days   Lab Units 02/08/25  0531 02/07/25  0552 02/06/25  1459 02/05/25  1502   SODIUM mmol/L 139 139 140 143   POTASSIUM mmol/L 4.1 3.7 3.7 4.1   CHLORIDE mmol/L 105 109* 107 109*   CO2 mmol/L 26 23 26 23   BUN mg/dL 36* 36* 40* 43*   CREATININE mg/dL 1.95* 1.98* 2.01* 2.53*   CALCIUM mg/dL 8.4* 8.1* 8.4* 9.1   PROTEIN TOTAL g/dL  --   --   --  7.9   BILIRUBIN TOTAL mg/dL  --   --   --  0.4   ALK PHOS U/L  --   --   --  76   ALT U/L  --   --   --  17   AST U/L  --   --   --  27   GLUCOSE mg/dL 90 91 103* 122*     Results from last 7 days   Lab Units 02/07/25  0552 02/05/25  1643 02/05/25  4627    TROPHS ng/L 121* 200* 210*        CT abdomen pelvis wo IV contrast   Final Result   Diffuse colonic diverticulosis.        Numerous bilateral hypoattenuating lesions suggestive of   hemorrhagic/proteinaceous cysts but incompletely characterized   without contrast.        Mild asymmetric left-sided bladder wall thickening, nonspecific.   Prostatomegaly with questionable TURP defect.        Right adrenal adenoma with stable left adrenal nodule.        Atherosclerosis with abdominal aortic aneurysm.        Patchy and nodular ground-glass opacities in the visualized lower   lungs suggesting atypical infection or pneumonitis.        Signed by: Xin Madsen 2/5/2025 6:21 PM   Dictation workstation:   [id number]      CT head wo IV contrast   Final Result   No evidence of acute cortical infarct or intracranial hemorrhage.        Stable intracranial findings since comparison head CT 09/08/2022.        MACRO:   None             Signed by: Dani Kingslye 2/5/2025 3:33 PM   Dictation workstation:   [id number]      XR chest 2 views   Final Result   No focal infiltrate or pneumothorax.        MACRO:   None.        Signed by: Eliot Caceres 2/5/2025 3:13 PM   Dictation workstation:   [id number]            Physical Exam    Constitutional   General appearance: Alert and in no acute distress.   Pulmonary   Respiratory assessment: No respiratory distress, normal respiratory rhythm and effort.    Auscultation of Lungs: Clear bilateral breath sounds.   Cardiovascular   Auscultation of heart: Apical pulse normal, heart rate and rhythm normal, normal S1 and S2, no murmurs and no pericardial rub.    Exam for edema: No peripheral edema.   Abdomen   Abdominal Exam: No bruits, normal bowel sounds, soft, non-tender, no abdominal mass palpated.    Liver and Spleen exam: No hepato-splenomegaly.   Musculoskeletal     Inspection/palpation of joints, bones and muscles: No joint swelling. Normal movement of all extremities.   Neurologic    Cranial nerves: Nerves 2-12 were intact, no focal neuro defects.         Assessment/Plan      #Atypical pneumonia versus walking pneumonia  Improving  Continue IV antibiotics     #Acute kidney injury from dehydration  #Possible UTI  Continue IV Rocephin  Urine cultures pending     #Elevated troponin  Flat trend likely demand ischemia from the infection     Hypertension  Hold medications for systolic blood pressure less than 120     #Dyslipidemia  Continue statins     #Dementia  Appears to be at his baseline

## 2025-02-08 NOTE — CARE PLAN
Problem: Pain - Adult  Goal: Verbalizes/displays adequate comfort level or baseline comfort level  Outcome: Progressing     Problem: Safety - Adult  Goal: Free from fall injury  Outcome: Progressing     Problem: Discharge Planning  Goal: Discharge to home or other facility with appropriate resources  Outcome: Progressing     Problem: Chronic Conditions and Co-morbidities  Goal: Patient's chronic conditions and co-morbidity symptoms are monitored and maintained or improved  Outcome: Progressing     Problem: Nutrition  Goal: Nutrient intake appropriate for maintaining nutritional needs  Outcome: Progressing   The patient's goals for the shift include      The clinical goals for the shift include Pt will remain safe and not fall during shift

## 2025-02-08 NOTE — PROGRESS NOTES
02/08/25 0815   Discharge Planning   Expected Discharge Disposition Home H     Per notes PT/OT have rec'd MODx1 on 2/7, I did call the patient's daughter Surekha at [phone] in regards to discharge planing, and left a vm for her to call me back. Per previous notes plan would be to discharge home. If patient is discharging home she would most likely benefit from [facility], I will continue to monitor for discharge planing. Currently patient remains on IV rocephin pending urine cx's.

## 2025-02-09 VITALS
SYSTOLIC BLOOD PRESSURE: 174 MMHG | RESPIRATION RATE: 16 BRPM | OXYGEN SATURATION: 94 % | HEIGHT: 71 IN | TEMPERATURE: 99.3 F | HEART RATE: 71 BPM | DIASTOLIC BLOOD PRESSURE: 84 MMHG | WEIGHT: 170 LBS | BODY MASS INDEX: 23.8 KG/M2

## 2025-02-09 LAB
ANION GAP SERPL CALC-SCNC: 11 MMOL/L (ref 10–20)
BACTERIA UR CULT: NORMAL
BUN SERPL-MCNC: 28 MG/DL (ref 6–23)
CALCIUM SERPL-MCNC: 8.2 MG/DL (ref 8.6–10.3)
CHLORIDE SERPL-SCNC: 104 MMOL/L (ref 98–107)
CO2 SERPL-SCNC: 24 MMOL/L (ref 21–32)
CREAT SERPL-MCNC: 1.62 MG/DL (ref 0.5–1.3)
EGFRCR SERPLBLD CKD-EPI 2021: 41 ML/MIN/1.73M*2
ERYTHROCYTE [DISTWIDTH] IN BLOOD BY AUTOMATED COUNT: 14.1 % (ref 11.5–14.5)
GLUCOSE SERPL-MCNC: 91 MG/DL (ref 74–99)
HCT VFR BLD AUTO: 39.3 % (ref 41–52)
HGB BLD-MCNC: 12.4 G/DL (ref 13.5–17.5)
HOLD SPECIMEN: NORMAL
M PNEUMO IGM SER IA-ACNC: 0.09 U/L
MCH RBC QN AUTO: 26.6 PG (ref 26–34)
MCHC RBC AUTO-ENTMCNC: 31.6 G/DL (ref 32–36)
MCV RBC AUTO: 84 FL (ref 80–100)
NRBC BLD-RTO: 0 /100 WBCS (ref 0–0)
PLATELET # BLD AUTO: 205 X10*3/UL (ref 150–450)
POTASSIUM SERPL-SCNC: 3.6 MMOL/L (ref 3.5–5.3)
RBC # BLD AUTO: 4.66 X10*6/UL (ref 4.5–5.9)
SODIUM SERPL-SCNC: 135 MMOL/L (ref 136–145)
WBC # BLD AUTO: 4.2 X10*3/UL (ref 4.4–11.3)

## 2025-02-09 PROCEDURE — 1100000001 HC PRIVATE ROOM DAILY

## 2025-02-09 PROCEDURE — 2500000002 HC RX 250 W HCPCS SELF ADMINISTERED DRUGS (ALT 637 FOR MEDICARE OP, ALT 636 FOR OP/ED)

## 2025-02-09 PROCEDURE — 2500000004 HC RX 250 GENERAL PHARMACY W/ HCPCS (ALT 636 FOR OP/ED): Performed by: INTERNAL MEDICINE

## 2025-02-09 PROCEDURE — 2500000001 HC RX 250 WO HCPCS SELF ADMINISTERED DRUGS (ALT 637 FOR MEDICARE OP): Performed by: INTERNAL MEDICINE

## 2025-02-09 PROCEDURE — 99232 SBSQ HOSP IP/OBS MODERATE 35: CPT | Performed by: INTERNAL MEDICINE

## 2025-02-09 PROCEDURE — 36415 COLL VENOUS BLD VENIPUNCTURE: CPT | Performed by: NURSE PRACTITIONER

## 2025-02-09 PROCEDURE — 2500000002 HC RX 250 W HCPCS SELF ADMINISTERED DRUGS (ALT 637 FOR MEDICARE OP, ALT 636 FOR OP/ED): Performed by: NURSE PRACTITIONER

## 2025-02-09 PROCEDURE — 2500000001 HC RX 250 WO HCPCS SELF ADMINISTERED DRUGS (ALT 637 FOR MEDICARE OP): Performed by: NURSE PRACTITIONER

## 2025-02-09 PROCEDURE — 82374 ASSAY BLOOD CARBON DIOXIDE: CPT | Performed by: NURSE PRACTITIONER

## 2025-02-09 PROCEDURE — 76937 US GUIDE VASCULAR ACCESS: CPT

## 2025-02-09 PROCEDURE — 85027 COMPLETE CBC AUTOMATED: CPT | Performed by: NURSE PRACTITIONER

## 2025-02-09 PROCEDURE — 2500000005 HC RX 250 GENERAL PHARMACY W/O HCPCS: Performed by: INTERNAL MEDICINE

## 2025-02-09 PROCEDURE — 2500000004 HC RX 250 GENERAL PHARMACY W/ HCPCS (ALT 636 FOR OP/ED)

## 2025-02-09 RX ADMIN — NIFEDIPINE 90 MG: 30 TABLET, FILM COATED, EXTENDED RELEASE ORAL at 08:30

## 2025-02-09 RX ADMIN — HEPARIN SODIUM 5000 UNITS: 5000 INJECTION, SOLUTION INTRAVENOUS; SUBCUTANEOUS at 06:04

## 2025-02-09 RX ADMIN — OXYBUTYNIN CHLORIDE 5 MG: 5 TABLET ORAL at 22:20

## 2025-02-09 RX ADMIN — Medication 3 MG: at 22:24

## 2025-02-09 RX ADMIN — HEPARIN SODIUM 5000 UNITS: 5000 INJECTION, SOLUTION INTRAVENOUS; SUBCUTANEOUS at 15:46

## 2025-02-09 RX ADMIN — PANTOPRAZOLE SODIUM 40 MG: 40 TABLET, DELAYED RELEASE ORAL at 06:04

## 2025-02-09 RX ADMIN — ACETAMINOPHEN 650 MG: 325 TABLET, FILM COATED ORAL at 22:24

## 2025-02-09 RX ADMIN — HEPARIN SODIUM 5000 UNITS: 5000 INJECTION, SOLUTION INTRAVENOUS; SUBCUTANEOUS at 22:20

## 2025-02-09 RX ADMIN — DOCUSATE SODIUM 100 MG: 100 CAPSULE, LIQUID FILLED ORAL at 08:30

## 2025-02-09 RX ADMIN — CARBOXYMETHYLCELLULOSE SODIUM 1 DROP: 5 SOLUTION/ DROPS OPHTHALMIC at 17:49

## 2025-02-09 RX ADMIN — DONEPEZIL HYDROCHLORIDE 10 MG: 5 TABLET ORAL at 22:20

## 2025-02-09 RX ADMIN — OXYBUTYNIN CHLORIDE 5 MG: 5 TABLET ORAL at 08:30

## 2025-02-09 RX ADMIN — CEFTRIAXONE SODIUM 1 G: 1 INJECTION, SOLUTION INTRAVENOUS at 22:20

## 2025-02-09 RX ADMIN — CEFTRIAXONE SODIUM 1 G: 1 INJECTION, SOLUTION INTRAVENOUS at 00:44

## 2025-02-09 RX ADMIN — OXYBUTYNIN CHLORIDE 5 MG: 5 TABLET ORAL at 15:46

## 2025-02-09 RX ADMIN — ATORVASTATIN CALCIUM 20 MG: 20 TABLET, FILM COATED ORAL at 22:20

## 2025-02-09 ASSESSMENT — COGNITIVE AND FUNCTIONAL STATUS - GENERAL
WALKING IN HOSPITAL ROOM: A LOT
PERSONAL GROOMING: A LITTLE
MOBILITY SCORE: 14
DRESSING REGULAR LOWER BODY CLOTHING: A LITTLE
HELP NEEDED FOR BATHING: A LITTLE
MOVING TO AND FROM BED TO CHAIR: A LOT
MOVING FROM LYING ON BACK TO SITTING ON SIDE OF FLAT BED WITH BEDRAILS: A LITTLE
STANDING UP FROM CHAIR USING ARMS: A LOT
TURNING FROM BACK TO SIDE WHILE IN FLAT BAD: A LITTLE
CLIMB 3 TO 5 STEPS WITH RAILING: A LOT
DAILY ACTIVITIY SCORE: 18
DRESSING REGULAR UPPER BODY CLOTHING: A LITTLE
TOILETING: A LITTLE
EATING MEALS: A LITTLE

## 2025-02-09 ASSESSMENT — PAIN DESCRIPTION - LOCATION: LOCATION: LEG

## 2025-02-09 ASSESSMENT — PAIN - FUNCTIONAL ASSESSMENT
PAIN_FUNCTIONAL_ASSESSMENT: 0-10
PAIN_FUNCTIONAL_ASSESSMENT: 0-10

## 2025-02-09 ASSESSMENT — PAIN SCALES - GENERAL
PAINLEVEL_OUTOF10: 8
PAINLEVEL_OUTOF10: 3
PAINLEVEL_OUTOF10: 0 - NO PAIN
PAINLEVEL_OUTOF10: 0 - NO PAIN

## 2025-02-09 ASSESSMENT — PAIN DESCRIPTION - ORIENTATION: ORIENTATION: LEFT

## 2025-02-09 NOTE — CARE PLAN
The patient's goals for the shift include      The clinical goals for the shift include pt will remain free of pain and remain HDS during this shift    Over the shift, the patient did not make progress toward the following goals. Barriers to progression include n/a. Recommendations to address these barriers include n/a.

## 2025-02-09 NOTE — PROGRESS NOTES
Marlo Carl is a 84 y.o. male     Continues to improve  Discussed with daughter  Will need to go SNF    Review of Systems     Constitutional: no fever, no chills, not feeling poorly, not feeling tired   Cardiovascular: no chest pain   Respiratory: no cough, wheezing or shortness of breath a  Gastrointestinal: no abdominal pain, no constipation, no melena, no nausea, no diarrhea, no vomiting and no blood in stools.   Neurological: no headache,   All other systems have been reviewed and are negative for complaint.       Vitals:    02/09/25 1123   BP: 136/57   Pulse:    Resp: 18   Temp: 37 °C (98.6 °F)   SpO2: 94%        Scheduled medications  atorvastatin, 20 mg, oral, Nightly  cefTRIAXone, 1 g, intravenous, q24h  docusate sodium, 100 mg, oral, Daily  donepezil, 10 mg, oral, Nightly  heparin (porcine), 5,000 Units, subcutaneous, q8h  NIFEdipine ER, 90 mg, oral, Daily  oxybutynin, 5 mg, oral, TID  pantoprazole, 40 mg, oral, Daily before breakfast      Continuous medications     PRN medications  PRN medications: acetaminophen, benzonatate, hydrALAZINE, ipratropium-albuteroL, melatonin, ondansetron, polyethylene glycol    Lab Review   Results from last 7 days   Lab Units 02/09/25  0614 02/08/25  0531 02/07/25  0552   WBC AUTO x10*3/uL 4.2* 4.3* 4.8   HEMOGLOBIN g/dL 12.4* 11.8* 10.6*   HEMATOCRIT % 39.3* 37.3* 32.5*   PLATELETS AUTO x10*3/uL 205 200 176     Results from last 7 days   Lab Units 02/09/25  0614 02/08/25  0531 02/07/25  0552 02/06/25  1459 02/05/25  1502   SODIUM mmol/L 135* 139 139   < > 143   POTASSIUM mmol/L 3.6 4.1 3.7   < > 4.1   CHLORIDE mmol/L 104 105 109*   < > 109*   CO2 mmol/L 24 26 23   < > 23   BUN mg/dL 28* 36* 36*   < > 43*   CREATININE mg/dL 1.62* 1.95* 1.98*   < > 2.53*   CALCIUM mg/dL 8.2* 8.4* 8.1*   < > 9.1   PROTEIN TOTAL g/dL  --   --   --   --  7.9   BILIRUBIN TOTAL mg/dL  --   --   --   --  0.4   ALK PHOS U/L  --   --   --   --  76   ALT U/L  --   --   --   --  17   AST U/L  --   --    --   --  27   GLUCOSE mg/dL 91 90 91   < > 122*    < > = values in this interval not displayed.     Results from last 7 days   Lab Units 02/07/25  0552 02/05/25  1643 02/05/25  1502   TROPHS ng/L 121* 200* 210*        CT abdomen pelvis wo IV contrast   Final Result   Diffuse colonic diverticulosis.        Numerous bilateral hypoattenuating lesions suggestive of   hemorrhagic/proteinaceous cysts but incompletely characterized   without contrast.        Mild asymmetric left-sided bladder wall thickening, nonspecific.   Prostatomegaly with questionable TURP defect.        Right adrenal adenoma with stable left adrenal nodule.        Atherosclerosis with abdominal aortic aneurysm.        Patchy and nodular ground-glass opacities in the visualized lower   lungs suggesting atypical infection or pneumonitis.        Signed by: Xin Madsen 2/5/2025 6:21 PM   Dictation workstation:   [id number]      CT head wo IV contrast   Final Result   No evidence of acute cortical infarct or intracranial hemorrhage.        Stable intracranial findings since comparison head CT 09/08/2022.        MACRO:   None             Signed by: Dani Kingsley 2/5/2025 3:33 PM   Dictation workstation:   [id number]      XR chest 2 views   Final Result   No focal infiltrate or pneumothorax.        MACRO:   None.        Signed by: Eliot Caceres 2/5/2025 3:13 PM   Dictation workstation:   [id number]            Physical Exam    Constitutional   General appearance: Alert and in no acute distress.   Pulmonary   Respiratory assessment: No respiratory distress, normal respiratory rhythm and effort.    Auscultation of Lungs: Clear bilateral breath sounds.   Cardiovascular   Auscultation of heart: Apical pulse normal, heart rate and rhythm normal, normal S1 and S2, no murmurs and no pericardial rub.    Exam for edema: No peripheral edema.   Abdomen   Abdominal Exam: No bruits, normal bowel sounds, soft, non-tender, no abdominal mass palpated.    Liver and  Spleen exam: No hepato-splenomegaly.   Musculoskeletal     Inspection/palpation of joints, bones and muscles: No joint swelling. Normal movement of all extremities.   Neurologic   Cranial nerves: Nerves 2-12 were intact, no focal neuro defects.         Assessment/Plan      #Atypical pneumonia versus walking pneumonia  Improving  Continue IV antibiotics     #Acute kidney injury from dehydration    UA negative  Ruled out UTI    #Elevated troponin  Flat trend likely demand ischemia from the infection     Hypertension  Hold medications for systolic blood pressure less than 120     #Dyslipidemia  Continue statins     #Dementia  Appears to be at his baseline

## 2025-02-09 NOTE — CARE PLAN
Problem: Pain - Adult  Goal: Verbalizes/displays adequate comfort level or baseline comfort level  Outcome: Progressing     Problem: Safety - Adult  Goal: Free from fall injury  Outcome: Progressing     Problem: Chronic Conditions and Co-morbidities  Goal: Patient's chronic conditions and co-morbidity symptoms are monitored and maintained or improved  Outcome: Progressing   The patient's goals for the shift include      The clinical goals for the shift include Safety will maintain until the end of the shift

## 2025-02-10 LAB
ANION GAP SERPL CALC-SCNC: 10 MMOL/L (ref 10–20)
BUN SERPL-MCNC: 24 MG/DL (ref 6–23)
CALCIUM SERPL-MCNC: 8.3 MG/DL (ref 8.6–10.3)
CHLORIDE SERPL-SCNC: 106 MMOL/L (ref 98–107)
CO2 SERPL-SCNC: 24 MMOL/L (ref 21–32)
CREAT SERPL-MCNC: 1.62 MG/DL (ref 0.5–1.3)
EGFRCR SERPLBLD CKD-EPI 2021: 41 ML/MIN/1.73M*2
ERYTHROCYTE [DISTWIDTH] IN BLOOD BY AUTOMATED COUNT: 14 % (ref 11.5–14.5)
GLUCOSE SERPL-MCNC: 87 MG/DL (ref 74–99)
HCT VFR BLD AUTO: 33.6 % (ref 41–52)
HGB BLD-MCNC: 10.9 G/DL (ref 13.5–17.5)
MCH RBC QN AUTO: 27.3 PG (ref 26–34)
MCHC RBC AUTO-ENTMCNC: 32.4 G/DL (ref 32–36)
MCV RBC AUTO: 84 FL (ref 80–100)
NRBC BLD-RTO: 0 /100 WBCS (ref 0–0)
PLATELET # BLD AUTO: 197 X10*3/UL (ref 150–450)
POTASSIUM SERPL-SCNC: 3.8 MMOL/L (ref 3.5–5.3)
RBC # BLD AUTO: 3.99 X10*6/UL (ref 4.5–5.9)
SODIUM SERPL-SCNC: 136 MMOL/L (ref 136–145)
WBC # BLD AUTO: 4.2 X10*3/UL (ref 4.4–11.3)

## 2025-02-10 PROCEDURE — 2500000004 HC RX 250 GENERAL PHARMACY W/ HCPCS (ALT 636 FOR OP/ED): Performed by: INTERNAL MEDICINE

## 2025-02-10 PROCEDURE — 2500000001 HC RX 250 WO HCPCS SELF ADMINISTERED DRUGS (ALT 637 FOR MEDICARE OP): Performed by: INTERNAL MEDICINE

## 2025-02-10 PROCEDURE — 2500000002 HC RX 250 W HCPCS SELF ADMINISTERED DRUGS (ALT 637 FOR MEDICARE OP, ALT 636 FOR OP/ED)

## 2025-02-10 PROCEDURE — 97116 GAIT TRAINING THERAPY: CPT | Mod: GP

## 2025-02-10 PROCEDURE — 2500000002 HC RX 250 W HCPCS SELF ADMINISTERED DRUGS (ALT 637 FOR MEDICARE OP, ALT 636 FOR OP/ED): Performed by: NURSE PRACTITIONER

## 2025-02-10 PROCEDURE — 97535 SELF CARE MNGMENT TRAINING: CPT | Mod: GO

## 2025-02-10 PROCEDURE — 99231 SBSQ HOSP IP/OBS SF/LOW 25: CPT | Performed by: INTERNAL MEDICINE

## 2025-02-10 PROCEDURE — 2500000001 HC RX 250 WO HCPCS SELF ADMINISTERED DRUGS (ALT 637 FOR MEDICARE OP): Performed by: NURSE PRACTITIONER

## 2025-02-10 PROCEDURE — 85027 COMPLETE CBC AUTOMATED: CPT | Performed by: NURSE PRACTITIONER

## 2025-02-10 PROCEDURE — 97530 THERAPEUTIC ACTIVITIES: CPT | Mod: GP

## 2025-02-10 PROCEDURE — 2500000004 HC RX 250 GENERAL PHARMACY W/ HCPCS (ALT 636 FOR OP/ED)

## 2025-02-10 PROCEDURE — 1100000001 HC PRIVATE ROOM DAILY

## 2025-02-10 PROCEDURE — 97530 THERAPEUTIC ACTIVITIES: CPT | Mod: GO

## 2025-02-10 PROCEDURE — 80048 BASIC METABOLIC PNL TOTAL CA: CPT | Performed by: NURSE PRACTITIONER

## 2025-02-10 PROCEDURE — 36415 COLL VENOUS BLD VENIPUNCTURE: CPT | Performed by: NURSE PRACTITIONER

## 2025-02-10 RX ADMIN — OXYBUTYNIN CHLORIDE 5 MG: 5 TABLET ORAL at 22:13

## 2025-02-10 RX ADMIN — NIFEDIPINE 90 MG: 30 TABLET, FILM COATED, EXTENDED RELEASE ORAL at 09:12

## 2025-02-10 RX ADMIN — HEPARIN SODIUM 5000 UNITS: 5000 INJECTION, SOLUTION INTRAVENOUS; SUBCUTANEOUS at 22:14

## 2025-02-10 RX ADMIN — PANTOPRAZOLE SODIUM 40 MG: 40 TABLET, DELAYED RELEASE ORAL at 06:31

## 2025-02-10 RX ADMIN — OXYBUTYNIN CHLORIDE 5 MG: 5 TABLET ORAL at 09:11

## 2025-02-10 RX ADMIN — DOCUSATE SODIUM 100 MG: 100 CAPSULE, LIQUID FILLED ORAL at 09:11

## 2025-02-10 RX ADMIN — HEPARIN SODIUM 5000 UNITS: 5000 INJECTION, SOLUTION INTRAVENOUS; SUBCUTANEOUS at 14:52

## 2025-02-10 RX ADMIN — ATORVASTATIN CALCIUM 20 MG: 20 TABLET, FILM COATED ORAL at 22:13

## 2025-02-10 RX ADMIN — HEPARIN SODIUM 5000 UNITS: 5000 INJECTION, SOLUTION INTRAVENOUS; SUBCUTANEOUS at 06:29

## 2025-02-10 RX ADMIN — DONEPEZIL HYDROCHLORIDE 10 MG: 5 TABLET ORAL at 22:14

## 2025-02-10 RX ADMIN — CEFTRIAXONE SODIUM 1 G: 1 INJECTION, SOLUTION INTRAVENOUS at 22:14

## 2025-02-10 RX ADMIN — OXYBUTYNIN CHLORIDE 5 MG: 5 TABLET ORAL at 14:52

## 2025-02-10 RX ADMIN — ACETAMINOPHEN 650 MG: 325 TABLET, FILM COATED ORAL at 22:26

## 2025-02-10 ASSESSMENT — COGNITIVE AND FUNCTIONAL STATUS - GENERAL
TURNING FROM BACK TO SIDE WHILE IN FLAT BAD: A LITTLE
DRESSING REGULAR LOWER BODY CLOTHING: A LOT
EATING MEALS: A LITTLE
TURNING FROM BACK TO SIDE WHILE IN FLAT BAD: A LOT
PERSONAL GROOMING: A LITTLE
DRESSING REGULAR UPPER BODY CLOTHING: A LITTLE
TOILETING: A LITTLE
MOVING TO AND FROM BED TO CHAIR: A LOT
PERSONAL GROOMING: A LITTLE
CLIMB 3 TO 5 STEPS WITH RAILING: A LOT
HELP NEEDED FOR BATHING: A LOT
DRESSING REGULAR LOWER BODY CLOTHING: A LITTLE
STANDING UP FROM CHAIR USING ARMS: A LOT
WALKING IN HOSPITAL ROOM: A LOT
MOBILITY SCORE: 12
DAILY ACTIVITIY SCORE: 15
TOILETING: A LITTLE
WALKING IN HOSPITAL ROOM: A LOT
MOBILITY SCORE: 15
CLIMB 3 TO 5 STEPS WITH RAILING: A LOT
HELP NEEDED FOR BATHING: A LITTLE
MOVING FROM LYING ON BACK TO SITTING ON SIDE OF FLAT BED WITH BEDRAILS: A LITTLE
DAILY ACTIVITIY SCORE: 19
DRESSING REGULAR UPPER BODY CLOTHING: A LITTLE
MOBILITY SCORE: 15
MOVING TO AND FROM BED TO CHAIR: A LITTLE
TURNING FROM BACK TO SIDE WHILE IN FLAT BAD: A LITTLE
CLIMB 3 TO 5 STEPS WITH RAILING: TOTAL
WALKING IN HOSPITAL ROOM: A LOT
DAILY ACTIVITIY SCORE: 19
MOVING TO AND FROM BED TO CHAIR: A LITTLE
DRESSING REGULAR UPPER BODY CLOTHING: A LITTLE
PERSONAL GROOMING: A LITTLE
TOILETING: A LOT
STANDING UP FROM CHAIR USING ARMS: A LOT
HELP NEEDED FOR BATHING: A LITTLE
STANDING UP FROM CHAIR USING ARMS: A LOT
DRESSING REGULAR LOWER BODY CLOTHING: A LITTLE

## 2025-02-10 ASSESSMENT — PAIN DESCRIPTION - DESCRIPTORS: DESCRIPTORS: ACHING;PRESSURE;SORE

## 2025-02-10 ASSESSMENT — PAIN SCALES - GENERAL
PAINLEVEL_OUTOF10: 7
PAINLEVEL_OUTOF10: 0 - NO PAIN
PAINLEVEL_OUTOF10: 4
PAINLEVEL_OUTOF10: 7

## 2025-02-10 ASSESSMENT — ACTIVITIES OF DAILY LIVING (ADL)
BATHING_LEVEL_OF_ASSISTANCE: MINIMUM ASSISTANCE
HOME_MANAGEMENT_TIME_ENTRY: 25

## 2025-02-10 ASSESSMENT — PAIN - FUNCTIONAL ASSESSMENT
PAIN_FUNCTIONAL_ASSESSMENT: 0-10

## 2025-02-10 ASSESSMENT — PAIN DESCRIPTION - ORIENTATION: ORIENTATION: LEFT

## 2025-02-10 ASSESSMENT — PAIN DESCRIPTION - LOCATION: LOCATION: LEG

## 2025-02-10 NOTE — PROGRESS NOTES
Physical Therapy    Physical Therapy Treatment    Patient Name: Marlo Carl  MRN: [id number]  Department: [place] [address]  Room: [address] [place] [address]  Today's Date: 2/10/2025  Time Calculation  Start Time: 1547  Stop Time: 1610  Time Calculation (min): 23 min         Assessment/Plan   PT Assessment  PT Assessment Results: Decreased mobility, Impaired balance, Decreased cognition  Rehab Prognosis: Good  Barriers to Discharge Home: Physical needs, Caregiver assistance  Caregiver Assistance: Caregiver assistance needed per identified barriers - however, level of patient's required assistance exceeds assistance available at home  Physical Needs: Stair navigation to access bed limited by function/safety, Stair navigation to access bath limited by function/safety, Ambulating household distances limited by function/safety, 24hr mobility assistance needed, Stair navigation into home limited by function/safety, High falls risk due to function or environment  Evaluation/Treatment Tolerance: Patient tolerated treatment well  Medical Staff Made Aware: Yes  Strengths: Ability to acquire knowledge, Attitude of self, Coping skills, Insight into problems, Premorbid level of function, Rehab experience  Barriers to Participation: Comorbidities, Housing layout  End of Session Communication: Bedside nurse, PCT/NA/CTA  Assessment Comment: Patient seen for follow up visit today; currently requiring mod A for mobility with FWW. Functioning below baseline but very motivated to regain functional I.   Will benefit from ongoing PT in house and MOD intensity  PT at time of DC to maximize his safety and promote functional I.  End of Session Patient Position: Alarm on, Bed, 3 rail up  PT Plan  Inpatient/Swing Bed or Outpatient: Inpatient  PT Plan  Treatment/Interventions: Bed mobility, Transfer training, Gait training, Stair training, Balance training, Neuromuscular re-education, Strengthening, Endurance training, Therapeutic exercise, Positioning, Postural  re-education  PT Plan: Ongoing PT  PT Frequency: 4 times per week  PT Discharge Recommendations: Moderate intensity level of continued care  Equipment Recommended upon Discharge: Wheeled walker  PT Recommended Transfer Status: Assist x1, Assistive device  PT - OK to Discharge: Yes      General Visit Information:   PT  Visit  PT Received On: 02/10/25  Response to Previous Treatment: Patient reporting fatigue but able to participate., Patient with no complaints from previous session.  General  Reason for Referral: 85 y/o M to ED with weakness, N/V, and lethargy following Covid exposure (vs infection?)  Referred By: Kaci Prince, [facility]-CNP  Past Medical History Relevant to Rehab:   Past Medical History:   Diagnosis Date    Anemia     Arthritis     BPH with obstruction/lower urinary tract symptoms     Cerebral vascular accident (Multi)     1996, 2016 w/ residual left sided weakness, CT Head 9/8/22  Large area of encephalomalacia of the right MCA territory, unchanged since prior. 3. Advanced chronic microvascular ischemic changes and chronic left basal ganglia lacunar infarct.    CKD (chronic kidney disease)     Cognitive decline     Dementia     on donzepril    Depression     Hearing aid worn     refuses to wear    HL (hearing loss)     Hypertension     OAB (overactive bladder)     PE (pulmonary thromboembolism) (Multi) 09/07/2018    off anticoags    Resting tremor     mild hands    RLS (restless legs syndrome)     Spinal stenosis     Unspecified urinary incontinence       Family/Caregiver Present: No  Prior to Session Communication: Bedside nurse  Patient Position Received: Alarm on, Up in chair  Preferred Learning Style: auditory, verbal, kinesthetic  General Comment: Sleeping on approach. Awakened quickly. Pleasant and motivated.    Subjective   Precautions:  Precautions  Medical Precautions: Fall precautions    Objective   Pain:  Pain Assessment  Pain Assessment: 0-10  0-10 (Numeric) Pain Score: 7  Pain Type:  "Chronic pain  Pain Location: Buttocks  Pain Descriptors: Aching, Pressure, Sore  Pain Frequency: Constant/continuous  Pain Onset: Ongoing  Clinical Progression: Gradually improving  Effect of Pain on Daily Activities: PT to tolerance, improved with repositoining  Patient's Stated Pain Goal: No pain  Pain Interventions: Repositioned, Ambulation/increased activity, Elevated, Emotional support, Relaxation technique, Rest  Response to Interventions: Content/relaxed, Decrease in pain, Provider notified  Cognition:  Cognition  Overall Cognitive Status: Impaired  Arousal/Alertness: Appropriate responses to stimuli  Orientation Level: Disoriented to time (Able to recall year with increased time, reported was \"January\")  Following Commands: Follows one step commands with increased time  Attention: Within Functional Limits  Safety/Judgement: Within Functional Limits  Insight: Mild  Impulsive: Mildly  Coordination:  Movements are Fluid and Coordinated: No  Upper Body Coordination: Intermittent UE tremors  Coordination Comment: B UE tremors noted  Postural Control:  Postural Control  Postural Control: Within Functional Limits  Posture Comment: flexed trunk with left sidebending when standing  Static Sitting Balance  Static Sitting-Balance Support: No upper extremity supported, Feet supported  Static Sitting-Level of Assistance: Close supervision  Dynamic Sitting Balance  Dynamic Sitting-Balance Support: Feet supported, Bilateral upper extremity supported  Dynamic Sitting-Level of Assistance: Contact guard  Dynamic Sitting-Balance: Forward lean, Lateral lean, Reaching for objects, Trunk control activities  Static Standing Balance  Static Standing-Balance Support: Bilateral upper extremity supported  Static Standing-Level of Assistance: Moderate assistance  Static Standing-Comment/Number of Minutes: with FWW  Dynamic Standing Balance  Dynamic Standing-Balance Support: Bilateral upper extremity supported  Dynamic Standing-Level of " Assistance: Moderate assistance  Dynamic Standing-Balance: Turning  Dynamic Standing-Comments: with FWW  Extremity/Trunk Assessments:   RUE   RUE : Within Functional Limits  LUE   LUE: Within Functional Limits  RLE   RLE : Within Functional Limits  LLE   LLE : Within Functional Limits  Activity Tolerance:  Activity Tolerance  Endurance: Tolerates 10 - 20 min exercise with multiple rests  Activity Tolerance Comments: fair  Treatments:   Therapeutic Activity  Therapeutic Activity Performed: Yes  Therapeutic Activity 1: completed stretching and transfer to bed, rolling and repositioning with increased time; patient with brief soiled with urine, completed brianne care with wipes provided by PT in supine. external pouch catheter reconnected at end of session. RN and CTA aware. increased time for positioning as pt favors L sidelying with increased BLE flexion and trunk flexion. instruction to avoid tightness in order to facilitae mobility.    Balance/Neuromuscular Re-Education  Balance/Neuromuscular Re-Education Activity Performed: Yes  Balance/Neuromuscular Re-Education Activity 1: postural corrections and sequencing completed with sitting and standing activities; see posture/mobility section for details.    Bed Mobility  Bed Mobility: Yes  Bed Mobility 1  Bed Mobility 1: Sitting to supine  Level of Assistance 1: Moderate assistance, Moderate verbal cues, Moderate tactile cues  Bed Mobility Comments 1: increased time and effort to complete transfer to supine. support at BLE and cues for hand placement with bedrail.  Bed Mobility 2  Bed Mobility  2: Rolling right, Rolling left, Scooting  Level of Assistance 2: Set up, Moderate assistance, Moderate verbal cues, Moderate tactile cues  Bed Mobility Comments 2: repositioning in supine with pillow support in L sidelying at end of session. multiple rolls and boosting up.    Ambulation/Gait Training  Ambulation/Gait Training Performed: Yes  Ambulation/Gait Training 1  Surface 1:  Level tile  Device 1: Rolling walker  Gait Support Devices: Gait belt  Assistance 1: Moderate assistance, Moderate verbal cues, Moderate tactile cues  Quality of Gait 1: Narrow base of support, Festinating, Forward flexed posture, Decreased step length, Shuffling gait, Ataxic, Soft knee(s)  Comments/Distance (ft) 1: 5 feet and 2 feet with ongoing cues and A; (+) instability .  Transfers  Transfer: Yes  Transfer 1  Technique 1: Sit to stand  Transfer Device 1: Walker, Gait belt  Transfer Level of Assistance 1: Moderate assistance, Moderate tactile cues, Moderate verbal cues  Trials/Comments 1: impaired concentric strength in prox LE with increased forward flexion and A to steady.  Transfers 2  Transfer From 2: Chair with arms to  Transfer to 2: Bed  Technique 2: Stand pivot, Stand to sit  Transfer Device 2: Walker, Gait belt  Transfer Level of Assistance 2: Moderate assistance, Moderate tactile cues, Moderate verbal cues  Trials/Comments 2: impaired eccentric control with ongoing cues for sequencing and walker positioning.    Outcome Measures:  [facility] Basic Mobility  Turning from your back to your side while in a flat bed without using bedrails: A little  Moving from lying on your back to sitting on the side of a flat bed without using bedrails: A lot  Moving to and from bed to chair (including a wheelchair): A lot  Standing up from a chair using your arms (e.g. wheelchair or bedside chair): A lot  To walk in hospital room: A lot  Climbing 3-5 steps with railing: Total  Basic Mobility - Total Score: 12    [place]  Sitting Balance: Steady, safe  Arises: Unable without help  Attempts to Arise: Unable without help  Immediate Standing Balance (First 5 Seconds): Unsteady (Swaggers, moves feet, trunk sway)  Standing Balance: Unsteady  Nudged: Begins to fall  Eyes Closed: Unsteady  Turned 360 Degrees: Steadiness: Unsteady (Grabs, staggers)  Turned 360 Degrees: Continuity of Steps: Discontinuous steps  Sitting Down: Uses arms  or not a smooth motion  Balance Score: 2  Initiation of Gait: No hesitancy  Step Height: R Swing Foot: Right foot does not clear floor completely with step  Step Length: R Swing Foot: Does not pass left stance foot with step  Step Height: L Swing Foot: Left foot does not clear floor completely with step  Step Length: L Swing Foot: Does not pass right stance foot with step  Step Symmetry: Right and left step appear equal  Step Continuity: Steps appear continuous  Path: Mild/moderate deviation or uses walking aid  Trunk: Marked sway or uses walking aid  Walking Time: Heels almost touching while walking  Gait Score: 5  Total Score: 7    Education Documentation  Body Mechanics, taught by Terra Bethea, PT at 2/10/2025  6:00 PM.  Learner: Patient  Readiness: Acceptance  Method: Explanation, Demonstration  Response: Verbalizes Understanding, Demonstrated Understanding, Needs Reinforcement  Comment: ongoing education re: frequency of mobility, postural corrections, sequencing, transfer & gait training, positoining. see note for further details.    Mobility Training, taught by Terra Bethea, PT at 2/10/2025  6:00 PM.  Learner: Patient  Readiness: Acceptance  Method: Explanation, Demonstration  Response: Verbalizes Understanding, Demonstrated Understanding, Needs Reinforcement  Comment: ongoing education re: frequency of mobility, postural corrections, sequencing, transfer & gait training, positoining. see note for further details.    Education Comments  No comments found.        OP EDUCATION:       Encounter Problems       Encounter Problems (Active)       Balance       STG - Independently maintains dynamic standing balance with upper extremity support while walking. (Progressing)       Start:  02/07/25    Expected End:  02/21/25       INTERVENTIONS:  1. Practice standing with minimal support.  2. Educate patient about standing tolerance.  3. Educate patient about independence with gait, transfers, and ADL's.  4.  Educate patient about use of assistive device.  5. Educate patient about self-directed care.         STG - Maintains static and dynamic sitting balance on EOB without upper extremity support for >15 minutes independently.  (Progressing)       Start:  02/07/25    Expected End:  02/21/25       INTERVENTIONS:  1. Practice sitting on the edge of a bed/mat with minimal support.  2. Educate patient about maintining total hip precautions while maintaining balance.  3. Educate patient about pressure relief.  4. Educate patient about use of assistive device.            Mobility       STG - Patient will ambulate >50' with RW independently on level surfaces.  (Progressing)       Start:  02/07/25    Expected End:  02/21/25            STG - Patient will ascend and descend four to six stairs with 2 rails and SBA. (Not Progressing)       Start:  02/07/25    Expected End:  02/21/25               PT Transfers       STG - Patient to transfer to and from sit to supine independently from flat bed.  (Progressing)       Start:  02/07/25    Expected End:  02/21/25            STG - Patient will transfer sit to and from stand from EOB and chair with arms independently.  (Progressing)       Start:  02/07/25    Expected End:  02/21/25               Pain - Adult

## 2025-02-10 NOTE — CARE PLAN
The clinical goals for the shift include Patient will remain injury free this shift.    Over the shift, the patient did make progress toward the following goals. Barriers to progression include       Problem: Pain - Adult  Goal: Verbalizes/displays adequate comfort level or baseline comfort level  Outcome: Progressing  Flowsheets (Taken 2/10/2025 1123)  Verbalizes/displays adequate comfort level or baseline comfort level:   Encourage patient to monitor pain and request assistance   Assess pain using appropriate pain scale   Administer analgesics based on type and severity of pain and evaluate response   Implement non-pharmacological measures as appropriate and evaluate response   Consider cultural and social influences on pain and pain management   Notify Licensed Independent Practitioner if interventions unsuccessful or patient reports new pain     Problem: Safety - Adult  Goal: Free from fall injury  Outcome: Progressing  Flowsheets (Taken 2/10/2025 1123)  Free from fall injury: Instruct family/caregiver on patient safety     Problem: Discharge Planning  Goal: Discharge to home or other facility with appropriate resources  Outcome: Progressing  Flowsheets (Taken 2/10/2025 1123)  Discharge to home or other facility with appropriate resources:   Identify barriers to discharge with patient and caregiver   Arrange for needed discharge resources and transportation as appropriate   Identify discharge learning needs (meds, wound care, etc)   Arrange for interpreters to assist at discharge as needed   Refer to discharge planning if patient needs post-hospital services based on physician order or complex needs related to functional status, cognitive ability or social support system     Problem: Chronic Conditions and Co-morbidities  Goal: Patient's chronic conditions and co-morbidity symptoms are monitored and maintained or improved  Outcome: Progressing  Flowsheets (Taken 2/10/2025 1123)  Care Plan - Patient's Chronic  Conditions and Co-Morbidity Symptoms are Monitored and Maintained or Improved:   Monitor and assess patient's chronic conditions and comorbid symptoms for stability, deterioration, or improvement   Collaborate with multidisciplinary team to address chronic and comorbid conditions and prevent exacerbation or deterioration   Update acute care plan with appropriate goals if chronic or comorbid symptoms are exacerbated and prevent overall improvement and discharge     Problem: Nutrition  Goal: Nutrient intake appropriate for maintaining nutritional needs  Outcome: Progressing

## 2025-02-10 NOTE — PROGRESS NOTES
02/10/25 1401   Discharge Planning   Expected Discharge Disposition SNF     Called daughter, Surekha, left voicemail.    Addendum: received return call from daughter. Discussed PT OT recommendation of moderate intensity. Daughter agreeable to SNF. SNF list sent to [email or web address]. SW will follow for choices.

## 2025-02-10 NOTE — PROGRESS NOTES
Marlo Carl is a 84 y.o. male     Feels okay  Appetite good  Discussed with patient's daughter  She prefers to go to skilled nursing facility as they have steps in the house which he cannot climb    Review of Systems     Constitutional: no fever, no chills, not feeling poorly, not feeling tired   Cardiovascular: no chest pain   Respiratory: no cough, wheezing or shortness of breath a  Gastrointestinal: no abdominal pain, no constipation, no melena, no nausea, no diarrhea, no vomiting and no blood in stools.   Neurological: no headache,   All other systems have been reviewed and are negative for complaint.       Vitals:    02/10/25 1122   BP: 152/83   Pulse:    Resp: 17   Temp: 36.6 °C (97.9 °F)   SpO2: 100%        Scheduled medications  atorvastatin, 20 mg, oral, Nightly  cefTRIAXone, 1 g, intravenous, q24h  docusate sodium, 100 mg, oral, Daily  donepezil, 10 mg, oral, Nightly  heparin (porcine), 5,000 Units, subcutaneous, q8h  NIFEdipine ER, 90 mg, oral, Daily  oxybutynin, 5 mg, oral, TID  pantoprazole, 40 mg, oral, Daily before breakfast      Continuous medications     PRN medications  PRN medications: acetaminophen, benzonatate, hydrALAZINE, ipratropium-albuteroL, artificial tears, melatonin, ondansetron, polyethylene glycol    Lab Review   Results from last 7 days   Lab Units 02/10/25  0521 02/09/25  0614 02/08/25  0531   WBC AUTO x10*3/uL 4.2* 4.2* 4.3*   HEMOGLOBIN g/dL 10.9* 12.4* 11.8*   HEMATOCRIT % 33.6* 39.3* 37.3*   PLATELETS AUTO x10*3/uL 197 205 200     Results from last 7 days   Lab Units 02/10/25  0521 02/09/25  0614 02/08/25  0531 02/06/25  1459 02/05/25  1502   SODIUM mmol/L 136 135* 139   < > 143   POTASSIUM mmol/L 3.8 3.6 4.1   < > 4.1   CHLORIDE mmol/L 106 104 105   < > 109*   CO2 mmol/L 24 24 26   < > 23   BUN mg/dL 24* 28* 36*   < > 43*   CREATININE mg/dL 1.62* 1.62* 1.95*   < > 2.53*   CALCIUM mg/dL 8.3* 8.2* 8.4*   < > 9.1   PROTEIN TOTAL g/dL  --   --   --   --  7.9   BILIRUBIN TOTAL mg/dL   --   --   --   --  0.4   ALK PHOS U/L  --   --   --   --  76   ALT U/L  --   --   --   --  17   AST U/L  --   --   --   --  27   GLUCOSE mg/dL 87 91 90   < > 122*    < > = values in this interval not displayed.     Results from last 7 days   Lab Units 02/07/25  0552 02/05/25  1643 02/05/25  1502   TROPHS ng/L 121* 200* 210*        CT abdomen pelvis wo IV contrast   Final Result   Diffuse colonic diverticulosis.        Numerous bilateral hypoattenuating lesions suggestive of   hemorrhagic/proteinaceous cysts but incompletely characterized   without contrast.        Mild asymmetric left-sided bladder wall thickening, nonspecific.   Prostatomegaly with questionable TURP defect.        Right adrenal adenoma with stable left adrenal nodule.        Atherosclerosis with abdominal aortic aneurysm.        Patchy and nodular ground-glass opacities in the visualized lower   lungs suggesting atypical infection or pneumonitis.        Signed by: Xin Madsen 2/5/2025 6:21 PM   Dictation workstation:   [id number]      CT head wo IV contrast   Final Result   No evidence of acute cortical infarct or intracranial hemorrhage.        Stable intracranial findings since comparison head CT 09/08/2022.        MACRO:   None             Signed by: Dani Kingsley 2/5/2025 3:33 PM   Dictation workstation:   [id number]      XR chest 2 views   Final Result   No focal infiltrate or pneumothorax.        MACRO:   None.        Signed by: Eliot Caceres 2/5/2025 3:13 PM   Dictation workstation:   [id number]            Physical Exam    Constitutional   General appearance: Alert and in no acute distress.   Pulmonary   Respiratory assessment: No respiratory distress, normal respiratory rhythm and effort.    Auscultation of Lungs: Clear bilateral breath sounds.   Cardiovascular   Auscultation of heart: Apical pulse normal, heart rate and rhythm normal, normal S1 and S2, no murmurs and no pericardial rub.    Exam for edema: No peripheral edema.    Abdomen   Abdominal Exam: No bruits, normal bowel sounds, soft, non-tender, no abdominal mass palpated.    Liver and Spleen exam: No hepato-splenomegaly.   Musculoskeletal     Inspection/palpation of joints, bones and muscles: No joint swelling. Normal movement of all extremities.   Neurologic   Cranial nerves: Nerves 2-12 were intact, no focal neuro defects.         Assessment/Plan      #Atypical pneumonia versus walking pneumonia  Improving  Continue IV antibiotics     #Acute kidney injury from dehydration  Continue to encourage fluids  Ruled out UTI    #Elevated troponin  Flat trend likely demand ischemia from the infection     Hypertension  Hold medications for systolic blood pressure less than 120     #Dyslipidemia  Continue statins     #Dementia  Appears to be at his baseline

## 2025-02-10 NOTE — PROGRESS NOTES
Occupational Therapy    OT Treatment    Patient Name: Marlo Carl  MRN: [id number]  Department: [facility] A 5  Room: [address] [place] [address]  Today's Date: 2/10/2025  Time Calculation  Start Time: 0920  Stop Time: 0959  Time Calculation (min): 39 min        Assessment:  OT Assessment: Pt progressing toward OT tx goals, able to complete seated ADL tasks with cues for sequencing and demo increased time to complete all tasks. Pt demonstrating decrease FMC due to set-up assist with packages to complete ADL tasks (feeding, grooming and LB dressing)  Prognosis: Good  Barriers to Discharge Home: Caregiver assistance, Cognition needs, Physical needs  Caregiver Assistance: Caregiver assistance needed per identified barriers - however, level of patient's required assistance exceeds assistance available at home  Cognition Needs: Insight of patient limited regarding functional ability/needs, 24hr supervision for safety awareness needed  Physical Needs: 24hr mobility assistance needed, 24hr ADL assistance needed, High falls risk due to function or environment  Evaluation/Treatment Tolerance: Patient limited by fatigue, Patient tolerated treatment well  Medical Staff Made Aware: Yes  End of Session Communication: Bedside nurse  End of Session Patient Position: Up in chair, Alarm on  OT Assessment Results: Decreased ADL status, Decreased upper extremity range of motion, Decreased safe judgment during ADL, Decreased cognition, Decreased endurance, Decreased fine motor control, Decreased functional mobility, Decreased IADLs  Prognosis: Good  Barriers to Discharge: Inaccessible home environment  Evaluation/Treatment Tolerance: Patient limited by fatigue, Patient tolerated treatment well  Medical Staff Made Aware: Yes  Plan:  Treatment Interventions: ADL retraining, Functional transfer training, UE strengthening/ROM, Endurance training, Patient/family training, Equipment evaluation/education, Fine motor coordination activities, Compensatory technique  education  OT Frequency: 3 times per week  OT Discharge Recommendations: Moderate intensity level of continued care  OT Recommended Transfer Status: Assist of 1  OT - OK to Discharge: Yes (Per POC)  Treatment Interventions: ADL retraining, Functional transfer training, UE strengthening/ROM, Endurance training, Patient/family training, Equipment evaluation/education, Fine motor coordination activities, Compensatory technique education    Subjective   Previous Visit Info:  OT Last Visit  OT Received On: 02/10/25  General:  General  Reason for Referral: 85 y/o M to ED with weakness, N/V, and lethargy following Covid exposure (vs infection?)  Referred By: Kaci Prince, APRN-CNP  Past Medical History Relevant to Rehab:   Past Medical History:   Diagnosis Date    Anemia     Arthritis     BPH with obstruction/lower urinary tract symptoms     Cerebral vascular accident (Multi)     1996, 2016 w/ residual left sided weakness, CT Head 9/8/22  Large area of encephalomalacia of the right MCA territory, unchanged since prior. 3. Advanced chronic microvascular ischemic changes and chronic left basal ganglia lacunar infarct.    CKD (chronic kidney disease)     Cognitive decline     Dementia     on donzepril    Depression     Hearing aid worn     refuses to wear    HL (hearing loss)     Hypertension     OAB (overactive bladder)     PE (pulmonary thromboembolism) (Multi) 09/07/2018    off anticoags    Resting tremor     mild hands    RLS (restless legs syndrome)     Spinal stenosis     Unspecified urinary incontinence      Family/Caregiver Present: No  Prior to Session Communication: Bedside nurse  Patient Position Received: Bed, 3 rail up, Alarm on  Preferred Learning Style: auditory, verbal  General Comment: Pt pleasant and agreeable to OT eval.  Precautions:  Medical Precautions: Fall precautions     Date/Time Vitals Session Patient Position Pulse Resp SpO2 BP MAP (mmHg)    02/10/25 0920 During OT  --  74  --  --  --  --        "          Pain:  Pain Assessment  Pain Assessment: 0-10  0-10 (Numeric) Pain Score: 0 - No pain    Objective    Cognition:  Cognition  Orientation Level: Disoriented to time (Able to recall year with increased time, reported was \"January\")  Following Commands: Follows one step commands with increased time  Coordination:  Movements are Fluid and Coordinated: No  Upper Body Coordination: Intermittent UE tremors  Activities of Daily Living: Feeding  Feeding Level of Assistance: Setup  Feeding Where Assessed: Chair  Feeding Comments: Set-up assist with opening packaging (i.e, straw)    Grooming  Grooming Level of Assistance: Setup, Close supervision  Grooming Where Assessed: Chair  Grooming Comments: Completed oral care(set-up assist for opening packages), washed face with washcloth with set-up assist and applied deodorant SBA.    UE Bathing  UE Bathing Level of Assistance: Minimum assistance  UE Bathing Where Assessed:  (Chair)  UE Bathing Comments: Completed UE/UB sponge bathing, mod cues for thoroughness and sequencing.    LE Bathing  LE Bathing Level of Assistance: Minimum assistance  LE Bathing Where Assessed:  (Chair)  LE Bathing Comments: Completed LE/LB sponge bathing, mod cues for thoroughness and sequencing.    UE Dressing  UE Dressing Level of Assistance: Minimum assistance  UE Dressing Where Assessed: Chair  UE Dressing Comments: Fasteners, don over shoulders. Cues for doffing/donning hospital gown.    LE Dressing  LE Dressing: Yes  Sock Level of Assistance: Minimum assistance (Assist with donning over toes of LLE sock. Increased time to don RLE sock with cues.)  Adult Briefs Level of Assistance: Moderate assistance (Mod A for managing over hips due unsteadiness.)  LE Dressing Where Assessed: Chair    Toileting  Toileting Level of Assistance: Maximum assistance  Toileting Comments: Purewick in place     Bed Mobility/Transfers: Bed Mobility  Bed Mobility: Yes  Bed Mobility 1  Bed Mobility 1: Supine to " sitting  Level of Assistance 1: Close supervision  Bed Mobility Comments 1: Able to manage BLE and trunk toward EOB    Transfers  Transfer: Yes  Transfer 1  Transfer From 1: Bed to  Transfer to 1: Stand  Technique 1: Sit to stand, Stand to sit  Transfer Device 1: Walker, Gait belt  Transfer Level of Assistance 1: Minimum assistance  Trials/Comments 1: x3 trials, Min A to elevate and for steadiness/balance. Cues for safe hand placement and body positioning. 1/3 trials, stand>sit, pt with retropulsion required Mod A to sit onto bed.  Transfers 2  Transfer From 2: Stand to  Transfer to 2: Chair with arms  Technique 2: Stand pivot (~4-5 lateral side steps from bed>chair.)  Transfer Device 2: Walker, Gait belt  Transfer Level of Assistance 2: Minimum assistance, Moderate assistance  Trials/Comments 2: Pt able to complete lateral side steps ~4-5 from bed to chair, initial Min A however progressed to Mod A due to unsteadiness and balance. Shakiness noted.    Sitting Balance:  Static Sitting Balance  Static Sitting-Balance Support: Feet supported  Static Sitting-Level of Assistance: Close supervision  Static Sitting-Comment/Number of Minutes: EOB/chair  Dynamic Sitting Balance  Dynamic Sitting-Balance Support: Feet supported  Dynamic Sitting-Level of Assistance: Close supervision  Dynamic Sitting-Balance: Forward lean, Reaching across midline  Dynamic Sitting-Comments: chair  Standing Balance:  Static Standing Balance  Static Standing-Balance Support: Bilateral upper extremity supported  Static Standing-Level of Assistance: Contact guard, Minimum assistance  Static Standing-Comment/Number of Minutes: With FWW  Dynamic Standing Balance  Dynamic Standing-Balance Support: Bilateral upper extremity supported  Dynamic Standing-Level of Assistance: Minimum assistance, Moderate assistance  Dynamic Standing-Balance: Turning  Dynamic Standing-Comments: With FWW    Therapy/Activity: Therapeutic Activity  Therapeutic Activity  Performed: Yes  Therapeutic Activity 1: Pt completed multiple sit<>stand transfers while completing LB dressing/ADL tasks.    Outcome Measures:[facility] Daily Activity  Putting on and taking off regular lower body clothing: A lot  Bathing (including washing, rinsing, drying): A lot  Putting on and taking off regular upper body clothing: A little  Toileting, which includes using toilet, bedpan or urinal: A lot  Taking care of personal grooming such as brushing teeth: A little  Eating Meals: A little  Daily Activity - Total Score: 15        Education Documentation  Body Mechanics, taught by Lisa Saleem OT at 2/10/2025 10:56 AM.  Learner: Patient  Readiness: Acceptance  Method: Explanation  Response: Verbalizes Understanding, Needs Reinforcement    ADL Training, taught by Lisa Saleem OT at 2/10/2025 10:56 AM.  Learner: Patient  Readiness: Acceptance  Method: Explanation  Response: Verbalizes Understanding, Needs Reinforcement    Education Comments  No comments found.        OP EDUCATION:       Goals:  Encounter Problems       Encounter Problems (Active)       ADLs       Patient will perform sponge UB and LB bathing in seating with stand by assist level of assistance. (Progressing)       Start:  02/07/25    Expected End:  02/21/25            Patient with complete lower body dressing with contact guard assist level of assistance donning and doffing all LE clothes  with PRN adaptive equipment while supported sitting and edge of bed. (Progressing)       Start:  02/07/25    Expected End:  02/21/25            Patient will complete daily grooming tasks with set-up level of assistance and PRN adaptive equipment while supported sitting and edge of bed . (Progressing)       Start:  02/07/25    Expected End:  02/21/25            Patient will complete toileting including hygiene clothing management/hygiene with contact guard assist level of assistance and bedside commode. (Progressing)       Start:  02/07/25    Expected End:   02/21/25               MOBILITY       Patient will perform Functional mobility x Household distances/Community Distances with contact guard assist level of assistance and front wheeled walker in order to improve safety and functional mobility. (Progressing)       Start:  02/07/25    Expected End:  02/21/25               TRANSFERS       Patient will perform bed mobility supervision level of assistance and bed rails in order to improve safety and independence with mobility (Progressing)       Start:  02/07/25    Expected End:  02/21/25            Patient will complete sit to stand transfer with stand by assist level of assistance and front wheeled walker in order to improve safety and prepare for out of bed mobility. (Progressing)       Start:  02/07/25    Expected End:  02/21/25

## 2025-02-10 NOTE — PROGRESS NOTES
Marlo Carl is a 84 y.o. male on day 5 of admission presenting with Generalized weakness.    Plan: continues treatment for PNA and weakness with IV abx, therapy evaluations complete. Will need follow up with daughter and patient regarding their preference on SNF placement.   Disposition: New SNF/AUTH needed  Barrier: SNF choices, acceptance, AUTH  ADOD:  1-2 days       02/10/25 0812   Discharge Planning   Expected Discharge Disposition SNF       Angelica Canseco RN

## 2025-02-10 NOTE — CARE PLAN
The clinical goals for the shift include Patient will remain injury free this shift.      Problem: Pain - Adult  Goal: Verbalizes/displays adequate comfort level or baseline comfort level  Outcome: Progressing     Problem: Safety - Adult  Goal: Free from fall injury  Outcome: Progressing     Problem: Discharge Planning  Goal: Discharge to home or other facility with appropriate resources  Outcome: Progressing     Problem: Chronic Conditions and Co-morbidities  Goal: Patient's chronic conditions and co-morbidity symptoms are monitored and maintained or improved  Outcome: Progressing     Problem: Nutrition  Goal: Nutrient intake appropriate for maintaining nutritional needs  Outcome: Progressing

## 2025-02-11 LAB
ANION GAP SERPL CALC-SCNC: 10 MMOL/L (ref 10–20)
BUN SERPL-MCNC: 21 MG/DL (ref 6–23)
CALCIUM SERPL-MCNC: 8.5 MG/DL (ref 8.6–10.3)
CHLORIDE SERPL-SCNC: 105 MMOL/L (ref 98–107)
CO2 SERPL-SCNC: 25 MMOL/L (ref 21–32)
CREAT SERPL-MCNC: 1.55 MG/DL (ref 0.5–1.3)
EGFRCR SERPLBLD CKD-EPI 2021: 44 ML/MIN/1.73M*2
ERYTHROCYTE [DISTWIDTH] IN BLOOD BY AUTOMATED COUNT: 13.9 % (ref 11.5–14.5)
GLUCOSE SERPL-MCNC: 80 MG/DL (ref 74–99)
HCT VFR BLD AUTO: 34.2 % (ref 41–52)
HGB BLD-MCNC: 11.1 G/DL (ref 13.5–17.5)
MCH RBC QN AUTO: 27.5 PG (ref 26–34)
MCHC RBC AUTO-ENTMCNC: 32.5 G/DL (ref 32–36)
MCV RBC AUTO: 85 FL (ref 80–100)
NRBC BLD-RTO: 0 /100 WBCS (ref 0–0)
PLATELET # BLD AUTO: 227 X10*3/UL (ref 150–450)
POTASSIUM SERPL-SCNC: 3.8 MMOL/L (ref 3.5–5.3)
RBC # BLD AUTO: 4.03 X10*6/UL (ref 4.5–5.9)
SODIUM SERPL-SCNC: 136 MMOL/L (ref 136–145)
WBC # BLD AUTO: 4.1 X10*3/UL (ref 4.4–11.3)

## 2025-02-11 PROCEDURE — 80048 BASIC METABOLIC PNL TOTAL CA: CPT | Performed by: NURSE PRACTITIONER

## 2025-02-11 PROCEDURE — 92526 ORAL FUNCTION THERAPY: CPT | Mod: GN

## 2025-02-11 PROCEDURE — 99231 SBSQ HOSP IP/OBS SF/LOW 25: CPT | Performed by: INTERNAL MEDICINE

## 2025-02-11 PROCEDURE — 2500000002 HC RX 250 W HCPCS SELF ADMINISTERED DRUGS (ALT 637 FOR MEDICARE OP, ALT 636 FOR OP/ED)

## 2025-02-11 PROCEDURE — 2500000001 HC RX 250 WO HCPCS SELF ADMINISTERED DRUGS (ALT 637 FOR MEDICARE OP): Performed by: NURSE PRACTITIONER

## 2025-02-11 PROCEDURE — 36415 COLL VENOUS BLD VENIPUNCTURE: CPT | Performed by: NURSE PRACTITIONER

## 2025-02-11 PROCEDURE — 1100000001 HC PRIVATE ROOM DAILY

## 2025-02-11 PROCEDURE — 2500000001 HC RX 250 WO HCPCS SELF ADMINISTERED DRUGS (ALT 637 FOR MEDICARE OP): Performed by: INTERNAL MEDICINE

## 2025-02-11 PROCEDURE — 85027 COMPLETE CBC AUTOMATED: CPT | Performed by: NURSE PRACTITIONER

## 2025-02-11 PROCEDURE — 2500000004 HC RX 250 GENERAL PHARMACY W/ HCPCS (ALT 636 FOR OP/ED): Performed by: INTERNAL MEDICINE

## 2025-02-11 PROCEDURE — 2500000002 HC RX 250 W HCPCS SELF ADMINISTERED DRUGS (ALT 637 FOR MEDICARE OP, ALT 636 FOR OP/ED): Performed by: NURSE PRACTITIONER

## 2025-02-11 PROCEDURE — 2500000004 HC RX 250 GENERAL PHARMACY W/ HCPCS (ALT 636 FOR OP/ED)

## 2025-02-11 PROCEDURE — 97116 GAIT TRAINING THERAPY: CPT | Mod: GP,CQ

## 2025-02-11 PROCEDURE — 97110 THERAPEUTIC EXERCISES: CPT | Mod: GP,CQ

## 2025-02-11 RX ADMIN — OXYBUTYNIN CHLORIDE 5 MG: 5 TABLET ORAL at 21:06

## 2025-02-11 RX ADMIN — HEPARIN SODIUM 5000 UNITS: 5000 INJECTION, SOLUTION INTRAVENOUS; SUBCUTANEOUS at 21:06

## 2025-02-11 RX ADMIN — DONEPEZIL HYDROCHLORIDE 10 MG: 5 TABLET ORAL at 21:06

## 2025-02-11 RX ADMIN — ACETAMINOPHEN 650 MG: 325 TABLET, FILM COATED ORAL at 23:35

## 2025-02-11 RX ADMIN — PANTOPRAZOLE SODIUM 40 MG: 40 TABLET, DELAYED RELEASE ORAL at 05:48

## 2025-02-11 RX ADMIN — OXYBUTYNIN CHLORIDE 5 MG: 5 TABLET ORAL at 14:28

## 2025-02-11 RX ADMIN — HEPARIN SODIUM 5000 UNITS: 5000 INJECTION, SOLUTION INTRAVENOUS; SUBCUTANEOUS at 14:29

## 2025-02-11 RX ADMIN — ATORVASTATIN CALCIUM 20 MG: 20 TABLET, FILM COATED ORAL at 21:06

## 2025-02-11 RX ADMIN — CEFTRIAXONE SODIUM 1 G: 1 INJECTION, SOLUTION INTRAVENOUS at 21:06

## 2025-02-11 RX ADMIN — NIFEDIPINE 90 MG: 30 TABLET, FILM COATED, EXTENDED RELEASE ORAL at 08:42

## 2025-02-11 RX ADMIN — OXYBUTYNIN CHLORIDE 5 MG: 5 TABLET ORAL at 08:42

## 2025-02-11 RX ADMIN — DOCUSATE SODIUM 100 MG: 100 CAPSULE, LIQUID FILLED ORAL at 08:41

## 2025-02-11 RX ADMIN — HEPARIN SODIUM 5000 UNITS: 5000 INJECTION, SOLUTION INTRAVENOUS; SUBCUTANEOUS at 05:48

## 2025-02-11 ASSESSMENT — COGNITIVE AND FUNCTIONAL STATUS - GENERAL
MOVING FROM LYING ON BACK TO SITTING ON SIDE OF FLAT BED WITH BEDRAILS: A LITTLE
STANDING UP FROM CHAIR USING ARMS: A LITTLE
MOVING FROM LYING ON BACK TO SITTING ON SIDE OF FLAT BED WITH BEDRAILS: A LITTLE
WALKING IN HOSPITAL ROOM: A LITTLE
MOBILITY SCORE: 16
DRESSING REGULAR UPPER BODY CLOTHING: A LITTLE
DAILY ACTIVITIY SCORE: 19
HELP NEEDED FOR BATHING: A LITTLE
MOVING TO AND FROM BED TO CHAIR: A LITTLE
MOBILITY SCORE: 16
WALKING IN HOSPITAL ROOM: A LOT
TURNING FROM BACK TO SIDE WHILE IN FLAT BAD: A LITTLE
PERSONAL GROOMING: A LITTLE
TURNING FROM BACK TO SIDE WHILE IN FLAT BAD: A LITTLE
DRESSING REGULAR LOWER BODY CLOTHING: A LITTLE
TOILETING: A LITTLE
CLIMB 3 TO 5 STEPS WITH RAILING: A LOT
CLIMB 3 TO 5 STEPS WITH RAILING: TOTAL
MOVING TO AND FROM BED TO CHAIR: A LITTLE
STANDING UP FROM CHAIR USING ARMS: A LITTLE

## 2025-02-11 ASSESSMENT — PAIN SCALES - GENERAL
PAINLEVEL_OUTOF10: 7
PAINLEVEL_OUTOF10: 0 - NO PAIN
PAINLEVEL_OUTOF10: 0 - NO PAIN

## 2025-02-11 ASSESSMENT — PAIN - FUNCTIONAL ASSESSMENT: PAIN_FUNCTIONAL_ASSESSMENT: 0-10

## 2025-02-11 NOTE — CARE PLAN
The patient's goals for the shift include      The clinical goals for the shift include patient will remain free of falls      Problem: Pain - Adult  Goal: Verbalizes/displays adequate comfort level or baseline comfort level  2/11/2025 0151 by Kaci Smith RN  Outcome: Progressing  2/11/2025 0150 by Kaci Smith RN  Outcome: Progressing     Problem: Safety - Adult  Goal: Free from fall injury  2/11/2025 0151 by Kaci Smith RN  Outcome: Progressing  2/11/2025 0150 by Kaci Smith RN  Outcome: Progressing     Problem: Discharge Planning  Goal: Discharge to home or other facility with appropriate resources  2/11/2025 0151 by Kaci Smith RN  Outcome: Progressing  2/11/2025 0150 by Kaci Smith RN  Outcome: Progressing     Problem: Chronic Conditions and Co-morbidities  Goal: Patient's chronic conditions and co-morbidity symptoms are monitored and maintained or improved  2/11/2025 0151 by Kaci Smith RN  Outcome: Progressing  2/11/2025 0150 by Kaci Smith RN  Outcome: Progressing     Problem: Nutrition  Goal: Nutrient intake appropriate for maintaining nutritional needs  2/11/2025 0151 by Kaci Smith RN  Outcome: Progressing  2/11/2025 0150 by Kaci Smith RN  Outcome: Progressing

## 2025-02-11 NOTE — PROGRESS NOTES
Physical Therapy    Physical Therapy Treatment    Patient Name: Marlo Carl  MRN: [id number]  Department: [place] [address]  Room: [address] [place] [address]  Today's Date: 2/11/2025  Time Calculation  Start Time: 1433  Stop Time: 1458  Time Calculation (min): 25 min         Assessment/Plan   PT Assessment  Rehab Prognosis: Good  Barriers to Discharge Home: Physical needs, Caregiver assistance  Caregiver Assistance: Caregiver assistance needed per identified barriers - however, level of patient's required assistance exceeds assistance available at home  Physical Needs: Stair navigation to access bed limited by function/safety, Stair navigation to access bath limited by function/safety, Ambulating household distances limited by function/safety, 24hr mobility assistance needed, Stair navigation into home limited by function/safety, High falls risk due to function or environment  End of Session Communication: Bedside nurse  Assessment Comment: Pt demonstrates improvements in mobility and requires decreased physical assist this date. Poor gait mechanics noted and poor safety, placing pt at greater risk of falls.  End of Session Patient Position: Bed, 3 rail up, Alarm on  PT Plan  Inpatient/Swing Bed or Outpatient: Inpatient  PT Plan  Treatment/Interventions: Bed mobility, Transfer training, Gait training  PT Plan: Ongoing PT  PT Frequency: 4 times per week  PT Discharge Recommendations: Moderate intensity level of continued care  Equipment Recommended upon Discharge: Wheeled walker  PT Recommended Transfer Status: Assist x1  PT - OK to Discharge: Yes (per POC)      General Visit Information:   PT  Visit  PT Received On: 02/11/25  General  Reason for Referral: 83 y/o M to ED with weakness, N/V, and lethargy following Covid exposure (vs infection?)  Referred By: Kaci Prince, APRN-CNP  Past Medical History Relevant to Rehab:   Past Medical History:   Diagnosis Date    Anemia     Arthritis     BPH with obstruction/lower urinary tract symptoms      Cerebral vascular accident (Multi)     1996, 2016 w/ residual left sided weakness, CT Head 9/8/22  Large area of encephalomalacia of the right MCA territory, unchanged since prior. 3. Advanced chronic microvascular ischemic changes and chronic left basal ganglia lacunar infarct.    CKD (chronic kidney disease)     Cognitive decline     Dementia     on donzepril    Depression     Hearing aid worn     refuses to wear    HL (hearing loss)     Hypertension     OAB (overactive bladder)     PE (pulmonary thromboembolism) (Multi) 09/07/2018    off anticoags    Resting tremor     mild hands    RLS (restless legs syndrome)     Spinal stenosis     Unspecified urinary incontinence        Prior to Session Communication: Bedside nurse  Patient Position Received: Up in chair, Alarm on    Subjective   Precautions:  Precautions  Medical Precautions: Fall precautions     Date/Time Vitals Session Patient Position Pulse Resp SpO2 BP MAP (mmHg)    02/11/25 1520 --  --  --  16  100 %  142/76  98                 Objective   Pain:  Pain Assessment  Pain Assessment: 0-10  0-10 (Numeric) Pain Score: 0 - No pain  Cognition:  Cognition  Orientation Level: Oriented X4       Postural Control:  Static Sitting Balance  Static Sitting-Balance Support: Feet supported, Bilateral upper extremity supported  Static Sitting-Level of Assistance: Distant supervision  Dynamic Sitting Balance  Dynamic Sitting-Balance Support: Feet supported  Dynamic Sitting-Level of Assistance: Close supervision  Dynamic Sitting-Balance: Lateral lean, Forward lean, Reaching for objects, Reaching across midline  Dynamic Sitting-Comments: No overt LOB noted, pt able to perform and return to midline without physical assist. VC required.  Static Standing Balance  Static Standing-Balance Support: Bilateral upper extremity supported  Static Standing-Level of Assistance: Close supervision  Dynamic Standing Balance  Dynamic Standing-Balance Support: Left upper extremity supported,  Right upper extremity supported  Dynamic Standing-Level of Assistance: Contact guard  Dynamic Standing-Balance: Lateral lean, Forward lean, Reaching for objects, Reaching across midline  Dynamic Standing-Comments: No overt LOB noted, pt able to perform without physical assist and able to return to midline with VC only.       Treatments:  Therapeutic Exercise  Therapeutic Exercise Performed: Yes  Therapeutic Exercise Activity 1: Pt performs seated x 15 reps AP, LAQ, and alternating marches. Pt requires VC for pacing and technique. Pt unable to achieve TKE with LAQ    Therapeutic Activity  Therapeutic Activity Performed: Yes  Therapeutic Activity 1: Pt performs seated and standing dynamic balance reaching activities. No overt LOB noted and no physical assist required to return to midline.    Bed Mobility  Bed Mobility: Yes  Bed Mobility 1  Bed Mobility 1: Sitting to supine  Level of Assistance 1: Close supervision  Bed Mobility Comments 1: No physical assist required to perform    Ambulation/Gait Training  Ambulation/Gait Training Performed: Yes  Ambulation/Gait Training 1  Surface 1: Level tile  Device 1: Rolling walker  Gait Support Devices: Gait belt  Assistance 1: Minimum assistance  Quality of Gait 1: Narrow base of support, Festinating, Forward flexed posture, Decreased step length, Shuffling gait, Ataxic, Soft knee(s)  Comments/Distance (ft) 1: 15 x 4 (Seated rest break required in between each ambulation distance. Pt unable to achieve reciprocal gait pattern. Max VC to remain within PHILIPP of WW. WW management required and max VC for WW management and safety.)  Transfers  Transfer: Yes  Transfer 1  Transfer From 1: Bed to  Transfer to 1: Stand  Technique 1: Sit to stand  Transfer Device 1: Walker, Gait belt  Transfer Level of Assistance 1: Contact guard  Trials/Comments 1: Max VC for correct hand placement and upright posture. Pt able to perform without physical assist. No overt LOB noted. VC required on WW  management before performing stand>sit. x5 trials performed.    Outcome Measures:  [facility] Basic Mobility  Turning from your back to your side while in a flat bed without using bedrails: A little  Moving from lying on your back to sitting on the side of a flat bed without using bedrails: A little  Moving to and from bed to chair (including a wheelchair): A little  Standing up from a chair using your arms (e.g. wheelchair or bedside chair): A little  To walk in hospital room: A little  Climbing 3-5 steps with railing: Total  Basic Mobility - Total Score: 16    Education Documentation  Body Mechanics, taught by Treasure Carrillo PTA at 2/11/2025  3:56 PM.  Learner: Patient  Readiness: Acceptance  Method: Explanation  Response: Verbalizes Understanding    Mobility Training, taught by Treasure Carrillo PTA at 2/11/2025  3:56 PM.  Learner: Patient  Readiness: Acceptance  Method: Explanation  Response: Verbalizes Understanding    Education Comments  No comments found.           Encounter Problems       Encounter Problems (Active)       Balance       STG - Independently maintains dynamic standing balance with upper extremity support while walking. (Progressing)       Start:  02/07/25    Expected End:  02/21/25       INTERVENTIONS:  1. Practice standing with minimal support.  2. Educate patient about standing tolerance.  3. Educate patient about independence with gait, transfers, and ADL's.  4. Educate patient about use of assistive device.  5. Educate patient about self-directed care.         STG - Maintains static and dynamic sitting balance on EOB without upper extremity support for >15 minutes independently.  (Progressing)       Start:  02/07/25    Expected End:  02/21/25       INTERVENTIONS:  1. Practice sitting on the edge of a bed/mat with minimal support.  2. Educate patient about maintining total hip precautions while maintaining balance.  3. Educate patient about pressure relief.  4. Educate patient about use  of assistive device.            Mobility       STG - Patient will ambulate >50' with RW independently on level surfaces.  (Progressing)       Start:  02/07/25    Expected End:  02/21/25            STG - Patient will ascend and descend four to six stairs with 2 rails and SBA. (Not Progressing)       Start:  02/07/25    Expected End:  02/21/25               PT Transfers       STG - Patient to transfer to and from sit to supine independently from flat bed.  (Progressing)       Start:  02/07/25    Expected End:  02/21/25            STG - Patient will transfer sit to and from stand from EOB and chair with arms independently.  (Progressing)       Start:  02/07/25    Expected End:  02/21/25               Pain - Adult

## 2025-02-11 NOTE — PROGRESS NOTES
02/11/25 1359   Discharge Planning   Expected Discharge Disposition SNF     Spoke with patient's daughter- King Shane karl QUINTANILLA.

## 2025-02-11 NOTE — PROGRESS NOTES
Speech-Language Pathology    SLP Adult Inpatient  Speech-Language Pathology Treatment     Patient Name: Marlo Carl  MRN: [id number]  Today's Date: 2/11/2025  Time Calculation  Start Time: 0849  Stop Time: 0905  Time Calculation (min): 16 min       Recommendations:  Cont Regular Diet  Assist meal tray setup    Given current respiratory concerns, please continue aspiration precautions    If Pt demonstrates overt s/s aspiration or develops worsening respiratory functioning, please re-c/s SLP.       Dysphagia goals - Est'd at eval 2/7  Goal: 1. Patient will consume prescribed diet with no overt s/s aspiration 100% of the time. - Per reports, achieved this date  2. Patient will implement safe swallowing strategies to reduce risk of aspiration in 90% of trials given caregiver assistance/cueing as needed. - Achieved this date  3. Patient will complete modified barium swallow study (MBSS) as warranted upon further assessment/discussion with medical team - Not indicated/applicable     Long Term Goals:  KMLTDYSGOALS: Patient will tolerate the least restrictive diet without overt difficulty or further pulmonary compromise by time of discharge. - Per reports and observation during tx, achieved this date      SLP Assessment:  SLP TX Intervention Outcome: Making Progress Towards Goals  Treatment Tolerance: Patient tolerated treatment well  Education Provided: Yes    POC goals achieved this date, and no further ST indicated at this time. Please re-consult as needed.       Plan:  SLP TX Plan: Discharge from Speech Therapy  SLP Plan: No skilled SLP  Discussed POC: Patient  Discussed Risks/Benefits: Yes  Patient/Caregiver Agreeable: Yes  SLP - OK to Discharge: Yes      Subjective     General Visit Information:  Patient Seen During This Visit: Yes  Prior to Session Communication: Bedside nurse  Pt seen bedside for dysphagia tx. Pt sitting up in chair at bedside eating breakfast.   Pt benefited from setup assist (centering tray on  table, cut up large food items to bite-size) to be able to feed himself independently (uses only Right hand).  Pt/nsg report no concerns re: diet, swallow, or worsening respiratory status this date; no new chest imaging noted since prior to CSE.      Baseline Assessment:  Respiratory Status: Room air  Behavior/Cognition: Alert, Cooperative, Pleasant mood  Patient Positioning: Upright in Chair       Pain Assessment:  Pt did not report or demo evidence of pain or discomfort during study.       Objective     Therapeutic Swallow:  Therapeutic Swallow Intervention : Compensatory Strategies, PO Trials  Solid Diet Recommendations: Regular (IDDSI Level 7)  Liquid Diet Recommendations: Thin (IDDSI Level 0)    During tx, Pt was observed feeding himself during meal. Pt ate variety of food textures from tray, and by end of session, Pt had eaten approx 50% of items available.    Pt trialed: 100% fresh fruit cup (including large chunks), half pancake, 2x large bites scrambled eggs, and approx 4-6oz thin liquids by straw.    Cough observed only once during meal; unable to determine if related to current pneumonia vs overt s/s aspiration, however respiratory status remained stable throughout tx, without other overt s/s aspiration or SOB noted.     Mild oral residue noted between bites, but cleared with liquid wash.    Safe swallow strategies reviewed during tx for Pt safety.       Patient Education:  Pt reports comprehension of all information discussed, including aspiration risks/precautions (including concern for pneumonia), safe swallow strategies, and diet rec'ds.

## 2025-02-11 NOTE — PROGRESS NOTES
Marlo Carl is a 84 y.o. male     Patient is stable  Medically clear for discharge to skilled nursing facility    Review of Systems     Constitutional: no fever, no chills, not feeling poorly, not feeling tired   Cardiovascular: no chest pain   Respiratory: no cough, wheezing or shortness of breath a  Gastrointestinal: no abdominal pain, no constipation, no melena, no nausea, no diarrhea, no vomiting and no blood in stools.   Neurological: no headache,   All other systems have been reviewed and are negative for complaint.       Vitals:    02/11/25 1520   BP: 142/76   Pulse:    Resp: 16   Temp: 37.2 °C (99 °F)   SpO2: 100%        Scheduled medications  atorvastatin, 20 mg, oral, Nightly  cefTRIAXone, 1 g, intravenous, q24h  docusate sodium, 100 mg, oral, Daily  donepezil, 10 mg, oral, Nightly  heparin (porcine), 5,000 Units, subcutaneous, q8h  NIFEdipine ER, 90 mg, oral, Daily  oxybutynin, 5 mg, oral, TID  pantoprazole, 40 mg, oral, Daily before breakfast      Continuous medications     PRN medications  PRN medications: acetaminophen, benzonatate, hydrALAZINE, ipratropium-albuteroL, artificial tears, melatonin, ondansetron, polyethylene glycol    Lab Review   Results from last 7 days   Lab Units 02/11/25  0559 02/10/25  0521 02/09/25  0614   WBC AUTO x10*3/uL 4.1* 4.2* 4.2*   HEMOGLOBIN g/dL 11.1* 10.9* 12.4*   HEMATOCRIT % 34.2* 33.6* 39.3*   PLATELETS AUTO x10*3/uL 227 197 205     Results from last 7 days   Lab Units 02/11/25  0559 02/10/25  0521 02/09/25  0614 02/06/25  1459 02/05/25  1502   SODIUM mmol/L 136 136 135*   < > 143   POTASSIUM mmol/L 3.8 3.8 3.6   < > 4.1   CHLORIDE mmol/L 105 106 104   < > 109*   CO2 mmol/L 25 24 24   < > 23   BUN mg/dL 21 24* 28*   < > 43*   CREATININE mg/dL 1.55* 1.62* 1.62*   < > 2.53*   CALCIUM mg/dL 8.5* 8.3* 8.2*   < > 9.1   PROTEIN TOTAL g/dL  --   --   --   --  7.9   BILIRUBIN TOTAL mg/dL  --   --   --   --  0.4   ALK PHOS U/L  --   --   --   --  76   ALT U/L  --   --   --    --  17   AST U/L  --   --   --   --  27   GLUCOSE mg/dL 80 87 91   < > 122*    < > = values in this interval not displayed.     Results from last 7 days   Lab Units 02/07/25  0552 02/05/25  1643 02/05/25  1502   TROPHS ng/L 121* 200* 210*        CT abdomen pelvis wo IV contrast   Final Result   Diffuse colonic diverticulosis.        Numerous bilateral hypoattenuating lesions suggestive of   hemorrhagic/proteinaceous cysts but incompletely characterized   without contrast.        Mild asymmetric left-sided bladder wall thickening, nonspecific.   Prostatomegaly with questionable TURP defect.        Right adrenal adenoma with stable left adrenal nodule.        Atherosclerosis with abdominal aortic aneurysm.        Patchy and nodular ground-glass opacities in the visualized lower   lungs suggesting atypical infection or pneumonitis.        Signed by: Xin Madsen 2/5/2025 6:21 PM   Dictation workstation:   [id number]      CT head wo IV contrast   Final Result   No evidence of acute cortical infarct or intracranial hemorrhage.        Stable intracranial findings since comparison head CT 09/08/2022.        MACRO:   None             Signed by: Dani Kingsley 2/5/2025 3:33 PM   Dictation workstation:   [id number]      XR chest 2 views   Final Result   No focal infiltrate or pneumothorax.        MACRO:   None.        Signed by: Eliot Caceres 2/5/2025 3:13 PM   Dictation workstation:   [id number]            Physical Exam    Constitutional   General appearance: Alert and in no acute distress.   Pulmonary   Respiratory assessment: No respiratory distress, normal respiratory rhythm and effort.    Auscultation of Lungs: Clear bilateral breath sounds.   Cardiovascular   Auscultation of heart: Apical pulse normal, heart rate and rhythm normal, normal S1 and S2, no murmurs and no pericardial rub.    Exam for edema: No peripheral edema.   Abdomen   Abdominal Exam: No bruits, normal bowel sounds, soft, non-tender, no abdominal  mass palpated.    Liver and Spleen exam: No hepato-splenomegaly.   Musculoskeletal     Inspection/palpation of joints, bones and muscles: No joint swelling. Normal movement of all extremities.   Neurologic   Cranial nerves: Nerves 2-12 were intact, no focal neuro defects.         Assessment/Plan      #Atypical pneumonia versus walking pneumonia  Improving  Continue IV antibiotics     #Acute kidney injury from dehydration  Improving  Encourage oral hydration         Hypertension  Hold medications for systolic blood pressure less than 120     #Dyslipidemia  Continue statins     #Dementia  Appears to be at his baseline

## 2025-02-11 NOTE — PROGRESS NOTES
02/11/25 0854   Discharge Planning   Expected Discharge Disposition SNF     I did talk to the patient's daughter Surekha at [phone]  in regards to discharge planing, she did receive the SNF list and is looking over the list for referrals to be sent, she will either call myself back or she will call SW back today by 1p with referrals for the patient. I will continue to monitor for discharge planing.    10:29 Patients' daughter would like referrals sent to [facility] care of [facility], [facility], Peri huang [place] and [facility], I will ask our DSC team to build referrals.    14:56  pending ref#[id number]

## 2025-02-11 NOTE — CARE PLAN
The clinical goals for the shift include patient will remain free of falls    Over the shift, the patient did make progress toward the following goals. Barriers to progression include       Problem: Pain - Adult  Goal: Verbalizes/displays adequate comfort level or baseline comfort level  Outcome: Progressing  Flowsheets (Taken 2/11/2025 0842)  Verbalizes/displays adequate comfort level or baseline comfort level:   Encourage patient to monitor pain and request assistance   Assess pain using appropriate pain scale   Administer analgesics based on type and severity of pain and evaluate response   Implement non-pharmacological measures as appropriate and evaluate response   Consider cultural and social influences on pain and pain management   Notify Licensed Independent Practitioner if interventions unsuccessful or patient reports new pain     Problem: Safety - Adult  Goal: Free from fall injury  Outcome: Progressing  Flowsheets (Taken 2/11/2025 0842)  Free from fall injury: Instruct family/caregiver on patient safety     Problem: Discharge Planning  Goal: Discharge to home or other facility with appropriate resources  Outcome: Progressing  Flowsheets (Taken 2/11/2025 0842)  Discharge to home or other facility with appropriate resources:   Identify barriers to discharge with patient and caregiver   Arrange for needed discharge resources and transportation as appropriate   Identify discharge learning needs (meds, wound care, etc)   Arrange for interpreters to assist at discharge as needed   Refer to discharge planning if patient needs post-hospital services based on physician order or complex needs related to functional status, cognitive ability or social support system     Problem: Chronic Conditions and Co-morbidities  Goal: Patient's chronic conditions and co-morbidity symptoms are monitored and maintained or improved  Outcome: Progressing  Flowsheets (Taken 2/11/2025 0842)  Care Plan - Patient's Chronic Conditions  and Co-Morbidity Symptoms are Monitored and Maintained or Improved:   Monitor and assess patient's chronic conditions and comorbid symptoms for stability, deterioration, or improvement   Collaborate with multidisciplinary team to address chronic and comorbid conditions and prevent exacerbation or deterioration   Update acute care plan with appropriate goals if chronic or comorbid symptoms are exacerbated and prevent overall improvement and discharge     Problem: Nutrition  Goal: Nutrient intake appropriate for maintaining nutritional needs  Outcome: Progressing

## 2025-02-12 VITALS
HEART RATE: 53 BPM | OXYGEN SATURATION: 99 % | WEIGHT: 170 LBS | BODY MASS INDEX: 23.8 KG/M2 | RESPIRATION RATE: 17 BRPM | TEMPERATURE: 98.6 F | DIASTOLIC BLOOD PRESSURE: 73 MMHG | HEIGHT: 71 IN | SYSTOLIC BLOOD PRESSURE: 143 MMHG

## 2025-02-12 LAB
ANION GAP SERPL CALC-SCNC: 10 MMOL/L (ref 10–20)
BUN SERPL-MCNC: 22 MG/DL (ref 6–23)
CALCIUM SERPL-MCNC: 8.6 MG/DL (ref 8.6–10.3)
CHLORIDE SERPL-SCNC: 104 MMOL/L (ref 98–107)
CO2 SERPL-SCNC: 28 MMOL/L (ref 21–32)
CREAT SERPL-MCNC: 1.57 MG/DL (ref 0.5–1.3)
EGFRCR SERPLBLD CKD-EPI 2021: 43 ML/MIN/1.73M*2
ERYTHROCYTE [DISTWIDTH] IN BLOOD BY AUTOMATED COUNT: 13.9 % (ref 11.5–14.5)
GLUCOSE SERPL-MCNC: 84 MG/DL (ref 74–99)
HCT VFR BLD AUTO: 37 % (ref 41–52)
HGB BLD-MCNC: 11.8 G/DL (ref 13.5–17.5)
MCH RBC QN AUTO: 26.8 PG (ref 26–34)
MCHC RBC AUTO-ENTMCNC: 31.9 G/DL (ref 32–36)
MCV RBC AUTO: 84 FL (ref 80–100)
NRBC BLD-RTO: 0 /100 WBCS (ref 0–0)
PLATELET # BLD AUTO: 257 X10*3/UL (ref 150–450)
POTASSIUM SERPL-SCNC: 3.9 MMOL/L (ref 3.5–5.3)
RBC # BLD AUTO: 4.41 X10*6/UL (ref 4.5–5.9)
SODIUM SERPL-SCNC: 138 MMOL/L (ref 136–145)
WBC # BLD AUTO: 4 X10*3/UL (ref 4.4–11.3)

## 2025-02-12 PROCEDURE — 36415 COLL VENOUS BLD VENIPUNCTURE: CPT | Performed by: NURSE PRACTITIONER

## 2025-02-12 PROCEDURE — 85027 COMPLETE CBC AUTOMATED: CPT | Performed by: NURSE PRACTITIONER

## 2025-02-12 PROCEDURE — 99239 HOSP IP/OBS DSCHRG MGMT >30: CPT | Performed by: INTERNAL MEDICINE

## 2025-02-12 PROCEDURE — 2500000002 HC RX 250 W HCPCS SELF ADMINISTERED DRUGS (ALT 637 FOR MEDICARE OP, ALT 636 FOR OP/ED): Performed by: NURSE PRACTITIONER

## 2025-02-12 PROCEDURE — 80048 BASIC METABOLIC PNL TOTAL CA: CPT | Performed by: NURSE PRACTITIONER

## 2025-02-12 PROCEDURE — 2500000004 HC RX 250 GENERAL PHARMACY W/ HCPCS (ALT 636 FOR OP/ED)

## 2025-02-12 PROCEDURE — 2500000001 HC RX 250 WO HCPCS SELF ADMINISTERED DRUGS (ALT 637 FOR MEDICARE OP): Performed by: NURSE PRACTITIONER

## 2025-02-12 PROCEDURE — 2500000002 HC RX 250 W HCPCS SELF ADMINISTERED DRUGS (ALT 637 FOR MEDICARE OP, ALT 636 FOR OP/ED)

## 2025-02-12 PROCEDURE — 2500000001 HC RX 250 WO HCPCS SELF ADMINISTERED DRUGS (ALT 637 FOR MEDICARE OP): Performed by: INTERNAL MEDICINE

## 2025-02-12 PROCEDURE — 2500000004 HC RX 250 GENERAL PHARMACY W/ HCPCS (ALT 636 FOR OP/ED): Performed by: NURSE PRACTITIONER

## 2025-02-12 RX ORDER — POLYETHYLENE GLYCOL 3350 17 G/17G
17 POWDER, FOR SOLUTION ORAL DAILY PRN
Start: 2025-02-12

## 2025-02-12 RX ORDER — OXYBUTYNIN CHLORIDE 5 MG/1
5 TABLET ORAL 3 TIMES DAILY
Start: 2025-02-12

## 2025-02-12 RX ORDER — NIFEDIPINE 30 MG/1
90 TABLET, FILM COATED, EXTENDED RELEASE ORAL DAILY
Status: DISCONTINUED | OUTPATIENT
Start: 2025-02-12 | End: 2025-02-12 | Stop reason: HOSPADM

## 2025-02-12 RX ORDER — BENZONATATE 100 MG/1
100 CAPSULE ORAL 3 TIMES DAILY PRN
Start: 2025-02-12

## 2025-02-12 RX ORDER — IPRATROPIUM BROMIDE AND ALBUTEROL SULFATE 2.5; .5 MG/3ML; MG/3ML
3 SOLUTION RESPIRATORY (INHALATION) EVERY 2 HOUR PRN
Start: 2025-02-12

## 2025-02-12 RX ORDER — ACETAMINOPHEN 325 MG/1
650 TABLET ORAL EVERY 6 HOURS PRN
Start: 2025-02-12

## 2025-02-12 RX ADMIN — POLYETHYLENE GLYCOL 3350 17 G: 17 POWDER, FOR SOLUTION ORAL at 05:38

## 2025-02-12 RX ADMIN — DOCUSATE SODIUM 100 MG: 100 CAPSULE, LIQUID FILLED ORAL at 08:06

## 2025-02-12 RX ADMIN — PANTOPRAZOLE SODIUM 40 MG: 40 TABLET, DELAYED RELEASE ORAL at 05:39

## 2025-02-12 RX ADMIN — OXYBUTYNIN CHLORIDE 5 MG: 5 TABLET ORAL at 08:06

## 2025-02-12 RX ADMIN — HEPARIN SODIUM 5000 UNITS: 5000 INJECTION, SOLUTION INTRAVENOUS; SUBCUTANEOUS at 05:38

## 2025-02-12 RX ADMIN — NIFEDIPINE 90 MG: 30 TABLET, FILM COATED, EXTENDED RELEASE ORAL at 08:06

## 2025-02-12 RX ADMIN — ACETAMINOPHEN 650 MG: 325 TABLET, FILM COATED ORAL at 05:38

## 2025-02-12 ASSESSMENT — COGNITIVE AND FUNCTIONAL STATUS - GENERAL
DRESSING REGULAR LOWER BODY CLOTHING: A LITTLE
TOILETING: A LITTLE
DAILY ACTIVITIY SCORE: 19
MOVING TO AND FROM BED TO CHAIR: A LITTLE
DRESSING REGULAR UPPER BODY CLOTHING: A LITTLE
HELP NEEDED FOR BATHING: A LITTLE
WALKING IN HOSPITAL ROOM: A LITTLE
CLIMB 3 TO 5 STEPS WITH RAILING: A LOT
STANDING UP FROM CHAIR USING ARMS: A LITTLE
PERSONAL GROOMING: A LITTLE
MOBILITY SCORE: 19

## 2025-02-12 ASSESSMENT — PAIN SCALES - GENERAL
PAINLEVEL_OUTOF10: 4
PAINLEVEL_OUTOF10: 0 - NO PAIN

## 2025-02-12 ASSESSMENT — PAIN - FUNCTIONAL ASSESSMENT
PAIN_FUNCTIONAL_ASSESSMENT: 0-10
PAIN_FUNCTIONAL_ASSESSMENT: 0-10

## 2025-02-12 NOTE — CARE PLAN
The clinical goals for the shift include Pt will remain free of falls    Over the shift, the patient did make progress toward the following goals. Barriers to progression include      Problem: Pain - Adult  Goal: Verbalizes/displays adequate comfort level or baseline comfort level  Outcome: Progressing  Flowsheets (Taken 2/12/2025 0959)  Verbalizes/displays adequate comfort level or baseline comfort level:   Encourage patient to monitor pain and request assistance   Assess pain using appropriate pain scale   Administer analgesics based on type and severity of pain and evaluate response   Implement non-pharmacological measures as appropriate and evaluate response   Consider cultural and social influences on pain and pain management   Notify Licensed Independent Practitioner if interventions unsuccessful or patient reports new pain     Problem: Safety - Adult  Goal: Free from fall injury  Outcome: Progressing  Flowsheets (Taken 2/12/2025 0959)  Free from fall injury: Instruct family/caregiver on patient safety     Problem: Discharge Planning  Goal: Discharge to home or other facility with appropriate resources  Outcome: Progressing  Flowsheets (Taken 2/12/2025 0959)  Discharge to home or other facility with appropriate resources:   Identify barriers to discharge with patient and caregiver   Arrange for needed discharge resources and transportation as appropriate   Identify discharge learning needs (meds, wound care, etc)   Arrange for interpreters to assist at discharge as needed   Refer to discharge planning if patient needs post-hospital services based on physician order or complex needs related to functional status, cognitive ability or social support system     Problem: Chronic Conditions and Co-morbidities  Goal: Patient's chronic conditions and co-morbidity symptoms are monitored and maintained or improved  Outcome: Progressing  Flowsheets (Taken 2/12/2025 0959)  Care Plan - Patient's Chronic Conditions and  Co-Morbidity Symptoms are Monitored and Maintained or Improved:   Monitor and assess patient's chronic conditions and comorbid symptoms for stability, deterioration, or improvement   Collaborate with multidisciplinary team to address chronic and comorbid conditions and prevent exacerbation or deterioration   Update acute care plan with appropriate goals if chronic or comorbid symptoms are exacerbated and prevent overall improvement and discharge     Problem: Nutrition  Goal: Nutrient intake appropriate for maintaining nutritional needs  Outcome: Progressing

## 2025-02-12 NOTE — PROGRESS NOTES
02/12/25 0813   Discharge Planning   Expected Discharge Disposition SNF     approved through 2/14 NRD 2/14 auth#[id number] auth has been approved for [facility], will let provider know and continue to monitor for discharge planing.    08:30 Patient will discharge to [facility], facility is aware and able to accept , I have requested transportation through our DSC team with a requested time of 10a, facility # [phone] I have also updated bedside nurse with above information.

## 2025-02-12 NOTE — DISCHARGE SUMMARY
Discharge Diagnosis  Generalized weakness    Issues Requiring Follow-Up  Therapy    Test Results Pending At Discharge  Pending Labs       No current pending labs.            Hospital Course   Patient with a past medical history of HTN, HLD, BPH with LUTS, CKD III. Lumbar Stenosis, Pulmonary Embolism, hx of CVA with Vascular Dementia, OAB, Incontinence, Parkinsonism was doing well until about a week ago when he was exposed to COVID at his house he was mildly symptomatic but this progressed to increasing weakness and lethargy along with nausea and vomiting and loss of appetite  Was brought to the emergency room where workup suggests possible pneumonia  We ruled out a UTI  Continue antibiotics for the pneumonia  Patient showed steady improvement with improved strength  However physical therapy does recommend skilled nursing facility for rehab  Breathing has improved and his appetite is much better  Dementia is also at baseline    Will discharge the patient to skilled nursing facility for therapy  He will complete the course of antibiotics for total 7 days  Resume his home medications as before    Pertinent Physical Exam At Time of Discharge  Physical Exam    Constitutional   General appearance: Alert and in no acute distress.     Pulmonary   Respiratory assessment: No respiratory distress, normal respiratory rhythm and effort.    Auscultation of Lungs: Clear bilateral breath sounds.   Cardiovascular   Auscultation of heart: Apical pulse normal, heart rate and rhythm normal, normal S1 and S2, no murmurs and no pericardial rub.    Exam for edema: No peripheral edema.   Abdomen   Abdominal Exam: No bruits, normal bowel sounds, soft, non-tender, no abdominal mass palpated.    Liver and Spleen exam: No hepato-splenomegaly.   Musculoskeletal     Inspection/palpation of joints, bones and muscles: No joint swelling. Normal movement of all extremities.   Skin   Skin inspection: Normal skin color and pigmentation, normal skin  turgor and no visible rash.   Neurologic   Cranial nerves: Nerves 2-12 were intact, no focal neuro defects.    Home Medications     Medication List      START taking these medications     acetaminophen 325 mg tablet; Commonly known as: Tylenol; Take 2 tablets   (650 mg) by mouth every 6 hours if needed for mild pain (1 - 3).   benzonatate 100 mg capsule; Commonly known as: Tessalon; Take 1 capsule   (100 mg) by mouth 3 times a day as needed for cough. Do not crush or chew.   ipratropium-albuteroL 0.5-2.5 mg/3 mL nebulizer solution; Commonly known   as: Duo-Neb; Take 3 mL by nebulization every 2 hours if needed for   wheezing or shortness of breath.   lubricating eye drops ophthalmic solution; Administer 1 drop into both   eyes 3 times a day as needed for dry eyes.   oxybutynin 5 mg tablet; Commonly known as: Ditropan; Take 1 tablet (5   mg) by mouth 3 times a day.; Replaces: mirabegron 50 mg tablet extended   release 24 hr 24 hr tablet   polyethylene glycol 17 gram packet; Commonly known as: Glycolax,   Miralax; Take 17 g by mouth once daily as needed (constipation).     CONTINUE taking these medications     atorvastatin 20 mg tablet; Commonly known as: Lipitor; Take 1 tablet (20   mg) by mouth once daily.   CENTRUM ADULTS ORAL   docusate sodium 100 mg capsule; Commonly known as: Colace   donepezil 10 mg tablet; Commonly known as: Aricept; Take 1 tablet (10   mg) by mouth once daily at bedtime.   krill oil 500 mg capsule   melatonin 5 mg capsule   NIFEdipine ER 90 mg 24 hr tablet; Commonly known as: Adalat CC; Take 1   tablet (90 mg) by mouth once daily.     STOP taking these medications     carbidopa-levodopa  mg tablet; Commonly known as: Sinemet   mirabegron 50 mg tablet extended release 24 hr 24 hr tablet; Commonly   known as: Myrbetriq; Replaced by: oxybutynin 5 mg tablet   oxyCODONE 5 mg immediate release tablet; Commonly known as: Roxicodone       Outpatient Follow-Up  Future Appointments   Date Time  Provider Department [place]   2/25/2025  3:50 PM Vishal Delgado MD [facility]   2/25/2025  4:45 PM George Crain MD [id number] [facility]   6/18/2025  1:30 PM Yaw Dowd MD [id number] [facility]     Patient seen at bedside. Events from the last visit reviewed. Discussed with staff. Results of tests and investigations from last visit reviewed and discussed with patient/Family. Electronic chart on [facility] reviewed. Input / Recommendations  from consultants  appreciated and reviewed and agreed with.     discharge summary and profile completed. medications reviewed and discussed with patient and family.  scripts completed and signed.     total discharge time in excess of 30 minutes.    George Crain MD

## 2025-02-13 NOTE — PROGRESS NOTES
02/13/25 1110   Discharge Planning   Expected Discharge Disposition SNF     Patient was discharged to [facility] , I did receive a call from the patient's daughter Surekha in regards to the care the patient has been receiving at facility, I advised them I would reach out to facility liaison Maribell at [phone] to have the DON reach out and talk to the family regarding concerns at the facility.

## 2025-02-19 NOTE — DOCUMENTATION CLARIFICATION NOTE
PATIENT:               [facility]ATH[id number]  MRN:                       [id number]  :                       1940  ADMIT DATE:       2025 4:25 PM  DISCH DATE:        2025 2:43 PM  RESPONDING PROVIDER #:        [id number]          PROVIDER RESPONSE TEXT:    Acute Myocardial Injury without ischemia    CDI QUERY TEXT:    Clarification        Instruction:    Based on your assessment of the patient and the clinical information, please provide the requested documentation by clicking on the appropriate radio button and enter any additional information if prompted.    Question: Is there a diagnosis indicative of the lab values or image study    When answering this query, please exercise your independent professional judgment. The fact that a question is being asked, does not imply that any particular answer is desired or expected.    The patient's clinical indicators include:  Clinical Information:  85 y/o [place] [facility] [place], diarrhea. DX Atypical PNA, ALYSSIA.      Clinical Indicators:  02/10/25 Medicine note per KEYLA Crain MD: ?Elevated troponin Flat trend likely demand ischemia from the infection?      ED: T 37.6, HR 79, RR 18, SpO2 99, 120/86  Labs  - 02/10:  Troponin 210 - 200 - 121    No chest pain or shortness of breath complaints during admission    25 ED note per JIM Bethea MD: ?ECG interpreted by me: Sinus rhythm with first-degree AV block, QRS 96, rate 70, QTc 444. No ST changes meeting STEMI criteria.? / ?ECG showing no ischemic changes and patient denying any chest pain currently, patient has had elevated troponins in the 100-200s range on previous testing and this appears to be around his baseline, lowering my suspicion for an occlusive myocardial infarction.?    No Cardiology consult  No ECHO in this admission      Treatment: SQ Heparin  - 02/10, PO Nifedipine  - 02/10, No cardiac interventions noted  Risk Factors: PMH: HTN,  CKD, BPH, Dementia, Recurrent UTIs  Options provided:  -- Acute Myocardial Injury without ischemia  -- Other - I will add my own diagnosis  -- Refer to Clinical Documentation Reviewer    Query created by: Terri Coley on 2/10/2025 4:11 PM      Electronically signed by:  JORGE L STEVENS MD 2/19/2025 9:23 AM

## 2025-02-25 ENCOUNTER — APPOINTMENT (OUTPATIENT)
Dept: UROLOGY | Facility: HOSPITAL | Age: 85
End: 2025-02-25
Payer: MEDICARE

## 2025-02-26 LAB
ATRIAL RATE: 70 BPM
P AXIS: -23 DEGREES
P OFFSET: 146 MS
P ONSET: 99 MS
PR INTERVAL: 228 MS
Q ONSET: 213 MS
QRS COUNT: 12 BEATS
QRS DURATION: 96 MS
QT INTERVAL: 412 MS
QTC CALCULATION(BAZETT): 444 MS
QTC FREDERICIA: 433 MS
R AXIS: -11 DEGREES
T AXIS: -22 DEGREES
T OFFSET: 419 MS
VENTRICULAR RATE: 70 BPM

## 2025-03-17 ENCOUNTER — DOCUMENTATION (OUTPATIENT)
Dept: PHYSICAL THERAPY | Facility: CLINIC | Age: 85
End: 2025-03-17
Payer: MEDICARE

## 2025-03-17 NOTE — PROGRESS NOTES
Physical Therapy    Discharge Summary    Name: Marlo Carl  MRN: [id number]  : 1940  Date: 25    Discharge Summary: PT    The patient will be discharged at this time due to inactivity. The patient has not been seen for outpatient therapy in 30+ days and has not made contact to reschedule. The patient will require new referral/evaluation to resume therapy in the future.     The patient will be discharged at this time. Please refer to initial evaluation or re-assessment for last goals/objective measures assessment and progress report.    Thank you for this referral. For any questions on this patient’s course of therapy, please call the clinic for clarification.       Rehab Discharge Reason: Failed to schedule and/or keep follow-up appointment(s)

## 2025-05-02 DIAGNOSIS — I10 PRIMARY HYPERTENSION: ICD-10-CM

## 2025-05-02 DIAGNOSIS — E78.49 OTHER HYPERLIPIDEMIA: ICD-10-CM

## 2025-05-02 RX ORDER — ATORVASTATIN CALCIUM 20 MG/1
20 TABLET, FILM COATED ORAL DAILY
Qty: 90 TABLET | Refills: 0 | Status: SHIPPED | OUTPATIENT
Start: 2025-05-02 | End: 2025-07-31

## 2025-05-02 RX ORDER — NIFEDIPINE 90 MG/1
90 TABLET, EXTENDED RELEASE ORAL DAILY
Qty: 90 TABLET | Refills: 2 | Status: SHIPPED | OUTPATIENT
Start: 2025-05-02 | End: 2026-01-27

## 2025-05-02 NOTE — TELEPHONE ENCOUNTER
Rx Refill Request Telephone Encounter    Name:  Marlo Siddhartha  :  [id number]  Specific Pharmacy location:  [facility]

## 2025-06-18 ENCOUNTER — OFFICE VISIT (OUTPATIENT)
Dept: NEUROLOGY | Facility: CLINIC | Age: 85
End: 2025-06-18
Payer: MEDICARE

## 2025-06-18 VITALS
SYSTOLIC BLOOD PRESSURE: 152 MMHG | WEIGHT: 165.8 LBS | HEART RATE: 66 BPM | BODY MASS INDEX: 23.12 KG/M2 | TEMPERATURE: 97.7 F | DIASTOLIC BLOOD PRESSURE: 91 MMHG

## 2025-06-18 DIAGNOSIS — F02.A0 MILD LEWY BODY DEMENTIA WITHOUT BEHAVIORAL DISTURBANCE, PSYCHOTIC DISTURBANCE, MOOD DISTURBANCE, OR ANXIETY (MULTI): ICD-10-CM

## 2025-06-18 DIAGNOSIS — F01.A0 MILD VASCULAR DEMENTIA WITHOUT BEHAVIORAL DISTURBANCE, PSYCHOTIC DISTURBANCE, MOOD DISTURBANCE, OR ANXIETY: Primary | ICD-10-CM

## 2025-06-18 DIAGNOSIS — G31.83 MILD LEWY BODY DEMENTIA WITHOUT BEHAVIORAL DISTURBANCE, PSYCHOTIC DISTURBANCE, MOOD DISTURBANCE, OR ANXIETY (MULTI): ICD-10-CM

## 2025-06-18 PROCEDURE — 3077F SYST BP >= 140 MM HG: CPT | Performed by: PSYCHIATRY & NEUROLOGY

## 2025-06-18 PROCEDURE — 1159F MED LIST DOCD IN RCRD: CPT | Performed by: PSYCHIATRY & NEUROLOGY

## 2025-06-18 PROCEDURE — 3080F DIAST BP >= 90 MM HG: CPT | Performed by: PSYCHIATRY & NEUROLOGY

## 2025-06-18 PROCEDURE — 1126F AMNT PAIN NOTED NONE PRSNT: CPT | Performed by: PSYCHIATRY & NEUROLOGY

## 2025-06-18 PROCEDURE — 1157F ADVNC CARE PLAN IN RCRD: CPT | Performed by: PSYCHIATRY & NEUROLOGY

## 2025-06-18 PROCEDURE — 99214 OFFICE O/P EST MOD 30 MIN: CPT | Performed by: PSYCHIATRY & NEUROLOGY

## 2025-06-18 PROCEDURE — 99214 OFFICE O/P EST MOD 30 MIN: CPT | Mod: GC | Performed by: PSYCHIATRY & NEUROLOGY

## 2025-06-18 PROCEDURE — 1036F TOBACCO NON-USER: CPT | Performed by: PSYCHIATRY & NEUROLOGY

## 2025-06-18 RX ORDER — CARBIDOPA AND LEVODOPA 25; 100 MG/1; MG/1
TABLET ORAL
Qty: 90 TABLET | Refills: 2 | Status: SHIPPED | OUTPATIENT
Start: 2025-06-18

## 2025-06-18 ASSESSMENT — PAIN SCALES - GENERAL: PAINLEVEL_OUTOF10: 0-NO PAIN

## 2025-06-18 ASSESSMENT — ENCOUNTER SYMPTOMS
LOSS OF SENSATION IN FEET: 0
DEPRESSION: 0
OCCASIONAL FEELINGS OF UNSTEADINESS: 1

## 2025-06-18 ASSESSMENT — PATIENT HEALTH QUESTIONNAIRE - PHQ9
2. FEELING DOWN, DEPRESSED OR HOPELESS: NOT AT ALL
SUM OF ALL RESPONSES TO PHQ9 QUESTIONS 1 AND 2: 1
1. LITTLE INTEREST OR PLEASURE IN DOING THINGS: SEVERAL DAYS

## 2025-06-18 NOTE — PROGRESS NOTES
Start Time:1:43pm  Stop Time:2:11pm    Chart reviewed, including physician notes and diagnostic studies, for  3 minutes prior to this appointment.    Accompanied by: daughter Nataliia    Formulation: Mr. Carl is a very-pleasant 85 y.o. 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 He has been having staring spells with alterations in consciousness.  An EEG did not evidence epileptiform findings, so we suspect these are fluctuations in consciousness consistent with DLB. His parkinsonian features have also progressed, with new freezing episodes and ongoing increased tone axially and in LUE, cogwheeling in LUE and bradykinesia. Sinemet was recommended at his last visit though this was not yet started. After discussing they would like to try this. We also recommend PT. Donepezil has been discontinued, after review of EKGs, we recommend not to restart due to bradycardia.     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   Then increase to half a tablet three times a day for one week.   Then increase to 0.5 tablet in the morning, 0.5 tablet at lunch and a full tablet at bedtime for one week.   Then increase to 0.5 tablet in the morning, a full tablet at lunch, and a full tablet at bedtime for one week.   Then increase to a full tablet three times daily.  -Recommend to wear hearing aids   -Referral for PT   -Donepezil has been discontinued due to bradycardia  -Follow up in 3-4 months or sooner if  "needed    --------------------------------------------------------------------------------------------------------    History of Present Illness:  I last saw the patient in November 2024, at which time we started Sinemet, did an EEG, and referred to physical therapy. EEG was consistent with a mild diffuse encephalopathy.    Hospitalized earlier this year (feb) due to weakness during COVID infection. He was unable to get PT.     He is having staring episodes much less frequently, only one per week at the most. He fell out of bed two night ago.     He is not taking donepezil because the prescription lapsed. Last two heart rates were 53 and 58.     He never started Sinemet because they were unsure if he could take a med three times per day. He is having slower movements and taking longer to get started in the morning. He is having freezing episodes at a specific point by the stairs.     No hallucinations. Mood is \"upset at nothing sometimes\" but usually pretty calm. Endorses grief due to loss of two of his brothers 1.5 years ago. Eating well but eating without utensils more frequently. Wakes up multiple times per night due to incontinence.     Allergies[1]    Current Medications[2]    Review of Systems  As per HPI, otherwise all other systems have been reviewed are negative for complaint.      Objective   There were no vitals taken for this visit.    EXAMINATION:  Mental Status Examination  General appearance: well kept, fair eye contact, cooperative  Orientation: oriented to person and situation  Motor: psychomotor retardation, gait is parkinsonian with rollator  Speech: soft  Mood: euthymic  Affect: Congruent with mood and topic of conversation  Passive death wish: No  Suicidal ideation: No  Thought process: logical, linear, and goal-directed  Thought content: Hallucinations: no, Delusions: none, denied; and not responding to internal stimuli  Insight/Judgment: Fair/Fair  Fund of knowledge: Fair  Recent and remote " memory: Poor/Fair  Attention span and concentration: Fair  Language: fluent    MoCA (11/2024)  Visuospatial/Executive: 0  Naming: 3  Memory (Score '0' as this is an Unscored Section): 0  Attention: Read List of Digits: 1  Attention: Read List of Letters: 0  Attention: Serial Sevens: 0  Language: Repeat: 0  Language: Fluency: 1 (1 word)  Abstraction: 0  Delayed Recall: 0 (MIS = 2)  Orientation: 3  Add 1 Point if </=12 yr Education: 0 (pt said he dropped out of first year of college)  MOCA Total Score: 8    MoCA (4/2019): 11/30    Neuro examination:  Bradykinetic finger taps bilaterally with minimal amplitude and pauses  Increased tone in LUE>RUE  Increased tone in neck  Bradykinetic foot taps bilaterally  Rest tremor in LUE  Masked face  Stooped posture  Gait slow and shuffling with rollator    I saw and evaluated the patient. I personally obtained the key and critical portions of the history and physical exam or was physically present for key and critical portions performed by the resident/fellow. I reviewed the resident/fellow's documentation and discussed the patient with the resident/fellow. I agree with the resident/fellow's medical decision making as documented in the note.    Yaw Dowd MD        [1] No Known Allergies  [2]   Current Outpatient Medications:     acetaminophen (Tylenol) 325 mg tablet, Take 2 tablets (650 mg) by mouth every 6 hours if needed for mild pain (1 - 3)., Disp: , Rfl:     atorvastatin (Lipitor) 20 mg tablet, Take 1 tablet (20 mg) by mouth once daily., Disp: 90 tablet, Rfl: 0    benzonatate (Tessalon) 100 mg capsule, Take 1 capsule (100 mg) by mouth 3 times a day as needed for cough. Do not crush or chew., Disp: , Rfl:     docusate sodium (Colace) 100 mg capsule, Take 1 capsule (100 mg) by mouth once daily., Disp: , Rfl:     donepezil (Aricept) 10 mg tablet, Take 1 tablet (10 mg) by mouth once daily at bedtime., Disp: 30 tablet, Rfl: 5    ipratropium-albuteroL (Duo-Neb) 0.5-2.5  mg/3 mL nebulizer solution, Take 3 mL by nebulization every 2 hours if needed for wheezing or shortness of breath., Disp: , Rfl:     krill oil 500 mg capsule, Take 1 capsule (500 mg) by mouth once daily., Disp: , Rfl:     lubricating eye drops ophthalmic solution, Administer 1 drop into both eyes 3 times a day as needed for dry eyes., Disp: , Rfl:     melatonin 5 mg capsule, Take 1 capsule (5 mg) by mouth as needed at bedtime., Disp: , Rfl:     multivit-minerals/folic acid (CENTRUM ADULTS ORAL), Take 1 tablet by mouth once daily., Disp: , Rfl:     NIFEdipine CC 90 mg 24 hr tablet, Take 1 tablet (90 mg) by mouth once daily., Disp: 90 tablet, Rfl: 2    oxybutynin (Ditropan) 5 mg tablet, Take 1 tablet (5 mg) by mouth 3 times a day., Disp: , Rfl:     polyethylene glycol (Glycolax, Miralax) 17 gram packet, Take 17 g by mouth once daily as needed (constipation)., Disp: , Rfl:

## 2025-06-18 NOTE — PATIENT INSTRUCTIONS
-Start Sinemet  mg as follows:  Start with half a tablet twice a day for one week.   Then increase to half a tablet three times a day for one week.   Then increase to 0.5 tablet in the morning, 0.5 tablet at lunch and a full tablet at bedtime for one week.   Then increase to 0.5 tablet in the morning, a full tablet at lunch, and a full tablet at bedtime for one week.   Then increase to a full tablet three times daily.  -Recommend to wear hearing aids   -Referral for PT   -Donepezil has been discontinued due to bradycardia  -Follow up in 3-4 months or sooner if needed

## 2025-08-01 DIAGNOSIS — E78.49 OTHER HYPERLIPIDEMIA: ICD-10-CM

## 2025-08-01 NOTE — TELEPHONE ENCOUNTER
Rx Refill Request Telephone Encounter    Name:  Marlo Siddhartha  :  [id number]  Specific Pharmacy location:  [facility]

## 2025-08-02 RX ORDER — ATORVASTATIN CALCIUM 20 MG/1
20 TABLET, FILM COATED ORAL DAILY
Qty: 90 TABLET | Refills: 0 | Status: SHIPPED | OUTPATIENT
Start: 2025-08-02 | End: 2025-10-31

## 2025-08-03 ENCOUNTER — APPOINTMENT (OUTPATIENT)
Dept: CARDIOLOGY | Facility: HOSPITAL | Age: 85
End: 2025-08-03
Payer: MEDICARE

## 2025-08-03 ENCOUNTER — HOSPITAL ENCOUNTER (INPATIENT)
Facility: HOSPITAL | Age: 85
LOS: 5 days | Discharge: NURSING, REHAB OR LONG-TERM CARE | End: 2025-08-08
Attending: EMERGENCY MEDICINE | Admitting: INTERNAL MEDICINE
Payer: MEDICARE

## 2025-08-03 ENCOUNTER — APPOINTMENT (OUTPATIENT)
Dept: RADIOLOGY | Facility: HOSPITAL | Age: 85
End: 2025-08-03
Payer: MEDICARE

## 2025-08-03 DIAGNOSIS — R56.9 FIRST TIME SEIZURE (MULTI): Primary | ICD-10-CM

## 2025-08-03 DIAGNOSIS — I63.9 CEREBRAL INFARCTION, UNSPECIFIED MECHANISM (MULTI): ICD-10-CM

## 2025-08-03 DIAGNOSIS — R79.89 ELEVATED TROPONIN: ICD-10-CM

## 2025-08-03 LAB
ALBUMIN SERPL BCP-MCNC: 3.4 G/DL (ref 3.4–5)
ALP SERPL-CCNC: 72 U/L (ref 33–136)
ALT SERPL W P-5'-P-CCNC: 8 U/L (ref 10–52)
ANION GAP SERPL CALC-SCNC: 9 MMOL/L (ref 10–20)
APTT PPP: 24 SECONDS (ref 26–36)
AST SERPL W P-5'-P-CCNC: 12 U/L (ref 9–39)
BASOPHILS # BLD AUTO: 0.02 X10*3/UL (ref 0–0.1)
BASOPHILS NFR BLD AUTO: 0.5 %
BILIRUB SERPL-MCNC: 0.3 MG/DL (ref 0–1.2)
BUN SERPL-MCNC: 28 MG/DL (ref 6–23)
CALCIUM SERPL-MCNC: 8 MG/DL (ref 8.6–10.3)
CARDIAC TROPONIN I PNL SERPL HS: 115 NG/L (ref 0–20)
CARDIAC TROPONIN I PNL SERPL HS: 88 NG/L (ref 0–20)
CHLORIDE SERPL-SCNC: 102 MMOL/L (ref 98–107)
CO2 SERPL-SCNC: 26 MMOL/L (ref 21–32)
CREAT SERPL-MCNC: 1.85 MG/DL (ref 0.5–1.3)
EGFRCR SERPLBLD CKD-EPI 2021: 35 ML/MIN/1.73M*2
EOSINOPHIL # BLD AUTO: 0.08 X10*3/UL (ref 0–0.4)
EOSINOPHIL NFR BLD AUTO: 1.9 %
ERYTHROCYTE [DISTWIDTH] IN BLOOD BY AUTOMATED COUNT: 14.4 % (ref 11.5–14.5)
GLUCOSE BLD MANUAL STRIP-MCNC: 88 MG/DL (ref 74–99)
GLUCOSE SERPL-MCNC: 85 MG/DL (ref 74–99)
HCT VFR BLD AUTO: 35.3 % (ref 41–52)
HGB BLD-MCNC: 11.3 G/DL (ref 13.5–17.5)
IMM GRANULOCYTES # BLD AUTO: 0.01 X10*3/UL (ref 0–0.5)
IMM GRANULOCYTES NFR BLD AUTO: 0.2 % (ref 0–0.9)
INR PPP: 1 (ref 0.9–1.1)
LYMPHOCYTES # BLD AUTO: 0.59 X10*3/UL (ref 0.8–3)
LYMPHOCYTES NFR BLD AUTO: 14.2 %
MCH RBC QN AUTO: 27.7 PG (ref 26–34)
MCHC RBC AUTO-ENTMCNC: 32 G/DL (ref 32–36)
MCV RBC AUTO: 87 FL (ref 80–100)
MONOCYTES # BLD AUTO: 0.32 X10*3/UL (ref 0.05–0.8)
MONOCYTES NFR BLD AUTO: 7.7 %
NEUTROPHILS # BLD AUTO: 3.13 X10*3/UL (ref 1.6–5.5)
NEUTROPHILS NFR BLD AUTO: 75.5 %
NRBC BLD-RTO: 0 /100 WBCS (ref 0–0)
PLATELET # BLD AUTO: 201 X10*3/UL (ref 150–450)
POTASSIUM SERPL-SCNC: 4.3 MMOL/L (ref 3.5–5.3)
PROT SERPL-MCNC: 6.7 G/DL (ref 6.4–8.2)
PROTHROMBIN TIME: 11.2 SECONDS (ref 9.8–12.4)
RBC # BLD AUTO: 4.08 X10*6/UL (ref 4.5–5.9)
SODIUM SERPL-SCNC: 133 MMOL/L (ref 136–145)
WBC # BLD AUTO: 4.2 X10*3/UL (ref 4.4–11.3)

## 2025-08-03 PROCEDURE — 70496 CT ANGIOGRAPHY HEAD: CPT | Performed by: RADIOLOGY

## 2025-08-03 PROCEDURE — 2500000001 HC RX 250 WO HCPCS SELF ADMINISTERED DRUGS (ALT 637 FOR MEDICARE OP): Performed by: EMERGENCY MEDICINE

## 2025-08-03 PROCEDURE — 70450 CT HEAD/BRAIN W/O DYE: CPT | Performed by: RADIOLOGY

## 2025-08-03 PROCEDURE — 93005 ELECTROCARDIOGRAM TRACING: CPT

## 2025-08-03 PROCEDURE — 70498 CT ANGIOGRAPHY NECK: CPT

## 2025-08-03 PROCEDURE — 96365 THER/PROPH/DIAG IV INF INIT: CPT

## 2025-08-03 PROCEDURE — 36415 COLL VENOUS BLD VENIPUNCTURE: CPT | Performed by: EMERGENCY MEDICINE

## 2025-08-03 PROCEDURE — 1200000002 HC GENERAL ROOM WITH TELEMETRY DAILY

## 2025-08-03 PROCEDURE — 70498 CT ANGIOGRAPHY NECK: CPT | Performed by: RADIOLOGY

## 2025-08-03 PROCEDURE — 80053 COMPREHEN METABOLIC PANEL: CPT | Performed by: EMERGENCY MEDICINE

## 2025-08-03 PROCEDURE — 85730 THROMBOPLASTIN TIME PARTIAL: CPT | Performed by: EMERGENCY MEDICINE

## 2025-08-03 PROCEDURE — 99291 CRITICAL CARE FIRST HOUR: CPT | Performed by: EMERGENCY MEDICINE

## 2025-08-03 PROCEDURE — 85610 PROTHROMBIN TIME: CPT | Performed by: EMERGENCY MEDICINE

## 2025-08-03 PROCEDURE — 82947 ASSAY GLUCOSE BLOOD QUANT: CPT

## 2025-08-03 PROCEDURE — 2550000001 HC RX 255 CONTRASTS: Performed by: EMERGENCY MEDICINE

## 2025-08-03 PROCEDURE — 2500000004 HC RX 250 GENERAL PHARMACY W/ HCPCS (ALT 636 FOR OP/ED): Performed by: EMERGENCY MEDICINE

## 2025-08-03 PROCEDURE — 84484 ASSAY OF TROPONIN QUANT: CPT | Performed by: EMERGENCY MEDICINE

## 2025-08-03 PROCEDURE — 85025 COMPLETE CBC W/AUTO DIFF WBC: CPT | Performed by: EMERGENCY MEDICINE

## 2025-08-03 PROCEDURE — 70450 CT HEAD/BRAIN W/O DYE: CPT

## 2025-08-03 RX ORDER — NIFEDIPINE 30 MG/1
90 TABLET, FILM COATED, EXTENDED RELEASE ORAL
Status: DISCONTINUED | OUTPATIENT
Start: 2025-08-04 | End: 2025-08-08 | Stop reason: HOSPADM

## 2025-08-03 RX ORDER — NAPROXEN SODIUM 220 MG/1
81 TABLET, FILM COATED ORAL DAILY
Status: DISCONTINUED | OUTPATIENT
Start: 2025-08-04 | End: 2025-08-08 | Stop reason: HOSPADM

## 2025-08-03 RX ORDER — ACETAMINOPHEN 325 MG/1
650 TABLET ORAL EVERY 6 HOURS PRN
Status: DISCONTINUED | OUTPATIENT
Start: 2025-08-03 | End: 2025-08-04

## 2025-08-03 RX ORDER — ATORVASTATIN CALCIUM 40 MG/1
40 TABLET, FILM COATED ORAL NIGHTLY
Status: DISCONTINUED | OUTPATIENT
Start: 2025-08-03 | End: 2025-08-08 | Stop reason: HOSPADM

## 2025-08-03 RX ORDER — NAPROXEN SODIUM 220 MG/1
324 TABLET, FILM COATED ORAL ONCE
Status: COMPLETED | OUTPATIENT
Start: 2025-08-03 | End: 2025-08-03

## 2025-08-03 RX ORDER — ONDANSETRON HYDROCHLORIDE 2 MG/ML
4 INJECTION, SOLUTION INTRAVENOUS EVERY 6 HOURS PRN
Status: DISCONTINUED | OUTPATIENT
Start: 2025-08-03 | End: 2025-08-04

## 2025-08-03 RX ORDER — PANTOPRAZOLE SODIUM 40 MG/1
40 TABLET, DELAYED RELEASE ORAL
Status: DISCONTINUED | OUTPATIENT
Start: 2025-08-04 | End: 2025-08-04

## 2025-08-03 RX ORDER — LEVETIRACETAM 15 MG/ML
1500 INJECTION INTRAVASCULAR ONCE
Status: COMPLETED | OUTPATIENT
Start: 2025-08-03 | End: 2025-08-03

## 2025-08-03 RX ORDER — ALUMINUM HYDROXIDE, MAGNESIUM HYDROXIDE, AND SIMETHICONE 1200; 120; 1200 MG/30ML; MG/30ML; MG/30ML
20 SUSPENSION ORAL 4 TIMES DAILY PRN
Status: DISCONTINUED | OUTPATIENT
Start: 2025-08-03 | End: 2025-08-08 | Stop reason: HOSPADM

## 2025-08-03 RX ORDER — TALC
3 POWDER (GRAM) TOPICAL NIGHTLY PRN
Status: DISCONTINUED | OUTPATIENT
Start: 2025-08-03 | End: 2025-08-08 | Stop reason: HOSPADM

## 2025-08-03 RX ADMIN — ASPIRIN 324 MG: 81 TABLET, CHEWABLE ORAL at 22:13

## 2025-08-03 RX ADMIN — IOHEXOL 90 ML: 350 INJECTION, SOLUTION INTRAVENOUS at 18:12

## 2025-08-03 RX ADMIN — LEVETIRACETAM 1500 MG: 15 INJECTION INTRAVENOUS at 18:58

## 2025-08-03 ASSESSMENT — PAIN - FUNCTIONAL ASSESSMENT: PAIN_FUNCTIONAL_ASSESSMENT: 0-10

## 2025-08-03 ASSESSMENT — PAIN SCALES - GENERAL
PAINLEVEL_OUTOF10: 0 - NO PAIN
PAINLEVEL_OUTOF10: 0 - NO PAIN

## 2025-08-03 NOTE — ED TRIAGE NOTES
Pt to ED from home for seizure activity and left sided facial droop, LNW 1700. Pt's wife stated pt was sitting in a recliner and began shaking all over and became unresponsive for approximately 2-3 minutes, states it took patient a few minutes to respond. Pt had a prior stroke in 1996, with left-sided deficits. Pt does not take a thinner, no history of seizure.

## 2025-08-03 NOTE — ED PROVIDER NOTES
Emergency Department Provider Note       History of Present Illness   History provided by: Family Member  Limitations to History: Altered Mental Status  External Records Reviewed with Brief Summary: See below    HPI:  Marlo Carl is a 85 y.o. male who presents for evaluation of facial droop and seizure-like activity.  History from patient's wife at bedside who reported that last known well was approximately 1700.  Sitting in recliner and she noticed that her arms and legs became stiff followed by shaking for several minutes.  Did have urinary incontinence.  Afterwards patient was not responsive and then gradually return to consciousness and is currently able to speak though family feels that he is still slightly confused compared to baseline.  Patient's wife reports that he typically does not know the year unless he is prompted.  And she is concerned that he has new left-sided facial droop.  Patient's wife reports that previously he had a stroke in 1996 with residual left-sided deficits.    I reviewed discharge summary from 2/12/2025.  History of hypertension, hyperlipidemia, CKD, pulmonary embolism, CKD.     Last Known Well Time: 1700    Physical Exam   Triage vitals:  T 36.2 °C (97.2 °F)  HR 76  /85  RR 16  O2 95 % None (Room air)    General: No acute distress.  Eyes: Gaze conjugate.   HENT: Atraumatic. No stridor.  CV: Normal rate, regular rhythm. Radial pulses 2+ bilaterally.  Resp: Breathing non-labored, speaking in full sentences.   GI: Soft, non-distended, non-tender. No rebound or guarding.  MSK/Extremities: No gross bony deformities. Moving all extremities.  Skin: Warm. Appropriate color.  Neuro: See below for NIHSS.  Psych: Appropriate mood and affect.    NIH Stroke Scale   NIHSS:   NIH Stroke Scale:     Date/Time of Assessment: 8/3/2025 5:55 PM    1A. Level of Consciousness:  Alert (keenly responsive) (0)    1B. Ask Month and Age:  1 question right (+1)    1C. Blink Eyes & Squeeze Hands:   "Performs both tasks (0)    2. Best Gaze:  Normal (0)    3. Visual:  No visual loss (0)    4. Facial Palsy:  Normal symmetry (0)    5A. Motor - Left Arm:  Drift (+1)    5B. Motor - Right Arm:  No drift (0)    6A. Motor - Left Leg:  No drift (0)    6B. Motor - Right Leg:  No drift (0)    7. Limb Ataxia:  No ataxia (0)    8. Sensory Loss:  Normal (no sensory loss) (0)    9. Best Language:  Mild-moderate aphasia (+1)    10. Dysarthia:  Normal (0)    11. Extinction and Inattention:  No abnormality (0)    NIH Stroke Scale:  3      VAN: Positive    Medical Decision Making & ED Course   I evaluated patient after stroke alert was called in triage.  After discussing with patient's wife, presentation was assessed to be most consistent with first time generalized seizure rather than acute ischemic stroke.  Patient was slightly slow to respond to questions with non-fluent speech which was different than baseline and was unable to correctly identify the month/year however patient's wife noted that he would not typically be able to state the date at baseline.  Contrary to triage chief compliant, face was symmetric.  Left arm did pronate with slight drift though no left leg drift and patient's wife thought left leg strength was actually better than usual.  CT imaging demonstrated remote white matter changes but no acute intracranial hemorrhage.  Loaded with Keppra IV for first time seizure.  Troponin obtained as part of stroke order set was elevated and delta further increased though patient reported no chest pain or shortness of breath and ECG did not suggest acute ischemic changes.  Do not suspect acute coronary syndrome.  Aspirin 324 administered.  I discussed results with patient and his family at bedside and his wife reported that he was \"70% back to baseline\".  Given advanced age and first time seizure and uptrending troponin recommended admission and they were agreeable.  Discussed with admitting hospitalist.        IV " Thrombolysis Given: No; Thrombolysis contraindication reason: Working diagnosis is NOT a suspected ischemic stroke    ED Course:  ED Course as of 08/04/25 0209   Sun Aug 03, 2025   1855 ECG 12 lead  ECG interpreted by me.  Performed 8/3/2025 at 1827.  Sinus rhythm first-degree AV block 73 bpm.  , QRS 76, QTc 456.  Appears similar to previous ECG performed February 5, 2025. [MC]      ED Course User Index  [MC] Abdirahman Busby MD         Diagnoses as of 08/04/25 0209   First time seizure (Multi)   Elevated troponin       Disposition   As a result of their workup, the patient will require admission to the hospital.  The patient was informed of his diagnosis.  The patient was given the opportunity to ask questions and I answered them. The patient agreed to be admitted to the hospital.    Procedures   Procedures    Patient was seen independently    Abdirahman Busby MD  Emergency Medicine                                                       Abdirahman Busby MD  08/04/25 1687

## 2025-08-04 LAB
ANION GAP SERPL CALC-SCNC: 15 MMOL/L (ref 10–20)
APPEARANCE UR: ABNORMAL
ATRIAL RATE: 73 BPM
BILIRUB UR STRIP.AUTO-MCNC: NEGATIVE MG/DL
BUN SERPL-MCNC: 23 MG/DL (ref 6–23)
CALCIUM SERPL-MCNC: 8.8 MG/DL (ref 8.6–10.3)
CARDIAC TROPONIN I PNL SERPL HS: 115 NG/L (ref 0–20)
CARDIAC TROPONIN I PNL SERPL HS: 126 NG/L (ref 0–20)
CHLORIDE SERPL-SCNC: 102 MMOL/L (ref 98–107)
CO2 SERPL-SCNC: 24 MMOL/L (ref 21–32)
COLOR UR: YELLOW
CREAT SERPL-MCNC: 1.71 MG/DL (ref 0.5–1.3)
EGFRCR SERPLBLD CKD-EPI 2021: 39 ML/MIN/1.73M*2
ERYTHROCYTE [DISTWIDTH] IN BLOOD BY AUTOMATED COUNT: 14.5 % (ref 11.5–14.5)
GLUCOSE SERPL-MCNC: 109 MG/DL (ref 74–99)
GLUCOSE UR STRIP.AUTO-MCNC: NEGATIVE MG/DL
HCT VFR BLD AUTO: 39.4 % (ref 41–52)
HGB BLD-MCNC: 12.5 G/DL (ref 13.5–17.5)
KETONES UR STRIP.AUTO-MCNC: NEGATIVE MG/DL
LEUKOCYTE ESTERASE UR QL STRIP.AUTO: ABNORMAL
MCH RBC QN AUTO: 27.4 PG (ref 26–34)
MCHC RBC AUTO-ENTMCNC: 31.7 G/DL (ref 32–36)
MCV RBC AUTO: 86 FL (ref 80–100)
MUCOUS THREADS #/AREA URNS AUTO: ABNORMAL /LPF
NITRITE UR QL STRIP.AUTO: NEGATIVE
NRBC BLD-RTO: 0 /100 WBCS (ref 0–0)
P AXIS: 61 DEGREES
P OFFSET: 154 MS
P ONSET: 100 MS
PH UR STRIP.AUTO: 6.5 [PH]
PLATELET # BLD AUTO: 206 X10*3/UL (ref 150–450)
POTASSIUM SERPL-SCNC: 4.4 MMOL/L (ref 3.5–5.3)
PR INTERVAL: 250 MS
PROT UR STRIP.AUTO-MCNC: ABNORMAL MG/DL
Q ONSET: 225 MS
QRS COUNT: 12 BEATS
QRS DURATION: 76 MS
QT INTERVAL: 414 MS
QTC CALCULATION(BAZETT): 456 MS
QTC FREDERICIA: 442 MS
R AXIS: 17 DEGREES
RBC # BLD AUTO: 4.56 X10*6/UL (ref 4.5–5.9)
RBC # UR STRIP.AUTO: ABNORMAL MG/DL
RBC #/AREA URNS AUTO: ABNORMAL /HPF
SODIUM SERPL-SCNC: 137 MMOL/L (ref 136–145)
SP GR UR STRIP.AUTO: 1.01
SQUAMOUS #/AREA URNS AUTO: ABNORMAL /HPF
T AXIS: -31 DEGREES
T OFFSET: 432 MS
UROBILINOGEN UR STRIP.AUTO-MCNC: ABNORMAL MG/DL
VENTRICULAR RATE: 73 BPM
WBC # BLD AUTO: 4.7 X10*3/UL (ref 4.4–11.3)
WBC #/AREA URNS AUTO: ABNORMAL /HPF

## 2025-08-04 PROCEDURE — 80048 BASIC METABOLIC PNL TOTAL CA: CPT

## 2025-08-04 PROCEDURE — 2500000001 HC RX 250 WO HCPCS SELF ADMINISTERED DRUGS (ALT 637 FOR MEDICARE OP): Performed by: INTERNAL MEDICINE

## 2025-08-04 PROCEDURE — 99222 1ST HOSP IP/OBS MODERATE 55: CPT | Performed by: INTERNAL MEDICINE

## 2025-08-04 PROCEDURE — 2500000004 HC RX 250 GENERAL PHARMACY W/ HCPCS (ALT 636 FOR OP/ED): Performed by: INTERNAL MEDICINE

## 2025-08-04 PROCEDURE — 2500000005 HC RX 250 GENERAL PHARMACY W/O HCPCS: Performed by: INTERNAL MEDICINE

## 2025-08-04 PROCEDURE — 36415 COLL VENOUS BLD VENIPUNCTURE: CPT

## 2025-08-04 PROCEDURE — 84484 ASSAY OF TROPONIN QUANT: CPT

## 2025-08-04 PROCEDURE — 2500000002 HC RX 250 W HCPCS SELF ADMINISTERED DRUGS (ALT 637 FOR MEDICARE OP, ALT 636 FOR OP/ED): Performed by: INTERNAL MEDICINE

## 2025-08-04 PROCEDURE — 99291 CRITICAL CARE FIRST HOUR: CPT | Mod: 25 | Performed by: EMERGENCY MEDICINE

## 2025-08-04 PROCEDURE — 1200000002 HC GENERAL ROOM WITH TELEMETRY DAILY

## 2025-08-04 PROCEDURE — 85027 COMPLETE CBC AUTOMATED: CPT

## 2025-08-04 PROCEDURE — 2500000001 HC RX 250 WO HCPCS SELF ADMINISTERED DRUGS (ALT 637 FOR MEDICARE OP)

## 2025-08-04 PROCEDURE — 81001 URINALYSIS AUTO W/SCOPE: CPT | Performed by: INTERNAL MEDICINE

## 2025-08-04 RX ORDER — POLYETHYLENE GLYCOL 3350 17 G/17G
17 POWDER, FOR SOLUTION ORAL DAILY PRN
Status: DISCONTINUED | OUTPATIENT
Start: 2025-08-04 | End: 2025-08-04

## 2025-08-04 RX ORDER — ENOXAPARIN SODIUM 100 MG/ML
30 INJECTION SUBCUTANEOUS EVERY 24 HOURS
Status: DISCONTINUED | OUTPATIENT
Start: 2025-08-04 | End: 2025-08-08 | Stop reason: HOSPADM

## 2025-08-04 RX ORDER — LEVETIRACETAM 500 MG/1
500 TABLET ORAL 2 TIMES DAILY
Status: DISCONTINUED | OUTPATIENT
Start: 2025-08-04 | End: 2025-08-08 | Stop reason: HOSPADM

## 2025-08-04 RX ORDER — ACETAMINOPHEN 325 MG/1
650 TABLET ORAL EVERY 4 HOURS PRN
Status: DISCONTINUED | OUTPATIENT
Start: 2025-08-04 | End: 2025-08-08 | Stop reason: HOSPADM

## 2025-08-04 RX ORDER — POLYETHYLENE GLYCOL 3350 17 G/17G
17 POWDER, FOR SOLUTION ORAL DAILY PRN
Status: DISCONTINUED | OUTPATIENT
Start: 2025-08-04 | End: 2025-08-08 | Stop reason: HOSPADM

## 2025-08-04 RX ORDER — PANTOPRAZOLE SODIUM 40 MG/10ML
40 INJECTION, POWDER, LYOPHILIZED, FOR SOLUTION INTRAVENOUS
Status: DISCONTINUED | OUTPATIENT
Start: 2025-08-05 | End: 2025-08-08 | Stop reason: HOSPADM

## 2025-08-04 RX ORDER — ACETAMINOPHEN 160 MG/5ML
650 SOLUTION ORAL EVERY 4 HOURS PRN
Status: DISCONTINUED | OUTPATIENT
Start: 2025-08-04 | End: 2025-08-08 | Stop reason: HOSPADM

## 2025-08-04 RX ORDER — GUAIFENESIN 600 MG/1
600 TABLET, EXTENDED RELEASE ORAL EVERY 12 HOURS PRN
Status: DISCONTINUED | OUTPATIENT
Start: 2025-08-04 | End: 2025-08-08 | Stop reason: HOSPADM

## 2025-08-04 RX ORDER — PANTOPRAZOLE SODIUM 40 MG/1
40 TABLET, DELAYED RELEASE ORAL
Status: DISCONTINUED | OUTPATIENT
Start: 2025-08-05 | End: 2025-08-08 | Stop reason: HOSPADM

## 2025-08-04 RX ORDER — ACETAMINOPHEN 650 MG/1
650 SUPPOSITORY RECTAL EVERY 4 HOURS PRN
Status: DISCONTINUED | OUTPATIENT
Start: 2025-08-04 | End: 2025-08-08 | Stop reason: HOSPADM

## 2025-08-04 RX ORDER — CARBIDOPA AND LEVODOPA 25; 100 MG/1; MG/1
1 TABLET ORAL 3 TIMES DAILY
Status: DISCONTINUED | OUTPATIENT
Start: 2025-08-04 | End: 2025-08-08 | Stop reason: HOSPADM

## 2025-08-04 RX ORDER — ONDANSETRON 4 MG/1
4 TABLET, FILM COATED ORAL EVERY 8 HOURS PRN
Status: DISCONTINUED | OUTPATIENT
Start: 2025-08-04 | End: 2025-08-08 | Stop reason: HOSPADM

## 2025-08-04 RX ORDER — ONDANSETRON HYDROCHLORIDE 2 MG/ML
4 INJECTION, SOLUTION INTRAVENOUS EVERY 8 HOURS PRN
Status: DISCONTINUED | OUTPATIENT
Start: 2025-08-04 | End: 2025-08-08 | Stop reason: HOSPADM

## 2025-08-04 RX ADMIN — CARBIDOPA AND LEVODOPA 1 TABLET: 25; 100 TABLET ORAL at 21:12

## 2025-08-04 RX ADMIN — ATORVASTATIN CALCIUM 40 MG: 40 TABLET, FILM COATED ORAL at 21:12

## 2025-08-04 RX ADMIN — CARBIDOPA AND LEVODOPA 1 TABLET: 25; 100 TABLET ORAL at 10:29

## 2025-08-04 RX ADMIN — ENOXAPARIN SODIUM 30 MG: 30 INJECTION SUBCUTANEOUS at 10:29

## 2025-08-04 RX ADMIN — Medication 3 MG: at 21:13

## 2025-08-04 RX ADMIN — LEVETIRACETAM 500 MG: 500 TABLET, FILM COATED ORAL at 10:33

## 2025-08-04 RX ADMIN — ATORVASTATIN CALCIUM 40 MG: 40 TABLET, FILM COATED ORAL at 00:40

## 2025-08-04 RX ADMIN — PANTOPRAZOLE SODIUM 40 MG: 40 TABLET, DELAYED RELEASE ORAL at 06:28

## 2025-08-04 RX ADMIN — CARBIDOPA AND LEVODOPA 1 TABLET: 25; 100 TABLET ORAL at 14:24

## 2025-08-04 RX ADMIN — ASPIRIN 81 MG CHEWABLE TABLET 81 MG: 81 TABLET CHEWABLE at 08:35

## 2025-08-04 RX ADMIN — NIFEDIPINE 90 MG: 30 TABLET, FILM COATED, EXTENDED RELEASE ORAL at 06:28

## 2025-08-04 RX ADMIN — LEVETIRACETAM 500 MG: 500 TABLET, FILM COATED ORAL at 21:12

## 2025-08-04 SDOH — ECONOMIC STABILITY: INCOME INSECURITY: IN THE PAST 12 MONTHS HAS THE ELECTRIC, GAS, OIL, OR WATER COMPANY THREATENED TO SHUT OFF SERVICES IN YOUR HOME?: NO

## 2025-08-04 SDOH — SOCIAL STABILITY: SOCIAL INSECURITY: WITHIN THE LAST YEAR, HAVE YOU BEEN AFRAID OF YOUR PARTNER OR EX-PARTNER?: NO

## 2025-08-04 SDOH — ECONOMIC STABILITY: FOOD INSECURITY: WITHIN THE PAST 12 MONTHS, YOU WORRIED THAT YOUR FOOD WOULD RUN OUT BEFORE YOU GOT THE MONEY TO BUY MORE.: NEVER TRUE

## 2025-08-04 SDOH — ECONOMIC STABILITY: FOOD INSECURITY: WITHIN THE PAST 12 MONTHS, THE FOOD YOU BOUGHT JUST DIDN'T LAST AND YOU DIDN'T HAVE MONEY TO GET MORE.: NEVER TRUE

## 2025-08-04 SDOH — SOCIAL STABILITY: SOCIAL INSECURITY: HAS ANYONE EVER THREATENED TO HURT YOUR FAMILY OR YOUR PETS?: NO

## 2025-08-04 SDOH — SOCIAL STABILITY: SOCIAL INSECURITY: HAVE YOU HAD ANY THOUGHTS OF HARMING ANYONE ELSE?: NO

## 2025-08-04 SDOH — SOCIAL STABILITY: SOCIAL INSECURITY: DOES ANYONE TRY TO KEEP YOU FROM HAVING/CONTACTING OTHER FRIENDS OR DOING THINGS OUTSIDE YOUR HOME?: NO

## 2025-08-04 SDOH — SOCIAL STABILITY: SOCIAL INSECURITY: WERE YOU ABLE TO COMPLETE ALL THE BEHAVIORAL HEALTH SCREENINGS?: YES

## 2025-08-04 SDOH — SOCIAL STABILITY: SOCIAL INSECURITY: WITHIN THE LAST YEAR, HAVE YOU BEEN HUMILIATED OR EMOTIONALLY ABUSED IN OTHER WAYS BY YOUR PARTNER OR EX-PARTNER?: NO

## 2025-08-04 SDOH — SOCIAL STABILITY: SOCIAL INSECURITY: ARE THERE ANY APPARENT SIGNS OF INJURIES/BEHAVIORS THAT COULD BE RELATED TO ABUSE/NEGLECT?: NO

## 2025-08-04 SDOH — SOCIAL STABILITY: SOCIAL INSECURITY: ARE YOU OR HAVE YOU BEEN THREATENED OR ABUSED PHYSICALLY, EMOTIONALLY, OR SEXUALLY BY ANYONE?: NO

## 2025-08-04 SDOH — SOCIAL STABILITY: SOCIAL INSECURITY: DO YOU FEEL ANYONE HAS EXPLOITED OR TAKEN ADVANTAGE OF YOU FINANCIALLY OR OF YOUR PERSONAL PROPERTY?: NO

## 2025-08-04 SDOH — SOCIAL STABILITY: SOCIAL INSECURITY: ABUSE: ADULT

## 2025-08-04 SDOH — SOCIAL STABILITY: SOCIAL INSECURITY: DO YOU FEEL UNSAFE GOING BACK TO THE PLACE WHERE YOU ARE LIVING?: NO

## 2025-08-04 SDOH — SOCIAL STABILITY: SOCIAL INSECURITY: HAVE YOU HAD THOUGHTS OF HARMING ANYONE ELSE?: NO

## 2025-08-04 ASSESSMENT — ACTIVITIES OF DAILY LIVING (ADL)
HEARING - RIGHT EAR: DIFFICULTY WITH NOISE
ASSISTIVE_DEVICE: WALKER
TOILETING: DEPENDENT
GROOMING: INDEPENDENT
JUDGMENT_ADEQUATE_SAFELY_COMPLETE_DAILY_ACTIVITIES: YES
WALKS IN HOME: NEEDS ASSISTANCE
LACK_OF_TRANSPORTATION: NO
LACK_OF_TRANSPORTATION: NO
PATIENT'S MEMORY ADEQUATE TO SAFELY COMPLETE DAILY ACTIVITIES?: YES
DRESSING YOURSELF: NEEDS ASSISTANCE
HEARING - LEFT EAR: DIFFICULTY WITH NOISE
BATHING: NEEDS ASSISTANCE
ADEQUATE_TO_COMPLETE_ADL: YES
FEEDING YOURSELF: INDEPENDENT
LACK_OF_TRANSPORTATION: NO

## 2025-08-04 ASSESSMENT — PATIENT HEALTH QUESTIONNAIRE - PHQ9
SUM OF ALL RESPONSES TO PHQ9 QUESTIONS 1 & 2: 0
2. FEELING DOWN, DEPRESSED OR HOPELESS: NOT AT ALL
1. LITTLE INTEREST OR PLEASURE IN DOING THINGS: NOT AT ALL

## 2025-08-04 ASSESSMENT — LIFESTYLE VARIABLES
SKIP TO QUESTIONS 9-10: 1
HOW OFTEN DO YOU HAVE 6 OR MORE DRINKS ON ONE OCCASION: NEVER
AUDIT-C TOTAL SCORE: 1
HOW OFTEN DO YOU HAVE A DRINK CONTAINING ALCOHOL: MONTHLY OR LESS
AUDIT-C TOTAL SCORE: 1
HOW MANY STANDARD DRINKS CONTAINING ALCOHOL DO YOU HAVE ON A TYPICAL DAY: 1 OR 2

## 2025-08-04 ASSESSMENT — COGNITIVE AND FUNCTIONAL STATUS - GENERAL
MOVING TO AND FROM BED TO CHAIR: A LOT
MOVING FROM LYING ON BACK TO SITTING ON SIDE OF FLAT BED WITH BEDRAILS: A LITTLE
DRESSING REGULAR UPPER BODY CLOTHING: A LOT
HELP NEEDED FOR BATHING: A LOT
PATIENT BASELINE BEDBOUND: NO
MOBILITY SCORE: 13
DAILY ACTIVITIY SCORE: 16
STANDING UP FROM CHAIR USING ARMS: A LOT
CLIMB 3 TO 5 STEPS WITH RAILING: TOTAL
TURNING FROM BACK TO SIDE WHILE IN FLAT BAD: A LITTLE
WALKING IN HOSPITAL ROOM: A LOT
TOILETING: A LOT
DRESSING REGULAR LOWER BODY CLOTHING: A LOT

## 2025-08-04 ASSESSMENT — PAIN SCALES - GENERAL
PAINLEVEL_OUTOF10: 0 - NO PAIN

## 2025-08-04 ASSESSMENT — ENCOUNTER SYMPTOMS: SEIZURES: 1

## 2025-08-04 ASSESSMENT — PAIN - FUNCTIONAL ASSESSMENT: PAIN_FUNCTIONAL_ASSESSMENT: 0-10

## 2025-08-04 NOTE — ED PROCEDURE NOTE
Procedure  Critical Care    Performed by: Abdirahman Busby MD  Authorized by: Abdirahman Busby MD    Critical care provider statement:     Critical care time (minutes):  35    Critical care time was exclusive of:  Separately billable procedures and treating other patients and teaching time    Critical care was necessary to treat or prevent imminent or life-threatening deterioration of the following conditions:  CNS failure or compromise    Critical care was time spent personally by me on the following activities:  Blood draw for specimens, development of treatment plan with patient or surrogate, ordering and performing treatments and interventions, ordering and review of laboratory studies, ordering and review of radiographic studies, pulse oximetry, re-evaluation of patient's condition, review of old charts, examination of patient, obtaining history from patient or surrogate and evaluation of patient's response to treatment    Care discussed with: admitting provider                 Abdirahman Busby MD  08/04/25 7772

## 2025-08-04 NOTE — H&P
Marlo Carl is a 85 y.o. male   Seizures     Facial Droop       Patient with a past medical history of  HTN, HLD, BPH with LUTS, CKD III. Lumbar Stenosis, Pulmonary Embolism, hx of CVA with Vascular Dementia, OAB, Incontinence, Parkinsonism was doing well until yesterday when he was noted to have facial droop and seizure-like activity  History obtained from emergency room notes  Patient was sitting in the recliner when he became stiff and started shaking for several minutes had urinary incontinence and became unresponsive  And then slowly the responsiveness improved  Patient has vascular dementia and he does not remember this event  Was brought to the emergency room for initial CAT scan does not show any new strokes and he was loaded with Keppra    Past Medical History  Medical History[1]    Surgical History  Surgical History[2]     Social History  He reports that he quit smoking about 32 years ago. His smoking use included cigarettes. He has never used smokeless tobacco. He reports current alcohol use. He reports that he does not currently use drugs.    Family History  Family History[3]     Allergies  Patient has no known allergies.    Review of Systems   Neurological:  Positive for seizures.        Constitutional: not feeling poorly, no fever, no recent weight gain and no recent weight loss.   Eyes: no blurred vision and no diplopia.   ENT: no hearing loss, no tinnitus, no earache, no sore throat, no hoarseness and no swollen glands in the neck.   Cardiovascular: no chest pain, no tightness or heavy pressure, no shortness of breath, no palpitations and no lower extremity edema.   Respiratory: no cough, wheezing or shortness of breath at rest or exertion  Gastrointestinal: no change in bowel habits, no diarrhea, no constipation, no bloody stools, no nausea, no vomiting, no abdominal pain, no signs and symptoms of ulcer disease, no ramon colored stools and no intolerance to fatty foods.   Genitourinary: no urinary  frequency, no dysuria, no hematuria, no burning sensation during urination, urinary stream is not smaller and urinary stream does not start and stop.       All other systems have been reviewed and are negative for complaint.     Vitals:    08/04/25 1608   BP: 133/72   Pulse: 67   Resp: 17   Temp: 36.9 °C (98.4 °F)   SpO2: 96%        Scheduled medications  Scheduled Medications[4]  Continuous medications  Continuous Medications[5]  PRN medications  PRN Medications[6]    Results from last 7 days   Lab Units 08/04/25  0947 08/03/25  1822   WBC AUTO x10*3/uL 4.7 4.2*   HEMOGLOBIN g/dL 12.5* 11.3*   HEMATOCRIT % 39.4* 35.3*   PLATELETS AUTO x10*3/uL 206 201     Results from last 7 days   Lab Units 08/04/25  0947 08/03/25  1822   SODIUM mmol/L 137 133*   POTASSIUM mmol/L 4.4 4.3   CHLORIDE mmol/L 102 102   CO2 mmol/L 24 26   BUN mg/dL 23 28*   CREATININE mg/dL 1.71* 1.85*   CALCIUM mg/dL 8.8 8.0*   PROTEIN TOTAL g/dL  --  6.7   BILIRUBIN TOTAL mg/dL  --  0.3   ALK PHOS U/L  --  72   ALT U/L  --  8*   AST U/L  --  12   GLUCOSE mg/dL 109* 85     Results from last 7 days   Lab Units 08/04/25  0947 08/03/25 1955 08/03/25  1822   TROPHS ng/L 126* 115* 88*        CT brain attack angio head and neck W and WO IV contrast   Final Result   CT angiography of the carotid and vertebral circulation is within   normal limits.        MACRO:   none        Signed by: Dinesh Hirsch 8/3/2025 6:34 PM   Dictation workstation:   [id number]      CT brain attack head wo IV contrast   Final Result   Large area of remote infarction and extensive diffuse confluent areas   of subcortical and periventricular white matter changes suggestive of   chronic small vessel ischemic disease. Findings similar to previous   study. No evidence of acute cortical infarct or intracranial   hemorrhage. If there is persistent clinical concern for acute   cortical infarction, MRI with diffusion-weighted images is a better   means for further evaluation as  clinically warranted.        MACRO:   None        Signed by: Benitez Guerrero 8/3/2025 6:15 PM   Dictation workstation:   [id number]      MR brain wo IV contrast    (Results Pending)       Physical Exam      Constitutional   General appearance: Alert and in no acute distress.   Eyes   Inspection of eyes: Sclera and conjunctiva were normal.    Pupil exam: Pupils were equal in size. Extraocular movements were intact.   Pulmonary   Respiratory assessment: No respiratory distress, normal respiratory rhythm and effort.    Auscultation of Lungs: Clear bilateral breath sounds.   Cardiovascular   Auscultation of heart: Apical pulse normal, heart rate and rhythm normal, normal S1 and S2, no murmurs and no pericardial rub.    Exam for edema: No peripheral edema.   Abdomen   Abdominal Exam: No bruits, normal bowel sounds, soft, non-tender, no abdominal mass palpated.    Liver and Spleen exam: No hepato-splenomegaly.   Musculoskeletal     Inspection/palpation of joints, bones and muscles: No joint swelling. Normal movement of all extremities.   Skin   Skin inspection: Normal skin color and pigmentation, normal skin turgor and no visible rash.   Neurologic   Cranial nerves: Nerves 2-12 were intact, no focal neuro defects.   Psychiatric   Alert x 2-3      Assessment/Plan      #New onset seizure  Load with Keppra yesterday  Will start on Keppra twice a day  We do not have any neurology coverage at [facility]  We will schedule with neurology as an outpatient  Continue aspirin and statins    #History of CVA  Getting an MRI of the brain to rule out any new CVAs    #Hypertension  Stable continue nifedipine    #Dyslipidemia  Stable continue Lipitor 40 mg    #Parkinson's  #Vascular dementia  Continue Sinemet       [1]   Past Medical History:  Diagnosis Date    Anemia     Arthritis     BPH with obstruction/lower urinary tract symptoms     Cerebral vascular accident (Multi)     1996, 2016 w/ residual left sided weakness, CT Head 9/8/22   Large area of encephalomalacia of the right MCA territory, unchanged since prior. 3. Advanced chronic microvascular ischemic changes and chronic left basal ganglia lacunar infarct.    CKD (chronic kidney disease)     Cognitive decline     Dementia     on donzepril    Depression     Hearing aid worn     refuses to wear    HL (hearing loss)     Hypertension     OAB (overactive bladder)     PE (pulmonary thromboembolism) (Multi) 09/07/2018    off anticoags    Resting tremor     mild hands    RLS (restless legs syndrome)     Spinal stenosis     Unspecified urinary incontinence    [2]   Past Surgical History:  Procedure Laterality Date    CARPAL TUNNEL RELEASE Bilateral     CATARACT EXTRACTION Bilateral 2006    EXCISION / REPAIR HYDROCELE PEDIATRIC Left 12/2022    TRANSURETHRAL RESECTION OF PROSTATE  2016   [3]   Family History  Problem Relation Name Age of Onset    Diabetes Mother      Heart attack Mother      Cancer Father      Other (silicosis) Father      Heart attack Brother     [4] aspirin, 81 mg, oral, Daily  atorvastatin, 40 mg, oral, Nightly  carbidopa-levodopa, 1 tablet, oral, TID  enoxaparin, 30 mg, subcutaneous, q24h  levETIRAcetam, 500 mg, oral, BID  NIFEdipine ER, 90 mg, oral, Daily before breakfast  [START ON 8/5/2025] pantoprazole, 40 mg, oral, Daily before breakfast   Or  [START ON 8/5/2025] pantoprazole, 40 mg, intravenous, Daily before breakfast    [5]    [6] PRN medications: acetaminophen **OR** acetaminophen **OR** acetaminophen, alum-mag hydroxide-simeth, guaiFENesin, melatonin, ondansetron **OR** ondansetron, polyethylene glycol

## 2025-08-04 NOTE — PROGRESS NOTES
08/04/25 1251   Discharge Planning   Living Arrangements Spouse/significant other;Children   Support Systems Spouse/significant other;Children   Assistance Needed Spoke to daughter Surekha regarding dc planning, daughter states small home so patient is usually assited by family around the home, sometimes uses a cane, but uses a rollator outside of home. has no bathroom on the first floor, has been to skilled in March but does NOT want patient to return to same SNF if pt/ot recs patient for MOD level of care. patient does not drive and daughter Surekha is his primary care giver and provides his transport to appts etc. She feels pateint is very weak and would need SNF at discharge, she is aware pt/ot pending, she is asking for referral to be placed [facility] also SNF list sent to  [email or web address], daughter brooke states no bathroom on main floor.   Type of Residence Private residence   Home or Post Acute Services Post acute facilities (Rehab/SNF/etc)   Type of Post Acute Facility Services Skilled nursing   Expected Discharge Disposition SNF   Financial Resource Strain   How hard is it for you to pay for the very basics like food, housing, medical care, and heating? Not very   Housing Stability   In the last 12 months, was there a time when you were not able to pay the mortgage or rent on time? N   In the past 12 months, how many times have you moved where you were living? 0   At any time in the past 12 months, were you homeless or living in a shelter (including now)? N   Transportation Needs   In the past 12 months, has lack of transportation kept you from medical appointments or from getting medications? no   In the past 12 months, has lack of transportation kept you from meetings, work, or from getting things needed for daily living? No   Patient Choice   Provider Choice list and CMS website (https://medicare.gov/care-compare#search) for post-acute Quality and Resource Measure Data were provided and  reviewed with: Family

## 2025-08-04 NOTE — CARE PLAN
The patient's goals for the shift include  rest and feel better    The clinical goals for the shift include maintain pt safety    Over the shift, the patient did make progress toward the following goals.     Problem: Pain - Adult  Goal: Verbalizes/displays adequate comfort level or baseline comfort level  Outcome: Progressing  Flowsheets (Taken 8/4/2025 1744)  Verbalizes/displays adequate comfort level or baseline comfort level:   Encourage patient to monitor pain and request assistance   Assess pain using appropriate pain scale   Administer analgesics based on type and severity of pain and evaluate response   Implement non-pharmacological measures as appropriate and evaluate response   Consider cultural and social influences on pain and pain management     Problem: Safety - Adult  Goal: Free from fall injury  Outcome: Progressing  Flowsheets (Taken 8/4/2025 1744)  Free from fall injury:   Instruct family/caregiver on patient safety   Based on caregiver fall risk screen, instruct family/caregiver to ask for assistance with transferring infant if caregiver noted to have fall risk factors     Problem: Discharge Planning  Goal: Discharge to home or other facility with appropriate resources  Outcome: Progressing  Flowsheets (Taken 8/4/2025 1744)  Discharge to home or other facility with appropriate resources:   Identify barriers to discharge with patient and caregiver   Arrange for needed discharge resources and transportation as appropriate   Identify discharge learning needs (meds, wound care, etc)   Arrange for interpreters to assist at discharge as needed   Refer to discharge planning if patient needs post-hospital services based on physician order or complex needs related to functional status, cognitive ability or social support system     Problem: Chronic Conditions and Co-morbidities  Goal: Patient's chronic conditions and co-morbidity symptoms are monitored and maintained or improved  Outcome:  Progressing  Flowsheets (Taken 8/4/2025 1744)  Care Plan - Patient's Chronic Conditions and Co-Morbidity Symptoms are Monitored and Maintained or Improved:   Monitor and assess patient's chronic conditions and comorbid symptoms for stability, deterioration, or improvement   Collaborate with multidisciplinary team to address chronic and comorbid conditions and prevent exacerbation or deterioration   Update acute care plan with appropriate goals if chronic or comorbid symptoms are exacerbated and prevent overall improvement and discharge     Problem: Nutrition  Goal: Nutrient intake appropriate for maintaining nutritional needs  Outcome: Progressing     Problem: Skin  Goal: Decreased wound size/increased tissue granulation at next dressing change  Outcome: Progressing  Flowsheets (Taken 8/4/2025 1744)  Decreased wound size/increased tissue granulation at next dressing change:   Promote sleep for wound healing   Protective dressings over bony prominences  Goal: Participates in plan/prevention/treatment measures  Outcome: Progressing  Flowsheets (Taken 8/4/2025 1744)  Participates in plan/prevention/treatment measures:   Discuss with provider PT/OT consult   Elevate heels   Increase activity/out of bed for meals  Goal: Prevent/manage excess moisture  Outcome: Progressing  Flowsheets (Taken 8/4/2025 1744)  Prevent/manage excess moisture:   Cleanse incontinence/protect with barrier cream   Follow provider orders for dressing changes   Moisturize dry skin   Monitor for/manage infection if present  Goal: Prevent/minimize sheer/friction injuries  Outcome: Progressing  Flowsheets (Taken 8/4/2025 1744)  Prevent/minimize sheer/friction injuries:   Turn/reposition every 2 hours/use positioning/transfer devices   Use pull sheet   Increase activity/out of bed for meals   HOB 30 degrees or less  Goal: Promote/optimize nutrition  Outcome: Progressing  Flowsheets (Taken 8/4/2025 1744)  Promote/optimize nutrition:   Assist with  feeding   Consume > 50% meals/supplements   Monitor/record intake including meals   Discuss with provider if NPO > 2 days   Offer water/supplements/favorite foods  Goal: Promote skin healing  Outcome: Progressing  Flowsheets (Taken 8/4/2025 1744)  Promote skin healing:   Assess skin/pad under line(s)/device(s)   Ensure correct size (line/device) and apply per  instructions   Protective dressings over bony prominences   Rotate device position/do not position patient on device   Turn/reposition every 2 hours/use positioning/transfer devices

## 2025-08-05 ENCOUNTER — APPOINTMENT (OUTPATIENT)
Dept: NEUROLOGY | Facility: HOSPITAL | Age: 85
End: 2025-08-05
Payer: MEDICARE

## 2025-08-05 ENCOUNTER — APPOINTMENT (OUTPATIENT)
Dept: RADIOLOGY | Facility: HOSPITAL | Age: 85
End: 2025-08-05
Payer: MEDICARE

## 2025-08-05 LAB
ANION GAP SERPL CALC-SCNC: 10 MMOL/L (ref 10–20)
BUN SERPL-MCNC: 28 MG/DL (ref 6–23)
CALCIUM SERPL-MCNC: 9 MG/DL (ref 8.6–10.3)
CHLORIDE SERPL-SCNC: 103 MMOL/L (ref 98–107)
CO2 SERPL-SCNC: 29 MMOL/L (ref 21–32)
CREAT SERPL-MCNC: 1.97 MG/DL (ref 0.5–1.3)
EGFRCR SERPLBLD CKD-EPI 2021: 32 ML/MIN/1.73M*2
ERYTHROCYTE [DISTWIDTH] IN BLOOD BY AUTOMATED COUNT: 14.5 % (ref 11.5–14.5)
GLUCOSE SERPL-MCNC: 93 MG/DL (ref 74–99)
HCT VFR BLD AUTO: 37.5 % (ref 41–52)
HGB BLD-MCNC: 12.1 G/DL (ref 13.5–17.5)
MCH RBC QN AUTO: 27.3 PG (ref 26–34)
MCHC RBC AUTO-ENTMCNC: 32.3 G/DL (ref 32–36)
MCV RBC AUTO: 85 FL (ref 80–100)
NRBC BLD-RTO: 0 /100 WBCS (ref 0–0)
PLATELET # BLD AUTO: 216 X10*3/UL (ref 150–450)
POTASSIUM SERPL-SCNC: 4.5 MMOL/L (ref 3.5–5.3)
RBC # BLD AUTO: 4.43 X10*6/UL (ref 4.5–5.9)
SODIUM SERPL-SCNC: 137 MMOL/L (ref 136–145)
WBC # BLD AUTO: 5.4 X10*3/UL (ref 4.4–11.3)

## 2025-08-05 PROCEDURE — 2500000002 HC RX 250 W HCPCS SELF ADMINISTERED DRUGS (ALT 637 FOR MEDICARE OP, ALT 636 FOR OP/ED): Performed by: INTERNAL MEDICINE

## 2025-08-05 PROCEDURE — 1200000002 HC GENERAL ROOM WITH TELEMETRY DAILY

## 2025-08-05 PROCEDURE — 97165 OT EVAL LOW COMPLEX 30 MIN: CPT | Mod: GO

## 2025-08-05 PROCEDURE — 70551 MRI BRAIN STEM W/O DYE: CPT | Performed by: RADIOLOGY

## 2025-08-05 PROCEDURE — 70551 MRI BRAIN STEM W/O DYE: CPT

## 2025-08-05 PROCEDURE — 99232 SBSQ HOSP IP/OBS MODERATE 35: CPT | Performed by: INTERNAL MEDICINE

## 2025-08-05 PROCEDURE — 2500000001 HC RX 250 WO HCPCS SELF ADMINISTERED DRUGS (ALT 637 FOR MEDICARE OP): Performed by: INTERNAL MEDICINE

## 2025-08-05 PROCEDURE — 95819 EEG AWAKE AND ASLEEP: CPT | Performed by: STUDENT IN AN ORGANIZED HEALTH CARE EDUCATION/TRAINING PROGRAM

## 2025-08-05 PROCEDURE — 2500000004 HC RX 250 GENERAL PHARMACY W/ HCPCS (ALT 636 FOR OP/ED): Performed by: INTERNAL MEDICINE

## 2025-08-05 PROCEDURE — 85027 COMPLETE CBC AUTOMATED: CPT | Performed by: INTERNAL MEDICINE

## 2025-08-05 PROCEDURE — 95819 EEG AWAKE AND ASLEEP: CPT

## 2025-08-05 PROCEDURE — 36415 COLL VENOUS BLD VENIPUNCTURE: CPT | Performed by: INTERNAL MEDICINE

## 2025-08-05 PROCEDURE — 2500000001 HC RX 250 WO HCPCS SELF ADMINISTERED DRUGS (ALT 637 FOR MEDICARE OP)

## 2025-08-05 PROCEDURE — 82374 ASSAY BLOOD CARBON DIOXIDE: CPT | Performed by: INTERNAL MEDICINE

## 2025-08-05 PROCEDURE — 97161 PT EVAL LOW COMPLEX 20 MIN: CPT | Mod: GP

## 2025-08-05 RX ADMIN — NIFEDIPINE 90 MG: 30 TABLET, FILM COATED, EXTENDED RELEASE ORAL at 07:05

## 2025-08-05 RX ADMIN — POLYETHYLENE GLYCOL 3350 17 G: 17 POWDER, FOR SOLUTION ORAL at 22:28

## 2025-08-05 RX ADMIN — PANTOPRAZOLE SODIUM 40 MG: 40 TABLET, DELAYED RELEASE ORAL at 07:07

## 2025-08-05 RX ADMIN — ASPIRIN 81 MG CHEWABLE TABLET 81 MG: 81 TABLET CHEWABLE at 08:24

## 2025-08-05 RX ADMIN — CARBIDOPA AND LEVODOPA 1 TABLET: 25; 100 TABLET ORAL at 16:10

## 2025-08-05 RX ADMIN — ATORVASTATIN CALCIUM 40 MG: 40 TABLET, FILM COATED ORAL at 22:00

## 2025-08-05 RX ADMIN — LEVETIRACETAM 500 MG: 500 TABLET, FILM COATED ORAL at 08:24

## 2025-08-05 RX ADMIN — CARBIDOPA AND LEVODOPA 1 TABLET: 25; 100 TABLET ORAL at 22:00

## 2025-08-05 RX ADMIN — ENOXAPARIN SODIUM 30 MG: 30 INJECTION SUBCUTANEOUS at 08:33

## 2025-08-05 RX ADMIN — CARBIDOPA AND LEVODOPA 1 TABLET: 25; 100 TABLET ORAL at 08:24

## 2025-08-05 RX ADMIN — LEVETIRACETAM 500 MG: 500 TABLET, FILM COATED ORAL at 22:00

## 2025-08-05 ASSESSMENT — COGNITIVE AND FUNCTIONAL STATUS - GENERAL
HELP NEEDED FOR BATHING: A LOT
MOVING TO AND FROM BED TO CHAIR: A LOT
TURNING FROM BACK TO SIDE WHILE IN FLAT BAD: A LITTLE
DRESSING REGULAR LOWER BODY CLOTHING: A LOT
WALKING IN HOSPITAL ROOM: A LOT
HELP NEEDED FOR BATHING: A LOT
EATING MEALS: A LITTLE
DRESSING REGULAR LOWER BODY CLOTHING: A LOT
PERSONAL GROOMING: A LITTLE
MOBILITY SCORE: 15
DRESSING REGULAR UPPER BODY CLOTHING: A LOT
STANDING UP FROM CHAIR USING ARMS: A LOT
MOVING FROM LYING ON BACK TO SITTING ON SIDE OF FLAT BED WITH BEDRAILS: A LITTLE
MOVING TO AND FROM BED TO CHAIR: A LITTLE
WALKING IN HOSPITAL ROOM: A LOT
HELP NEEDED FOR BATHING: A LOT
TOILETING: A LOT
DAILY ACTIVITIY SCORE: 16
MOVING TO AND FROM BED TO CHAIR: A LOT
TOILETING: A LOT
DRESSING REGULAR UPPER BODY CLOTHING: A LOT
MOVING FROM LYING ON BACK TO SITTING ON SIDE OF FLAT BED WITH BEDRAILS: A LITTLE
MOBILITY SCORE: 14
TURNING FROM BACK TO SIDE WHILE IN FLAT BAD: A LITTLE
DAILY ACTIVITIY SCORE: 16
DRESSING REGULAR LOWER BODY CLOTHING: A LOT
DAILY ACTIVITIY SCORE: 14
DRESSING REGULAR UPPER BODY CLOTHING: A LOT
CLIMB 3 TO 5 STEPS WITH RAILING: TOTAL
TOILETING: A LOT
MOBILITY SCORE: 13
TURNING FROM BACK TO SIDE WHILE IN FLAT BAD: A LITTLE
CLIMB 3 TO 5 STEPS WITH RAILING: TOTAL
STANDING UP FROM CHAIR USING ARMS: A LOT
CLIMB 3 TO 5 STEPS WITH RAILING: A LOT
MOVING FROM LYING ON BACK TO SITTING ON SIDE OF FLAT BED WITH BEDRAILS: A LITTLE
WALKING IN HOSPITAL ROOM: A LOT
STANDING UP FROM CHAIR USING ARMS: A LITTLE

## 2025-08-05 ASSESSMENT — PAIN - FUNCTIONAL ASSESSMENT
PAIN_FUNCTIONAL_ASSESSMENT: 0-10

## 2025-08-05 ASSESSMENT — PAIN SCALES - GENERAL
PAINLEVEL_OUTOF10: 0 - NO PAIN

## 2025-08-05 ASSESSMENT — ACTIVITIES OF DAILY LIVING (ADL)
ADL_ASSISTANCE: NEEDS ASSISTANCE
ADL_ASSISTANCE: NEEDS ASSISTANCE

## 2025-08-05 NOTE — PROGRESS NOTES
Marlo Carl is a 85 y.o. male     Patient feels okay  PT recommends SNF  MRI shows no new strokes  Old lacunar infarct seen  We have ordered an EEG    Review of Systems     Constitutional: no fever, no chills, not feeling poorly, not feeling tired   Cardiovascular: no chest pain   Respiratory: no cough, wheezing or shortness of breath a  Gastrointestinal: no abdominal pain, no constipation, no melena, no nausea, no diarrhea, no vomiting and no blood in stools.   Neurological: no headache,   All other systems have been reviewed and are negative for complaint.       Vitals:    08/05/25 1522   BP: 153/79   Pulse: 69   Resp: 16   Temp: 36.4 °C (97.6 °F)   SpO2: 96%        Scheduled medications  Scheduled Medications[1]  Continuous medications  Continuous Medications[2]  PRN medications  PRN Medications[3]    Lab Review   Results from last 7 days   Lab Units 08/05/25  0554 08/04/25  0947 08/03/25  1822   WBC AUTO x10*3/uL 5.4 4.7 4.2*   HEMOGLOBIN g/dL 12.1* 12.5* 11.3*   HEMATOCRIT % 37.5* 39.4* 35.3*   PLATELETS AUTO x10*3/uL 216 206 201     Results from last 7 days   Lab Units 08/05/25  0554 08/04/25  0947 08/03/25  1822   SODIUM mmol/L 137 137 133*   POTASSIUM mmol/L 4.5 4.4 4.3   CHLORIDE mmol/L 103 102 102   CO2 mmol/L 29 24 26   BUN mg/dL 28* 23 28*   CREATININE mg/dL 1.97* 1.71* 1.85*   CALCIUM mg/dL 9.0 8.8 8.0*   PROTEIN TOTAL g/dL  --   --  6.7   BILIRUBIN TOTAL mg/dL  --   --  0.3   ALK PHOS U/L  --   --  72   ALT U/L  --   --  8*   AST U/L  --   --  12   GLUCOSE mg/dL 93 109* 85     Results from last 7 days   Lab Units 08/04/25  1843 08/04/25  0947 08/03/25  1955   TROPHS ng/L 115* 126* 115*        MR brain wo IV contrast   Final Result   1. No evidence of acute infarct, or other acute findings. No apparent   interval change from 2018 brain MRI examination.   2. Right cerebral encephalomalacia consistent with large old right   MCA infarct.   3. Old lacunar infarct involving left basal ganglia and adjacent  left   cerebral deep white matter.   4. Redemonstrated is an advanced degree of presumed chronic   small-vessel ischemic change in the cerebral white matter s.        I personally reviewed the images/study and I agree with the findings   as stated by Resident Dr. Tarah Olivares MD. This study was   interpreted at [facility],   [place], [place].        MACRO:   None        Signed by: Shane Tracey 8/5/2025 3:19 PM   Dictation workstation:   [id number]      CT brain attack angio head and neck W and WO IV contrast   Final Result   CT angiography of the carotid and vertebral circulation is within   normal limits.        MACRO:   none        Signed by: Dinesh Hirsch 8/3/2025 6:34 PM   Dictation workstation:   [id number]      CT brain attack head wo IV contrast   Final Result   Large area of remote infarction and extensive diffuse confluent areas   of subcortical and periventricular white matter changes suggestive of   chronic small vessel ischemic disease. Findings similar to previous   study. No evidence of acute cortical infarct or intracranial   hemorrhage. If there is persistent clinical concern for acute   cortical infarction, MRI with diffusion-weighted images is a better   means for further evaluation as clinically warranted.        MACRO:   None        Signed by: Benitez Guerrero 8/3/2025 6:15 PM   Dictation workstation:   [id number]            Physical Exam    Constitutional   General appearance: Alert and in no acute distress.   Pulmonary   Respiratory assessment: No respiratory distress, normal respiratory rhythm and effort.    Auscultation of Lungs: Clear bilateral breath sounds.   Cardiovascular   Auscultation of heart: Apical pulse normal, heart rate and rhythm normal, normal S1 and S2, no murmurs and no pericardial rub.    Exam for edema: No peripheral edema.   Abdomen   Abdominal Exam: No bruits, normal bowel sounds, soft, non-tender, no abdominal mass palpated.     Liver and Spleen exam: No hepato-splenomegaly.   Musculoskeletal     Inspection/palpation of joints, bones and muscles: No joint swelling. Normal movement of all extremities.   Neurologic   Cranial nerves: Nerves 2-12 were intact, no focal neuro defects.         Assessment/Plan      #New onset seizure  MRI negative for new CVA  Scheduling EEG  Continue Jose  Will set up with outpatient epilepsy unit for follow-up     #History of CVA  No new CVA seen  Continue aspirin and statins     #Hypertension  Stable continue nifedipine     #Dyslipidemia  Stable continue Lipitor 40 mg     #Parkinson's  #Vascular dementia  Continue Sinemet    #Deconditioning  PT OT input noted       [1] aspirin, 81 mg, oral, Daily  atorvastatin, 40 mg, oral, Nightly  carbidopa-levodopa, 1 tablet, oral, TID  enoxaparin, 30 mg, subcutaneous, q24h  levETIRAcetam, 500 mg, oral, BID  NIFEdipine ER, 90 mg, oral, Daily before breakfast  pantoprazole, 40 mg, oral, Daily before breakfast   Or  pantoprazole, 40 mg, intravenous, Daily before breakfast    [2]    [3] PRN medications: acetaminophen **OR** acetaminophen **OR** acetaminophen, alum-mag hydroxide-simeth, guaiFENesin, melatonin, ondansetron **OR** ondansetron, polyethylene glycol

## 2025-08-05 NOTE — PROGRESS NOTES
08/05/25 0909   Discharge Planning   Assistance Needed Received VM message from daughter Surekha- She is requesting [facility] and [facility], referrals placed in [facility]., pt/ot pending   Expected Discharge Disposition SNF

## 2025-08-05 NOTE — CARE PLAN
The patient's goals for the shift include  maintain safety      The clinical goals for the shift include maintain pt safety      Problem: Pain - Adult  Goal: Verbalizes/displays adequate comfort level or baseline comfort level  Outcome: Progressing     Problem: Safety - Adult  Goal: Free from fall injury  Outcome: Progressing     Problem: Chronic Conditions and Co-morbidities  Goal: Patient's chronic conditions and co-morbidity symptoms are monitored and maintained or improved  Outcome: Progressing

## 2025-08-05 NOTE — PROGRESS NOTES
Occupational Therapy    Evaluation    Patient Name: Marlo Carl  MRN: [id number]  Department: [facility] A 6  Room: [address] [place] [address]  Today's Date: 8/5/2025  Time Calculation  Start Time: 1315  Stop Time: 1335  Time Calculation (min): 20 min        Assessment:  OT Assessment: Pt presents with decrease endurance, balance, strength and cognition, impeding ADL performance and functional mobility. Pt would benefit from skilled OT services to address these deficits and facilitate highest level of function.  Prognosis: Fair  Barriers to Discharge Home: Cognition needs, Physical needs  Cognition Needs: Insight of patient limited regarding functional ability/needs, Cognition-related high falls risk  Physical Needs: Stair navigation into home limited by function/safety, Stair navigation to access bed limited by function/safety, Stair navigation to access bath limited by function/safety, Intermittent mobility assistance needed, Intermittent ADL assistance needed  Evaluation/Treatment Tolerance: Other (Comment) (Cognition)  Medical Staff Made Aware: Yes  End of Session Communication: Bedside nurse  End of Session Patient Position: Up in chair, Alarm on  OT Assessment Results: Decreased ADL status, Decreased upper extremity range of motion, Decreased upper extremity strength, Decreased safe judgment during ADL, Decreased cognition, Decreased endurance, Decreased functional mobility, Decreased IADLs  Prognosis: Fair  Barriers to Discharge: Inaccessible home environment, Decreased caregiver support  Evaluation/Treatment Tolerance: Other (Comment) (Cognition)  Medical Staff Made Aware: Yes  Strengths: Ability to acquire knowledge, Support and attitude of living partners  Barriers to Participation: Comorbidities, Housing layout, Insight into problems  Plan:  Treatment Interventions: ADL retraining, Functional transfer training, UE strengthening/ROM, Endurance training, Cognitive reorientation, Patient/family training, Equipment  evaluation/education, Compensatory technique education  OT Frequency: 3 times per week (During this acute inpatient hospitalization.)  OT Discharge Recommendations: Moderate intensity level of continued care (Based on current functional status and rehab potential, patient is anticipated to tolerate and benefit from 5 or more days per week of skilled rehabilitative therapy after discharge from this acute inpatient hospitalization.)  Equipment Recommended upon Discharge:  (TBD owns FWW)  OT Recommended Transfer Status: Assist of 1  OT - OK to Discharge: Yes (Per POC)  Treatment Interventions: ADL retraining, Functional transfer training, UE strengthening/ROM, Endurance training, Cognitive reorientation, Patient/family training, Equipment evaluation/education, Compensatory technique education    Subjective     OT Visit Info:  OT Received On: 08/05/25  General:  General  Reason for Referral: Pt is a 84 yo male presenting due to facial droop and seizure like activity. CT (-) for acute intracranial abnormality; pending MRI. Pt with relevant history of CVA with L sided weakness as well as Parkinsonism.  Referred By: George Crain MD  Past Medical History Relevant to Rehab: Medical History[1]  Co-Treatment: PT  Co-Treatment Reason: for maximal pt safety and participation  Prior to Session Communication: Bedside nurse  Patient Position Received: Bed, 3 rail up, Alarm on  Preferred Learning Style: auditory, verbal, visual  General Comment: Pt with mild confusion and impulsivity however directable to PT/OT evaluations  Precautions:  Medical Precautions: Fall precautions     Date/Time Vitals Session Patient Position Pulse Resp SpO2 BP MAP (mmHg)    08/05/25 1522 --  --  69  16  96 %  153/79  54            Pain:  Pain Assessment  Pain Assessment: 0-10  0-10 (Numeric) Pain Score: 0 - No pain    Objective   Cognition:  Overall Cognitive Status: Impaired  Orientation Level: Disoriented to situation, Disoriented to time (Pt  "reporting current month as \"June\" and then later \"March\" requiring max cues to reorient to August))  Attention: Exceptions to WFL  Memory: Exceptions to WFL  Insight: Moderate  Impulsive: Moderately  Processing Speed: Delayed           Home Living:  Type of Home: House  Lives With: Spouse (x2 daughters and x2 granddaughters)  Home Adaptive Equipment: Walker rolling or standard, Cane (rollator)  Home Layout: Two level, Stairs to alternate level with rails  Alternate Level Stairs-Rails: Left  Alternate Level Stairs-Number of Steps: 12  Home Access: Stairs to enter with rails  Entrance Stairs-Rails: Both  Entrance Stairs-Number of Steps: 4  Bathroom Shower/Tub: Tub/shower unit  Bathroom Toilet: Handicapped height  Bathroom Equipment: Shower chair with back  Prior Function:  Level of [place]: Needs assistance with ADLs, Needs assistance with homemaking, Needs assistance with functional transfers  Receives Help From: Family  ADL Assistance: Needs assistance (assist with dressing and bathing)  Homemaking Assistance: Needs assistance (Family completes iADLs)  Ambulatory Assistance: Needs assistance (Intermittent assist with ambulation. Pt reports family occasionally \"give a hand\" or helps standing)  Transfers:  (Assist with shower transfers)  Prior Function Comments: (-) driving. Pt denies falls     ADL:  Grooming Assistance: Stand by  Grooming Deficit: Setup, Wash/dry face, Wash/dry hands, Supervision/safety  UE Dressing Assistance: Moderate  UE Dressing Deficit:  (Hospital gown)  LE Dressing Assistance: Maximal  LE Dressing Deficit: Don/doff R sock, Don/doff L sock (Pt attempted)  Activity Tolerance:  Endurance: Tolerates 10 - 20 min exercise with multiple rests  Bed Mobility/Transfers: Bed Mobility  Bed Mobility: Yes  Bed Mobility 1  Bed Mobility 1: Supine to sitting  Level of Assistance 1: Close supervision  Bed Mobility Comments 1: Increased time to complete    Transfers  Transfer: Yes  Transfer 1  Transfer From " 1: Sit to  Transfer to 1: Stand  Technique 1: Sit to stand, Stand to sit  Transfer Device 1: Gait belt, Walker  Transfer Level of Assistance 1: Contact guard  Trials/Comments 1: VCs for hand placement and body positioning when completing STS transfers.      Functional Mobility:  Functional Mobility  Functional Mobility Performed: Yes  Functional Mobility 1  Device 1: Rolling walker  Functional Mobility Support Devices: Gait belt  Assistance 1: Minimum assistance (x2)  Comments 1: Pt demonstrating impulsivity with x min household distance functional mobility with FWW. Min Ax2 for balance/steadying, walker management and sequencing. Max cues.  Sitting Balance:  Static Sitting Balance  Static Sitting-Balance Support: Feet supported  Static Sitting-Level of Assistance: Close supervision, Contact guard  Static Sitting-Comment/Number of Minutes: EOB  Dynamic Sitting Balance  Dynamic Sitting-Balance Support: Feet supported  Dynamic Sitting-Level of Assistance: Contact guard, Minimum assistance  Dynamic Sitting-Balance: Forward lean, Reaching for objects, Reaching across midline  Dynamic Sitting-Comments: EOB  Standing Balance:  Static Standing Balance  Static Standing-Balance Support: Bilateral upper extremity supported  Static Standing-Level of Assistance: Minimum assistance  Static Standing-Comment/Number of Minutes: FWW  Dynamic Standing Balance  Dynamic Standing-Balance Support: Bilateral upper extremity supported  Dynamic Standing-Level of Assistance: Minimum assistance, Moderate assistance  Dynamic Standing-Balance: Turning  Dynamic Standing-Comments: FWW     Vision:Vision - Basic Assessment  Current Vision: No visual deficits  Sensation:  Light Touch: No apparent deficits  Strength:  Strength Comments: Limited bilateral shoulder flexion  Perception:  Inattention/Neglect: Appears intact  Coordination:  Movements are Fluid and Coordinated: No  Finger to Nose: Impaired, Dysmetria  Alternating Toe Taps: Impaired  Heel  to [place]: Impaired  Coordination Comment: mild deficits of BLEs and BUEs noted with coordination testing   Hand Function:  Gross Grasp: Functional  Coordination: Functional  Extremities: RUE   RUE : Exceptions to WFL (Greater than or equal to 3/5 distally) and LUE   LUE: Exceptions to WFL (Greater than or equal to 3/5 distally)    Outcome Measures:[facility] Daily Activity  Putting on and taking off regular lower body clothing: A lot  Bathing (including washing, rinsing, drying): A lot  Putting on and taking off regular upper body clothing: A lot  Toileting, which includes using toilet, bedpan or urinal: A lot  Taking care of personal grooming such as brushing teeth: A little  Eating Meals: A little  Daily Activity - Total Score: 14        Education Documentation  Body Mechanics, taught by Lisa Saleem OT at 8/5/2025  3:45 PM.  Learner: Patient  Readiness: Acceptance  Method: Explanation  Response: Needs Reinforcement    ADL Training, taught by Lisa Saleem OT at 8/5/2025  3:45 PM.  Learner: Patient  Readiness: Acceptance  Method: Explanation  Response: Needs Reinforcement    Education Comments  No comments found.        OP EDUCATION:       Goals:  Encounter Problems       Encounter Problems (Active)       ADLs       Patient will perform UB and LB sponge bathing with minimal assist  level of assistance and use of adaptive techniques/equipment.       Start:  08/05/25    Expected End:  08/19/25            Patient with complete upper body dressing with stand by assist level of assistance donning and doffing all UE clothes with PRN adaptive equipment while seated.       Start:  08/05/25    Expected End:  08/19/25            Patient with complete lower body dressing with minimal assist  level of assistance donning and doffing all LE clothes  with PRN adaptive equipment while seated.       Start:  08/05/25    Expected End:  08/19/25            Patient will complete daily grooming tasks with set-up, supervision level of  assistance and PRN adaptive equipment while seated/standing.       Start:  08/05/25    Expected End:  08/19/25            Patient will complete toileting including hygiene clothing management/hygiene with minimal assist  level of assistance and raised toilet seat and grab bars.       Start:  08/05/25    Expected End:  08/19/25               COGNITION/SAFETY       Patient will demonstrated orientation x 4 with verbal cues and visual cues.       Start:  08/05/25    Expected End:  08/19/25       ORIENTATION            MOBILITY       Patient will perform Functional mobility x Household distances/Community Distances with contact guard assist level of assistance and least restrictive device in order to improve safety and functional mobility.       Start:  08/05/25    Expected End:  08/19/25               TRANSFERS       Patient will perform bed mobility modified independent level of assistance and bed rails in order to improve safety and independence with mobility       Start:  08/05/25    Expected End:  08/19/25            Patient will complete sit to stand transfer with supervision level of assistance and least restrictive device in order to improve safety and prepare for out of bed mobility.       Start:  08/05/25    Expected End:  08/19/25                                     [1]   Past Medical History:  Diagnosis Date    Anemia     Arthritis     BPH with obstruction/lower urinary tract symptoms     Cerebral vascular accident (Multi)     1996, 2016 w/ residual left sided weakness, CT Head 9/8/22  Large area of encephalomalacia of the right MCA territory, unchanged since prior. 3. Advanced chronic microvascular ischemic changes and chronic left basal ganglia lacunar infarct.    CKD (chronic kidney disease)     Cognitive decline     Dementia     on donzepril    Depression     Hearing aid worn     refuses to wear    HL (hearing loss)     Hypertension     OAB (overactive bladder)     PE (pulmonary thromboembolism) (Multi)  09/07/2018    off anticoags    Resting tremor     mild hands    RLS (restless legs syndrome)     Spinal stenosis     Unspecified urinary incontinence

## 2025-08-05 NOTE — CARE PLAN
The patient's goals for the shift include  return home with family  Return h  The clinical goals for the shift include Maintain pt safety    Over the shift, the patient did  make progress toward the following goals.    Problem: Pain - Adult  Goal: Verbalizes/displays adequate comfort level or baseline comfort level  8/5/2025 1344 by Angelica White RN  Outcome: Progressing  Flowsheets (Taken 8/5/2025 1344)  Verbalizes/displays adequate comfort level or baseline comfort level:   Administer analgesics based on type and severity of pain and evaluate response   Encourage patient to monitor pain and request assistance   Assess pain using appropriate pain scale   Implement non-pharmacological measures as appropriate and evaluate response   Consider cultural and social influences on pain and pain management  8/5/2025 1343 by Angelica White RN  Outcome: Progressing  Flowsheets (Taken 8/5/2025 1343)  Verbalizes/displays adequate comfort level or baseline comfort level:   Encourage patient to monitor pain and request assistance   Administer analgesics based on type and severity of pain and evaluate response   Consider cultural and social influences on pain and pain management   Assess pain using appropriate pain scale   Implement non-pharmacological measures as appropriate and evaluate response   Notify Licensed Independent Practitioner if interventions unsuccessful or patient reports new pain     Problem: Safety - Adult  Goal: Free from fall injury  8/5/2025 1344 by Angelica White RN  Outcome: Progressing  Flowsheets (Taken 8/5/2025 1344)  Free from fall injury:   Instruct family/caregiver on patient safety   Based on caregiver fall risk screen, instruct family/caregiver to ask for assistance with transferring infant if caregiver noted to have fall risk factors  8/5/2025 1343 by Angelica White RN  Outcome: Progressing     Problem: Discharge Planning  Goal: Discharge to home or other facility with  appropriate resources  8/5/2025 1344 by Angelica White RN  Outcome: Progressing  Flowsheets (Taken 8/5/2025 1344)  Discharge to home or other facility with appropriate resources:   Identify barriers to discharge with patient and caregiver   Arrange for needed discharge resources and transportation as appropriate   Arrange for interpreters to assist at discharge as needed   Identify discharge learning needs (meds, wound care, etc)  8/5/2025 1343 by Angelica White RN  Outcome: Progressing     Problem: Chronic Conditions and Co-morbidities  Goal: Patient's chronic conditions and co-morbidity symptoms are monitored and maintained or improved  8/5/2025 1344 by Angelica White RN  Outcome: Progressing  Flowsheets (Taken 8/5/2025 1344)  Care Plan - Patient's Chronic Conditions and Co-Morbidity Symptoms are Monitored and Maintained or Improved:   Monitor and assess patient's chronic conditions and comorbid symptoms for stability, deterioration, or improvement   Collaborate with multidisciplinary team to address chronic and comorbid conditions and prevent exacerbation or deterioration   Update acute care plan with appropriate goals if chronic or comorbid symptoms are exacerbated and prevent overall improvement and discharge  8/5/2025 1343 by Angelica White RN  Outcome: Progressing     Problem: Nutrition  Goal: Nutrient intake appropriate for maintaining nutritional needs  8/5/2025 1344 by Angelica White RN  Outcome: Progressing  8/5/2025 1343 by Angelica White RN  Outcome: Progressing     Problem: Skin  Goal: Decreased wound size/increased tissue granulation at next dressing change  8/5/2025 1344 by Angelica White, RN  Outcome: Progressing  Flowsheets (Taken 8/5/2025 1344)  Decreased wound size/increased tissue granulation at next dressing change:   Promote sleep for wound healing   Protective dressings over bony prominences  8/5/2025 1343 by Angelica White, RN  Outcome:  Progressing  Goal: Participates in plan/prevention/treatment measures  8/5/2025 1344 by Angelica White RN  Outcome: Progressing  Flowsheets (Taken 8/5/2025 1344)  Participates in plan/prevention/treatment measures:   Discuss with provider PT/OT consult   Elevate heels   Increase activity/out of bed for meals  8/5/2025 1343 by Angelica White RN  Outcome: Progressing  Goal: Prevent/manage excess moisture  8/5/2025 1344 by Angelica White RN  Outcome: Progressing  Flowsheets (Taken 8/5/2025 1344)  Prevent/manage excess moisture:   Monitor for/manage infection if present   Cleanse incontinence/protect with barrier cream   Follow provider orders for dressing changes  8/5/2025 1343 by Angelica White RN  Outcome: Progressing  Goal: Prevent/minimize sheer/friction injuries  8/5/2025 1344 by Angelica White RN  Outcome: Progressing  Flowsheets (Taken 8/5/2025 1344)  Prevent/minimize sheer/friction injuries:   Use pull sheet   Increase activity/out of bed for meals   HOB 30 degrees or less   Turn/reposition every 2 hours/use positioning/transfer devices  8/5/2025 1343 by Angelica White RN  Outcome: Progressing  Goal: Promote/optimize nutrition  8/5/2025 1344 by Angelica White RN  Outcome: Progressing  Flowsheets (Taken 8/5/2025 1344)  Promote/optimize nutrition:   Assist with feeding   Consume > 50% meals/supplements   Monitor/record intake including meals   Offer water/supplements/favorite foods  8/5/2025 1343 by Angelica White RN  Outcome: Progressing  Goal: Promote skin healing  8/5/2025 1344 by Angelica White RN  Outcome: Progressing  Flowsheets (Taken 8/5/2025 1344)  Promote skin healing:   Turn/reposition every 2 hours/use positioning/transfer devices   Rotate device position/do not position patient on device   Protective dressings over bony prominences   Assess skin/pad under line(s)/device(s)  8/5/2025 1343 by Angelica White RN  Outcome: Progressing

## 2025-08-05 NOTE — PROGRESS NOTES
Physical Therapy    Physical Therapy Evaluation    Patient Name: Marlo Carl  MRN: [id number]  Department: [facility]   Room: [address] [place] [address]  Today's Date: 8/5/2025   Time Calculation  Start Time: 1314  Stop Time: 1334  Time Calculation (min): 20 min    Assessment/Plan   PT Assessment  PT Assessment Results: Decreased strength, Decreased endurance, Impaired balance, Decreased mobility, Decreased cognition, Impaired judgement, Decreased safety awareness, Decreased coordination  Rehab Prognosis: Good  Barriers to Discharge Home: Caregiver assistance, Cognition needs, Physical needs  Caregiver Assistance: Caregiver assistance needed per identified barriers - however, level of patient's required assistance exceeds assistance available at home  Cognition Needs: 24hr supervision for safety awareness needed, Insight of patient limited regarding functional ability/needs, Cognition-related high falls risk  Physical Needs: Stair navigation into home limited by function/safety, Stair navigation to access bed limited by function/safety, Stair navigation to access bath limited by function/safety, In-home setup navigation limited by function/safety, Ambulating household distances limited by function/safety, 24hr mobility assistance needed, 24hr ADL assistance needed  Evaluation/Treatment Tolerance: Patient tolerated treatment well, Patient limited by fatigue  Medical Staff Made Aware: Yes  Strengths: Support and attitude of living partners  Barriers to Participation: Housing layout, Comorbidities, Insight into problems  End of Session Communication: Bedside nurse  Assessment Comment: Pt demonstrating deficits in generalized strength, endurance and balance as well as increased pain resulting in impaired functional mobility, gait and self care. Due to the impairments listed above, pt would benefit from skilled physical therapy services in acute care setting as well as additional therapy in MOD intensity therapy setting  End of Session Patient  Position: Up in chair, Alarm on  IP OR SWING BED PT PLAN  Inpatient or Swing Bed: Inpatient  PT Plan  Treatment/Interventions: Bed mobility, Transfer training, Gait training, Stair training, Balance training, Neuromuscular re-education, Strengthening, Endurance training, Therapeutic exercise, Therapeutic activity, Home exercise program  PT Plan: Ongoing PT  PT Frequency: 3 times per week (During this acute inpatient hospitalization.)  PT Discharge Recommendations: Moderate intensity level of continued care (Based on current functional status and rehab potential, patient is anticipated to tolerate and benefit from 5 or more days per week of skilled rehabilitative therapy after discharge from this acute inpatient hospitalization.)  Equipment Recommended upon Discharge:  (TBD owns FWW)  PT Recommended Transfer Status: Assist x1, Assist x2  PT - OK to Discharge: Yes (PT POC Initiated this date)    Subjective     PT Visit Info:  PT Received On: 08/05/25  General Visit Information:  General  Reason for Referral: Pt is a 84 yo male presenting due to facial droop and seizure like activity. CT (-) for acute intracranial abnormality; pending MRI. Pt with relevant history of CVA with L sided weakness as well as Parkinsonism.  Referred By: George Crain MD  Past Medical History Relevant to Rehab: Medical History[1]    Co-Treatment: OT  Co-Treatment Reason: for maximal pt safety and participation  Prior to Session Communication: Bedside nurse  Patient Position Received: Bed, 3 rail up, Alarm on  General Comment: Pt with mild confusion and impulsivity however directable to PT/OT evaluations  Home Living:  Home Living  Type of Home: House  Lives With: Spouse (x2 daughters and x2 granddaughters)  Home Adaptive Equipment: Walker rolling or standard, Cane (rollator)  Home Layout: Two level, Stairs to alternate level with rails  Alternate Level Stairs-Rails: Left  Alternate Level Stairs-Number of Steps: 12  Home Access: Stairs to  "enter with rails  Entrance Stairs-Rails: Both  Entrance Stairs-Number of Steps: 4  Bathroom Shower/Tub: Tub/shower unit  Bathroom Toilet: Handicapped height  Bathroom Equipment: Shower chair with back  Prior Level of Function:  Prior Function Per Pt/Caregiver Report  Level of [place]: Independent with ADLs and functional transfers, Independent with homemaking with ambulation  Receives Help From: Family  ADL Assistance: Needs assistance (assist with dressing and bathing)  Homemaking Assistance: Needs assistance  Ambulatory Assistance: Independent (Intermittent assist with ambulation. Pt reports family occasionally \"give a hand\" or helps standing)  Prior Function Comments: (-) driving. Pt denies falls  Precautions:  Precautions  Medical Precautions: Fall precautions             Objective   Pain:  Pain Assessment  Pain Assessment: 0-10  0-10 (Numeric) Pain Score: 0 - No pain  Cognition:  Cognition  Overall Cognitive Status: Impaired  Orientation Level: Disoriented to situation, Disoriented to time (Pt reporting current month as \"June\" and then later \"March\" requiring max cues to reorient to August)  Attention: Exceptions to WFL  Memory: Exceptions to WFL  Insight: Moderate  Impulsive: Moderately  Processing Speed: Delayed    General Assessments:  Activity Tolerance  Endurance: Tolerates 10 - 20 min exercise with multiple rests    Sensation  Light Touch: No apparent deficits    Coordination  Movements are Fluid and Coordinated: No  Finger to Nose: Impaired, Dysmetria  Alternating Toe Taps: Impaired  Heel to Shin: Impaired  Coordination Comment: mild deficits of BLEs and BUEs noted with coordination testing    Static Sitting Balance  Static Sitting-Balance Support: No upper extremity supported, Feet supported  Static Sitting-Level of Assistance: Contact guard  Static Sitting-Comment/Number of Minutes: 2 min  Dynamic Sitting Balance  Dynamic Sitting-Balance Support: No upper extremity supported  Dynamic Sitting-Level " of Assistance: Minimum assistance  Dynamic Sitting-Balance:  (reaching and LB dressing activities)    Static Standing Balance  Static Standing-Balance Support: Bilateral upper extremity supported (FWW)  Static Standing-Level of Assistance: Minimum assistance  Static Standing-Comment/Number of Minutes: 2 min  Functional Assessments:  Bed Mobility  Bed Mobility: Yes  Bed Mobility 1  Bed Mobility 1: Supine to sitting  Level of Assistance 1: Close supervision  Bed Mobility Comments 1: Increased time to complete    Transfers  Transfer: Yes  Transfer 1  Transfer From 1: Sit to  Transfer to 1: Stand  Technique 1: Sit to stand, Stand to sit  Transfer Device 1: Gait belt, Walker  Transfer Level of Assistance 1: Contact guard  Trials/Comments 1: VCs for hand placement    Ambulation/Gait Training  Ambulation/Gait Training Performed: Yes  Ambulation/Gait Training 1  Surface 1: Level tile  Device 1: Rolling walker  Gait Support Devices: Gait belt  Assistance 1: Minimum assistance, Moderate assistance (x2 (primarily min Ax2 however occasional mod A x2 due to LOB))  Quality of Gait 1:  (reduced B step length and gait yi. Mild shuffling steps. Pt impulsive and difficult to direct to mobility requiring assistance for balance and max verbal cues. Pt demonstrates occasional LOB requiring increased assist to maintain)  Comments/Distance (ft) 1: 12ft  Extremity/Trunk Assessments:  RLE   RLE : Exceptions to WFL (grossly 3/5)  LLE   LLE : Exceptions to WFL (grossly 3/5)  Outcome Measures:  [facility] Basic Mobility  Turning from your back to your side while in a flat bed without using bedrails: A little  Moving from lying on your back to sitting on the side of a flat bed without using bedrails: A little  Moving to and from bed to chair (including a wheelchair): A little  Standing up from a chair using your arms (e.g. wheelchair or bedside chair): A little  To walk in hospital room: A lot  Climbing 3-5 steps with railing: Total  Basic  Mobility - Total Score: 15    Encounter Problems       Encounter Problems (Active)       Mobility       LTG - Patient will navigate 4-6 steps with rails/device       Start:  08/05/25    Expected End:  08/19/25       CGA using HR         STG - Patient will ambulate       Start:  08/05/25    Expected End:  08/19/25       SUP using FWW >100ft            PT Transfers       STG - Patient will perform bed mobility       Start:  08/05/25    Expected End:  08/19/25       Mod Ind         STG - Patient will transfer sit to and from stand       Start:  08/05/25    Expected End:  08/19/25       Mod Ind                Education Documentation  Precautions, taught by Kurtis Lemus, PT at 8/5/2025  3:11 PM.  Learner: Patient  Readiness: Acceptance  Method: Explanation  Response: Needs Reinforcement  Comment: see above; limited due to impaired cognition    Body Mechanics, taught by Kurtis Lemus, PT at 8/5/2025  3:11 PM.  Learner: Patient  Readiness: Acceptance  Method: Explanation  Response: Needs Reinforcement  Comment: see above; limited due to impaired cognition    Mobility Training, taught by Kurtis Lemus, PT at 8/5/2025  3:11 PM.  Learner: Patient  Readiness: Acceptance  Method: Explanation  Response: Needs Reinforcement  Comment: see above; limited due to impaired cognition    Education Comments  No comments found.                 [1]   Past Medical History:  Diagnosis Date    Anemia     Arthritis     BPH with obstruction/lower urinary tract symptoms     Cerebral vascular accident (Multi)     1996, 2016 w/ residual left sided weakness, CT Head 9/8/22  Large area of encephalomalacia of the right MCA territory, unchanged since prior. 3. Advanced chronic microvascular ischemic changes and chronic left basal ganglia lacunar infarct.    CKD (chronic kidney disease)     Cognitive decline     Dementia     on donzepril    Depression     Hearing aid worn     refuses to wear    HL (hearing loss)     Hypertension      OAB (overactive bladder)     PE (pulmonary thromboembolism) (Multi) 09/07/2018    off anticoags    Resting tremor     mild hands    RLS (restless legs syndrome)     Spinal stenosis     Unspecified urinary incontinence

## 2025-08-06 LAB
ANION GAP SERPL CALC-SCNC: 13 MMOL/L (ref 10–20)
APPEARANCE UR: CLEAR
BACTERIA #/AREA URNS AUTO: ABNORMAL /HPF
BILIRUB UR STRIP.AUTO-MCNC: NEGATIVE MG/DL
BUN SERPL-MCNC: 32 MG/DL (ref 6–23)
CALCIUM SERPL-MCNC: 8.6 MG/DL (ref 8.6–10.3)
CHLORIDE SERPL-SCNC: 101 MMOL/L (ref 98–107)
CO2 SERPL-SCNC: 25 MMOL/L (ref 21–32)
COLOR UR: COLORLESS
CREAT SERPL-MCNC: 1.94 MG/DL (ref 0.5–1.3)
EGFRCR SERPLBLD CKD-EPI 2021: 33 ML/MIN/1.73M*2
ERYTHROCYTE [DISTWIDTH] IN BLOOD BY AUTOMATED COUNT: 14.4 % (ref 11.5–14.5)
GLUCOSE SERPL-MCNC: 83 MG/DL (ref 74–99)
GLUCOSE UR STRIP.AUTO-MCNC: NORMAL MG/DL
HCT VFR BLD AUTO: 35.9 % (ref 41–52)
HGB BLD-MCNC: 12.2 G/DL (ref 13.5–17.5)
KETONES UR STRIP.AUTO-MCNC: NEGATIVE MG/DL
LEUKOCYTE ESTERASE UR QL STRIP.AUTO: ABNORMAL
MCH RBC QN AUTO: 27.9 PG (ref 26–34)
MCHC RBC AUTO-ENTMCNC: 34 G/DL (ref 32–36)
MCV RBC AUTO: 82 FL (ref 80–100)
NITRITE UR QL STRIP.AUTO: NEGATIVE
NRBC BLD-RTO: 0 /100 WBCS (ref 0–0)
PH UR STRIP.AUTO: 7 [PH]
PLATELET # BLD AUTO: 216 X10*3/UL (ref 150–450)
POTASSIUM SERPL-SCNC: 4.4 MMOL/L (ref 3.5–5.3)
PROT UR STRIP.AUTO-MCNC: ABNORMAL MG/DL
RBC # BLD AUTO: 4.38 X10*6/UL (ref 4.5–5.9)
RBC # UR STRIP.AUTO: ABNORMAL MG/DL
RBC #/AREA URNS AUTO: ABNORMAL /HPF
SODIUM SERPL-SCNC: 135 MMOL/L (ref 136–145)
SP GR UR STRIP.AUTO: 1.01
UROBILINOGEN UR STRIP.AUTO-MCNC: NORMAL MG/DL
WBC # BLD AUTO: 4 X10*3/UL (ref 4.4–11.3)
WBC #/AREA URNS AUTO: ABNORMAL /HPF

## 2025-08-06 PROCEDURE — 2500000002 HC RX 250 W HCPCS SELF ADMINISTERED DRUGS (ALT 637 FOR MEDICARE OP, ALT 636 FOR OP/ED): Performed by: INTERNAL MEDICINE

## 2025-08-06 PROCEDURE — 1200000002 HC GENERAL ROOM WITH TELEMETRY DAILY

## 2025-08-06 PROCEDURE — 2500000004 HC RX 250 GENERAL PHARMACY W/ HCPCS (ALT 636 FOR OP/ED): Performed by: INTERNAL MEDICINE

## 2025-08-06 PROCEDURE — 85027 COMPLETE CBC AUTOMATED: CPT | Performed by: INTERNAL MEDICINE

## 2025-08-06 PROCEDURE — 80048 BASIC METABOLIC PNL TOTAL CA: CPT | Performed by: INTERNAL MEDICINE

## 2025-08-06 PROCEDURE — 81001 URINALYSIS AUTO W/SCOPE: CPT

## 2025-08-06 PROCEDURE — 2500000001 HC RX 250 WO HCPCS SELF ADMINISTERED DRUGS (ALT 637 FOR MEDICARE OP)

## 2025-08-06 PROCEDURE — 99231 SBSQ HOSP IP/OBS SF/LOW 25: CPT | Performed by: INTERNAL MEDICINE

## 2025-08-06 PROCEDURE — 36415 COLL VENOUS BLD VENIPUNCTURE: CPT | Performed by: INTERNAL MEDICINE

## 2025-08-06 PROCEDURE — 87086 URINE CULTURE/COLONY COUNT: CPT | Mod: AHULAB

## 2025-08-06 PROCEDURE — 2500000001 HC RX 250 WO HCPCS SELF ADMINISTERED DRUGS (ALT 637 FOR MEDICARE OP): Performed by: INTERNAL MEDICINE

## 2025-08-06 RX ADMIN — LEVETIRACETAM 500 MG: 500 TABLET, FILM COATED ORAL at 09:13

## 2025-08-06 RX ADMIN — NIFEDIPINE 90 MG: 30 TABLET, FILM COATED, EXTENDED RELEASE ORAL at 06:30

## 2025-08-06 RX ADMIN — CARBIDOPA AND LEVODOPA 1 TABLET: 25; 100 TABLET ORAL at 20:24

## 2025-08-06 RX ADMIN — CARBIDOPA AND LEVODOPA 1 TABLET: 25; 100 TABLET ORAL at 09:13

## 2025-08-06 RX ADMIN — LEVETIRACETAM 500 MG: 500 TABLET, FILM COATED ORAL at 20:24

## 2025-08-06 RX ADMIN — ATORVASTATIN CALCIUM 40 MG: 40 TABLET, FILM COATED ORAL at 20:24

## 2025-08-06 RX ADMIN — PANTOPRAZOLE SODIUM 40 MG: 40 TABLET, DELAYED RELEASE ORAL at 06:31

## 2025-08-06 RX ADMIN — POLYETHYLENE GLYCOL 3350 17 G: 17 POWDER, FOR SOLUTION ORAL at 18:49

## 2025-08-06 RX ADMIN — ENOXAPARIN SODIUM 30 MG: 30 INJECTION SUBCUTANEOUS at 09:12

## 2025-08-06 RX ADMIN — CARBIDOPA AND LEVODOPA 1 TABLET: 25; 100 TABLET ORAL at 15:03

## 2025-08-06 RX ADMIN — ASPIRIN 81 MG CHEWABLE TABLET 81 MG: 81 TABLET CHEWABLE at 09:13

## 2025-08-06 ASSESSMENT — PAIN - FUNCTIONAL ASSESSMENT
PAIN_FUNCTIONAL_ASSESSMENT: 0-10
PAIN_FUNCTIONAL_ASSESSMENT: 0-10

## 2025-08-06 ASSESSMENT — PAIN SCALES - GENERAL
PAINLEVEL_OUTOF10: 0 - NO PAIN
PAINLEVEL_OUTOF10: 0 - NO PAIN

## 2025-08-06 NOTE — CARE PLAN
The patient's goals for the shift include      The clinical goals for the shift include Maintain Safety    Over the shift, the patient did not make progress toward the following goals. Barriers to progression include . Recommendations to address these barriers include .

## 2025-08-06 NOTE — PROGRESS NOTES
Occupational Therapy                 Therapy Communication Note    Patient Name: Marlo Carl  MRN: [id number]  Department: [facility] 6  Room: [address] [place] [address]  Today's Date: 8/6/2025     Discipline: Occupational Therapy    Missed Visit: OT Missed Visit: Yes     Missed Visit Reason: Missed Visit Reason: Patient in a medical procedure (Pt currently recieving an EEG at this time.)    Missed Time: Attempt

## 2025-08-06 NOTE — CARE PLAN
The patient's goals for the shift include      The clinical goals for the shift include Maintain Safety    Over the shift, the patient did not make progress toward the following goals. Barriers to progression include weakness; unsteady gait. Recommendations to address these barriers include continue current treatment plan.

## 2025-08-06 NOTE — PROGRESS NOTES
Medicine NP follow up     Subjective:  Seen earlier, up in a chair eating, no complaints.   Voice gurgly but no issues swallowing per pt or RN.   UA noted, pt denies clear urinary symptoms but is not a great historian, cx pending.   Baseline L sided weakness.     Vitals (Last 24 Hours):  Heart Rate:  [66-83]   Temp:  [36.5 °C (97.7 °F)-36.8 °C (98.2 °F)]   Resp:  [13-16]   BP: (118-176)/(65-87)   SpO2:  [97 %-100 %]     I have reviewed imaging reports and labs from this visit    PHYSICAL EXAM:  Constitutional: NAD, alert and cooperative  Eyes: no icterus  ENMT: mucous membranes moist, voice gurgly   Head/Neck: supple  Respiratory/Thorax: CTA bilaterally, non-labored breathing, no cough, on RA  Cardiovascular: RRR, no murmurs heard  Gastrointestinal: ND/S/NT  : no [place]  Musculoskeletal: no joint swelling  Extremities: no edema  Neurological: mild L sided weakness in a chair, otherwise grossly non-focal  Skin: warm and dry  Psych: calm, stable mood     MEDS:  Scheduled meds  aspirin, 81 mg, oral, Daily  atorvastatin, 40 mg, oral, Nightly  carbidopa-levodopa, 1 tablet, oral, TID  enoxaparin, 30 mg, subcutaneous, q24h  levETIRAcetam, 500 mg, oral, BID  NIFEdipine ER, 90 mg, oral, Daily before breakfast  pantoprazole, 40 mg, oral, Daily before breakfast   Or  pantoprazole, 40 mg, intravenous, Daily before breakfast  PRN meds  PRN medications: acetaminophen **OR** acetaminophen **OR** acetaminophen, alum-mag hydroxide-simeth, guaiFENesin, melatonin, ondansetron **OR** ondansetron, polyethylene glycol    ASSESSMENT/PLAN:  This is a 85 y.o. male with PMH of dementia, CVA with residual L sided weakness in 1996, anemia, arthritis, BPH, CKD, depression, HTN, PE, resting tremor, parkinsonism, RLS, spinal stenosis, prior hx staring spells with alterations in consciousness (neuro Dr Dowd), presented with seizure-like activity, loaded with Keppra, transitioned to Keppra PO.     New onset sz   Hx CVA   -MRI negative for new  CVA  -sz precautions in place, EEG normal   -continue Keppra, outpt FU with neuro    -continue ASA/statin     Elevated troponin   -c/w prior levels and EKG unchanged from prior    Parkinsonism   -recently started on sinemet in June     HTN  -variable BP, acceptable overall, monitor on chronic Nifedipine     CKD  -cr at baseline     Pyuria   -no leukocytosis, FU Ucx     Other comorbidities as above  -continue medications as ordered and adjust based on clinical course     VTE / GI prophylaxis   -subcutaneous Lovenox, PPI, bowel regimen in place     Discharge planning  -SNF/auth, med ready     Discussed with Dr. Crain and the interdisciplinary team     Divine Membreno, APRN-CNP

## 2025-08-06 NOTE — PROGRESS NOTES
08/06/25 1144   Discharge Planning   Assistance Needed Adv He Carrero is FOC, DSC has been asked to start auth 8/6/25, EEG has been completed, ADOD today/tomorrow   Expected Discharge Disposition SNF   Does the patient need discharge transport arranged? Yes   [facility] coordination needed? Yes   Patient Choice   Provider Choice list and CMS website (https://medicare.gov/care-compare#search) for post-acute Quality and Resource Measure Data were provided and reviewed with: Family

## 2025-08-06 NOTE — CARE PLAN
The patient's goals for the shift include  feel better    The clinical goals for the shift include Maintain pt safety    Over the shift, the patient did make progress toward the following goals.   Problem: Pain - Adult  Goal: Verbalizes/displays adequate comfort level or baseline comfort level  Outcome: Progressing  Flowsheets (Taken 8/6/2025 1413)  Verbalizes/displays adequate comfort level or baseline comfort level:   Assess pain using appropriate pain scale   Administer analgesics based on type and severity of pain and evaluate response   Implement non-pharmacological measures as appropriate and evaluate response   Consider cultural and social influences on pain and pain management   Encourage patient to monitor pain and request assistance     Problem: Safety - Adult  Goal: Free from fall injury  Outcome: Progressing  Flowsheets (Taken 8/6/2025 1413)  Free from fall injury:   Instruct family/caregiver on patient safety   Based on caregiver fall risk screen, instruct family/caregiver to ask for assistance with transferring infant if caregiver noted to have fall risk factors     Problem: Discharge Planning  Goal: Discharge to home or other facility with appropriate resources  Outcome: Progressing  Flowsheets (Taken 8/6/2025 1413)  Discharge to home or other facility with appropriate resources:   Identify discharge learning needs (meds, wound care, etc)   Arrange for interpreters to assist at discharge as needed   Arrange for needed discharge resources and transportation as appropriate   Refer to discharge planning if patient needs post-hospital services based on physician order or complex needs related to functional status, cognitive ability or social support system     Problem: Chronic Conditions and Co-morbidities  Goal: Patient's chronic conditions and co-morbidity symptoms are monitored and maintained or improved  Outcome: Progressing  Flowsheets (Taken 8/6/2025 1413)  Care Plan - Patient's Chronic Conditions  and Co-Morbidity Symptoms are Monitored and Maintained or Improved:   Collaborate with multidisciplinary team to address chronic and comorbid conditions and prevent exacerbation or deterioration   Monitor and assess patient's chronic conditions and comorbid symptoms for stability, deterioration, or improvement   Update acute care plan with appropriate goals if chronic or comorbid symptoms are exacerbated and prevent overall improvement and discharge     Problem: Nutrition  Goal: Nutrient intake appropriate for maintaining nutritional needs  Outcome: Progressing     Problem: Skin  Goal: Decreased wound size/increased tissue granulation at next dressing change  Outcome: Progressing  Flowsheets (Taken 8/6/2025 1413)  Decreased wound size/increased tissue granulation at next dressing change:   Promote sleep for wound healing   Protective dressings over bony prominences   Utilize specialty bed per algorithm  Goal: Participates in plan/prevention/treatment measures  Outcome: Progressing  Flowsheets (Taken 8/6/2025 1413)  Participates in plan/prevention/treatment measures:   Discuss with provider PT/OT consult   Increase activity/out of bed for meals   Elevate heels  Goal: Prevent/manage excess moisture  Outcome: Progressing  Flowsheets (Taken 8/6/2025 1413)  Prevent/manage excess moisture:   Moisturize dry skin   Cleanse incontinence/protect with barrier cream   Follow provider orders for dressing changes   Monitor for/manage infection if present  Goal: Prevent/minimize sheer/friction injuries  Outcome: Progressing  Flowsheets (Taken 8/6/2025 1413)  Prevent/minimize sheer/friction injuries:   HOB 30 degrees or less   Increase activity/out of bed for meals   Turn/reposition every 2 hours/use positioning/transfer devices   Use pull sheet  Goal: Promote/optimize nutrition  Outcome: Progressing  Flowsheets (Taken 8/6/2025 1413)  Promote/optimize nutrition:   Assist with feeding   Offer water/supplements/favorite foods    Consume > 50% meals/supplements   Monitor/record intake including meals  Goal: Promote skin healing  Outcome: Progressing  Flowsheets (Taken 8/6/2025 1413)  Promote skin healing:   Assess skin/pad under line(s)/device(s)   Rotate device position/do not position patient on device   Protective dressings over bony prominences   Turn/reposition every 2 hours/use positioning/transfer devices     Problem: Fall/Injury  Goal: Not fall by end of shift  Outcome: Progressing  Goal: Be free from injury by end of the shift  Outcome: Progressing  Goal: Verbalize understanding of personal risk factors for fall in the hospital  Outcome: Progressing  Goal: Verbalize understanding of risk factor reduction measures to prevent injury from fall in the home  Outcome: Progressing  Goal: Use assistive devices by end of the shift  Outcome: Progressing  Goal: Pace activities to prevent fatigue by end of the shift  Outcome: Progressing

## 2025-08-06 NOTE — PROGRESS NOTES
Marlo Carl is a 85 y.o. male     EEG done and results pending      Review of Systems     Constitutional: no fever, no chills, not feeling poorly, not feeling tired   Cardiovascular: no chest pain   Respiratory: no cough, wheezing or shortness of breath a  Gastrointestinal: no abdominal pain, no constipation, no melena, no nausea, no diarrhea, no vomiting and no blood in stools.   Neurological: no headache,   All other systems have been reviewed and are negative for complaint.       Vitals:    08/06/25 1206   BP: 118/65   Pulse:    Resp:    Temp: 36.6 °C (97.9 °F)   SpO2: 99%        Scheduled medications  Scheduled Medications[1]  Continuous medications  Continuous Medications[2]  PRN medications  PRN Medications[3]    Lab Review   Results from last 7 days   Lab Units 08/06/25  0544 08/05/25  0554 08/04/25  0947   WBC AUTO x10*3/uL 4.0* 5.4 4.7   HEMOGLOBIN g/dL 12.2* 12.1* 12.5*   HEMATOCRIT % 35.9* 37.5* 39.4*   PLATELETS AUTO x10*3/uL 216 216 206     Results from last 7 days   Lab Units 08/06/25  0544 08/05/25  0554 08/04/25  0947 08/03/25  1822   SODIUM mmol/L 135* 137 137 133*   POTASSIUM mmol/L 4.4 4.5 4.4 4.3   CHLORIDE mmol/L 101 103 102 102   CO2 mmol/L 25 29 24 26   BUN mg/dL 32* 28* 23 28*   CREATININE mg/dL 1.94* 1.97* 1.71* 1.85*   CALCIUM mg/dL 8.6 9.0 8.8 8.0*   PROTEIN TOTAL g/dL  --   --   --  6.7   BILIRUBIN TOTAL mg/dL  --   --   --  0.3   ALK PHOS U/L  --   --   --  72   ALT U/L  --   --   --  8*   AST U/L  --   --   --  12   GLUCOSE mg/dL 83 93 109* 85     Results from last 7 days   Lab Units 08/04/25  1843 08/04/25  0947 08/03/25  1955   TROPHS ng/L 115* 126* 115*        MR brain wo IV contrast   Final Result   1. No evidence of acute infarct, or other acute findings. No apparent   interval change from 2018 brain MRI examination.   2. Right cerebral encephalomalacia consistent with large old right   MCA infarct.   3. Old lacunar infarct involving left basal ganglia and adjacent left   cerebral  deep white matter.   4. Redemonstrated is an advanced degree of presumed chronic   small-vessel ischemic change in the cerebral white matter s.        I personally reviewed the images/study and I agree with the findings   as stated by Resident Dr. Tarah Olivares MD. This study was   interpreted at [facility],   [place], [place].        MACRO:   None        Signed by: Shane Tracey 8/5/2025 3:19 PM   Dictation workstation:   [id number]      CT brain attack angio head and neck W and WO IV contrast   Final Result   CT angiography of the carotid and vertebral circulation is within   normal limits.        MACRO:   none        Signed by: Dinesh Hirsch 8/3/2025 6:34 PM   Dictation workstation:   [id number]      CT brain attack head wo IV contrast   Final Result   Large area of remote infarction and extensive diffuse confluent areas   of subcortical and periventricular white matter changes suggestive of   chronic small vessel ischemic disease. Findings similar to previous   study. No evidence of acute cortical infarct or intracranial   hemorrhage. If there is persistent clinical concern for acute   cortical infarction, MRI with diffusion-weighted images is a better   means for further evaluation as clinically warranted.        MACRO:   None        Signed by: Benitez Guerrero 8/3/2025 6:15 PM   Dictation workstation:   [id number]            Physical Exam    Constitutional   General appearance: Alert and in no acute distress.   Pulmonary   Respiratory assessment: No respiratory distress, normal respiratory rhythm and effort.    Auscultation of Lungs: Clear bilateral breath sounds.   Cardiovascular   Auscultation of heart: Apical pulse normal, heart rate and rhythm normal, normal S1 and S2, no murmurs and no pericardial rub.    Exam for edema: No peripheral edema.   Abdomen   Abdominal Exam: No bruits, normal bowel sounds, soft, non-tender, no abdominal mass palpated.    Liver and Spleen  exam: No hepato-splenomegaly.   Musculoskeletal     Inspection/palpation of joints, bones and muscles: No joint swelling. Normal movement of all extremities.   Neurologic   Cranial nerves: Nerves 2-12 were intact, no focal neuro defects.         Assessment/Plan      #New onset seizure  MRI negative for new CVA  EEG pending     #History of CVA  No new CVA seen  Continue aspirin and statins     #Hypertension  Stable continue nifedipine     #Dyslipidemia  Stable continue Lipitor 40 mg     #Parkinson's  #Vascular dementia  Continue Sinemet    #Deconditioning  PT OT input noted         [1] aspirin, 81 mg, oral, Daily  atorvastatin, 40 mg, oral, Nightly  carbidopa-levodopa, 1 tablet, oral, TID  enoxaparin, 30 mg, subcutaneous, q24h  levETIRAcetam, 500 mg, oral, BID  NIFEdipine ER, 90 mg, oral, Daily before breakfast  pantoprazole, 40 mg, oral, Daily before breakfast   Or  pantoprazole, 40 mg, intravenous, Daily before breakfast     [2]    [3] PRN medications: acetaminophen **OR** acetaminophen **OR** acetaminophen, alum-mag hydroxide-simeth, guaiFENesin, melatonin, ondansetron **OR** ondansetron, polyethylene glycol

## 2025-08-06 NOTE — PROGRESS NOTES
Physical Therapy                 Therapy Communication Note    Patient Name: Marlo Carl  MRN: [id number]  Department: [facility] A 6  Room: [address] [place] [address]  Today's Date: 8/6/2025     Discipline: Physical Therapy    Patient in a medical procedure  Pt currently recieving an EEG at this time. Pt not available for PT treatment at this time    Missed Time: Attempt

## 2025-08-07 LAB
ANION GAP SERPL CALC-SCNC: 10 MMOL/L (ref 10–20)
BACTERIA UR CULT: NORMAL
BUN SERPL-MCNC: 40 MG/DL (ref 6–23)
CALCIUM SERPL-MCNC: 8.6 MG/DL (ref 8.6–10.3)
CHLORIDE SERPL-SCNC: 102 MMOL/L (ref 98–107)
CO2 SERPL-SCNC: 28 MMOL/L (ref 21–32)
CREAT SERPL-MCNC: 2.32 MG/DL (ref 0.5–1.3)
EGFRCR SERPLBLD CKD-EPI 2021: 27 ML/MIN/1.73M*2
ERYTHROCYTE [DISTWIDTH] IN BLOOD BY AUTOMATED COUNT: 14.5 % (ref 11.5–14.5)
GLUCOSE SERPL-MCNC: 93 MG/DL (ref 74–99)
HCT VFR BLD AUTO: 34.3 % (ref 41–52)
HGB BLD-MCNC: 11.6 G/DL (ref 13.5–17.5)
MCH RBC QN AUTO: 27.8 PG (ref 26–34)
MCHC RBC AUTO-ENTMCNC: 33.8 G/DL (ref 32–36)
MCV RBC AUTO: 82 FL (ref 80–100)
NRBC BLD-RTO: 0 /100 WBCS (ref 0–0)
PLATELET # BLD AUTO: 205 X10*3/UL (ref 150–450)
POTASSIUM SERPL-SCNC: 4.5 MMOL/L (ref 3.5–5.3)
RBC # BLD AUTO: 4.18 X10*6/UL (ref 4.5–5.9)
SODIUM SERPL-SCNC: 135 MMOL/L (ref 136–145)
WBC # BLD AUTO: 4 X10*3/UL (ref 4.4–11.3)

## 2025-08-07 PROCEDURE — 85027 COMPLETE CBC AUTOMATED: CPT | Performed by: INTERNAL MEDICINE

## 2025-08-07 PROCEDURE — 2500000004 HC RX 250 GENERAL PHARMACY W/ HCPCS (ALT 636 FOR OP/ED): Performed by: INTERNAL MEDICINE

## 2025-08-07 PROCEDURE — 97535 SELF CARE MNGMENT TRAINING: CPT | Mod: GO,CO

## 2025-08-07 PROCEDURE — 2500000005 HC RX 250 GENERAL PHARMACY W/O HCPCS: Performed by: INTERNAL MEDICINE

## 2025-08-07 PROCEDURE — 99232 SBSQ HOSP IP/OBS MODERATE 35: CPT | Performed by: INTERNAL MEDICINE

## 2025-08-07 PROCEDURE — 2500000001 HC RX 250 WO HCPCS SELF ADMINISTERED DRUGS (ALT 637 FOR MEDICARE OP): Performed by: INTERNAL MEDICINE

## 2025-08-07 PROCEDURE — 36415 COLL VENOUS BLD VENIPUNCTURE: CPT | Performed by: INTERNAL MEDICINE

## 2025-08-07 PROCEDURE — 2500000002 HC RX 250 W HCPCS SELF ADMINISTERED DRUGS (ALT 637 FOR MEDICARE OP, ALT 636 FOR OP/ED): Performed by: INTERNAL MEDICINE

## 2025-08-07 PROCEDURE — 2500000001 HC RX 250 WO HCPCS SELF ADMINISTERED DRUGS (ALT 637 FOR MEDICARE OP)

## 2025-08-07 PROCEDURE — 82374 ASSAY BLOOD CARBON DIOXIDE: CPT | Performed by: INTERNAL MEDICINE

## 2025-08-07 PROCEDURE — 1100000001 HC PRIVATE ROOM DAILY

## 2025-08-07 RX ORDER — FLUTICASONE PROPIONATE 50 MCG
1 SPRAY, SUSPENSION (ML) NASAL DAILY
COMMUNITY

## 2025-08-07 RX ORDER — GUAIFENESIN 1200 MG/1
1200 TABLET, EXTENDED RELEASE ORAL DAILY
COMMUNITY

## 2025-08-07 RX ADMIN — PANTOPRAZOLE SODIUM 40 MG: 40 TABLET, DELAYED RELEASE ORAL at 06:27

## 2025-08-07 RX ADMIN — LEVETIRACETAM 500 MG: 500 TABLET, FILM COATED ORAL at 09:07

## 2025-08-07 RX ADMIN — CARBIDOPA AND LEVODOPA 1 TABLET: 25; 100 TABLET ORAL at 16:46

## 2025-08-07 RX ADMIN — ASPIRIN 81 MG CHEWABLE TABLET 81 MG: 81 TABLET CHEWABLE at 09:07

## 2025-08-07 RX ADMIN — LEVETIRACETAM 500 MG: 500 TABLET, FILM COATED ORAL at 20:25

## 2025-08-07 RX ADMIN — ATORVASTATIN CALCIUM 40 MG: 40 TABLET, FILM COATED ORAL at 20:25

## 2025-08-07 RX ADMIN — ENOXAPARIN SODIUM 30 MG: 30 INJECTION SUBCUTANEOUS at 09:07

## 2025-08-07 RX ADMIN — CARBIDOPA AND LEVODOPA 1 TABLET: 25; 100 TABLET ORAL at 20:25

## 2025-08-07 RX ADMIN — NIFEDIPINE 90 MG: 30 TABLET, FILM COATED, EXTENDED RELEASE ORAL at 06:26

## 2025-08-07 RX ADMIN — CARBIDOPA AND LEVODOPA 1 TABLET: 25; 100 TABLET ORAL at 09:07

## 2025-08-07 RX ADMIN — Medication 3 MG: at 20:25

## 2025-08-07 ASSESSMENT — PAIN SCALES - GENERAL
PAINLEVEL_OUTOF10: 0 - NO PAIN
PAINLEVEL_OUTOF10: 0 - NO PAIN

## 2025-08-07 ASSESSMENT — ACTIVITIES OF DAILY LIVING (ADL): HOME_MANAGEMENT_TIME_ENTRY: 13

## 2025-08-07 ASSESSMENT — COGNITIVE AND FUNCTIONAL STATUS - GENERAL
PERSONAL GROOMING: A LITTLE
DRESSING REGULAR LOWER BODY CLOTHING: A LOT
DRESSING REGULAR UPPER BODY CLOTHING: A LOT
TOILETING: TOTAL
DAILY ACTIVITIY SCORE: 13
HELP NEEDED FOR BATHING: A LOT
EATING MEALS: A LITTLE

## 2025-08-07 ASSESSMENT — PAIN - FUNCTIONAL ASSESSMENT: PAIN_FUNCTIONAL_ASSESSMENT: 0-10

## 2025-08-07 NOTE — CARE PLAN
Problem: Pain - Adult  Goal: Verbalizes/displays adequate comfort level or baseline comfort level  Outcome: Progressing     Problem: Safety - Adult  Goal: Free from fall injury  Outcome: Progressing     Problem: Discharge Planning  Goal: Discharge to home or other facility with appropriate resources  Outcome: Progressing     Problem: Chronic Conditions and Co-morbidities  Goal: Patient's chronic conditions and co-morbidity symptoms are monitored and maintained or improved  Outcome: Progressing     Problem: Nutrition  Goal: Nutrient intake appropriate for maintaining nutritional needs  Outcome: Progressing     Problem: Skin  Goal: Decreased wound size/increased tissue granulation at next dressing change  Outcome: Progressing  Goal: Participates in plan/prevention/treatment measures  Outcome: Progressing  Goal: Prevent/manage excess moisture  Outcome: Progressing  Goal: Prevent/minimize sheer/friction injuries  Outcome: Progressing  Goal: Promote/optimize nutrition  Outcome: Progressing  Goal: Promote skin healing  Outcome: Progressing     Problem: Fall/Injury  Goal: Not fall by end of shift  Outcome: Progressing  Goal: Be free from injury by end of the shift  Outcome: Progressing  Goal: Verbalize understanding of personal risk factors for fall in the hospital  Outcome: Progressing  Goal: Verbalize understanding of risk factor reduction measures to prevent injury from fall in the home  Outcome: Progressing  Goal: Use assistive devices by end of the shift  Outcome: Progressing  Goal: Pace activities to prevent fatigue by end of the shift  Outcome: Progressing   The patient's goals for the shift include      The clinical goals for the shift include Maintain pt safety    Over the shift, the patient did make progress toward the following goals.

## 2025-08-07 NOTE — PROGRESS NOTES
Care Coordinator Note:    Plan: patient in with new onset sz. Eeg done. Started on keppra. F/up with neuro. PTOT rec MOD.   Status:inpatient  Payor: [employer]  Disposition: NEW SNF- [facility]. Has auth good through 8.11, SNF will have bed ready 8/8 for dc.   Barrier: bed at SNF  ADOD: FRIDAY     Tannaemilio BlairHelga TCC      08/07/25 1117   Rapid Rounds   Attendance Provider;Care Transitions   Expected Discharge Disposition SNF  ([facility]. auth food through 8/11. SNF will have bed 8/8)   Today we still await:   (MED READY)

## 2025-08-07 NOTE — PROGRESS NOTES
Pharmacy Medication History     Source of Information: Per daughter     Additional concerns with the patient's PTA list.   Never started patient on the sinemet     Notified Provider via Haiku : Yes    The following updates were made to the Prior to Admission medication list:     Medications ADDED:   Flonase  Mucinex   Culturelle   Cranberry   Medications CHANGED:  Colace is prn   Medications REMOVED:   N/a  Medications NOT TAKING:   Tessalon   Sinemet   Duoneb   Ditropan     Allergy reviewed : Yes    Meds 2 Beds : N/A    Outpatient pharmacy confirmed and updated in chart : Yes    Ph[facility]     The list below reflectives the updated PTA list. Please review each medication in order reconciliation for additional clarification and justification.    Prior to Admission Medications   Prescriptions Last Dose   Lactobacillus rhamnosus GG (CULTURELLE ORAL)    Sig: Take 1 capsule by mouth once daily.   NIFEdipine CC 90 mg 24 hr tablet 8/3/2025   Sig: Take 1 tablet (90 mg) by mouth once daily.   acetaminophen (Tylenol) 325 mg tablet 8/3/2025   Sig: Take 2 tablets (650 mg) by mouth every 6 hours if needed for mild pain (1 - 3).   atorvastatin (Lipitor) 20 mg tablet 8/3/2025   Sig: Take 1 tablet (20 mg) by mouth once daily.                       cranberry extract 650 mg capsule    Sig: Take 1 capsule by mouth once daily.   docusate sodium (Colace) 100 mg capsule 8/3/2025   Sig: Take 1 capsule (100 mg) by mouth once daily as needed for constipation.   fluticasone (Flonase) 50 mcg/actuation nasal spray    Sig: Administer 1 spray into each nostril once daily. Shake gently. Before first use, prime pump. After use, clean tip and replace cap.   guaiFENesin (Mucinex) 1,200 mg tablet extended release 12hr    Sig: Take 1 tablet (1,200 mg) by mouth once daily.             krill oil 500 mg capsule 8/3/2025   Sig: Take 1 capsule (500 mg) by mouth once daily.   lubricating eye drops ophthalmic solution Past  Week   Sig: Administer 1 drop into both eyes 3 times a day as needed for dry eyes.   multivit-minerals/folic acid (CENTRUM ADULTS ORAL) 8/3/2025   Sig: Take 1 tablet by mouth once daily.             polyethylene glycol (Glycolax, Miralax) 17 gram packet Past Week   Sig: Take 17 g by mouth once daily as needed (constipation).      Facility-Administered Medications: None       The list below reflectives the updated allergy list. Please review each documented allergy for additional clarification and justification.    No Known Allergies       08/07/25 at 2:56 PM - Sydnee Zee

## 2025-08-07 NOTE — PROGRESS NOTES
Marlo Carl is a 85 y.o. male     Discussed with neurology  They recommend continuing Keppra  Patient denied by insurance company after peer to peer  Ask PT OT to reassess patient      Review of Systems     Constitutional: no fever, no chills, not feeling poorly, not feeling tired   Cardiovascular: no chest pain   Respiratory: no cough, wheezing or shortness of breath a  Gastrointestinal: no abdominal pain, no constipation, no melena, no nausea, no diarrhea, no vomiting and no blood in stools.   Neurological: no headache,   All other systems have been reviewed and are negative for complaint.       Vitals:    08/07/25 1545   BP: 154/80   Pulse: 75   Resp: 17   Temp: 36.4 °C (97.5 °F)   SpO2: 100%        Scheduled medications  Scheduled Medications[1]  Continuous medications  Continuous Medications[2]  PRN medications  PRN Medications[3]    Lab Review   Results from last 7 days   Lab Units 08/07/25  0602 08/06/25  0544 08/05/25  0554   WBC AUTO x10*3/uL 4.0* 4.0* 5.4   HEMOGLOBIN g/dL 11.6* 12.2* 12.1*   HEMATOCRIT % 34.3* 35.9* 37.5*   PLATELETS AUTO x10*3/uL 205 216 216     Results from last 7 days   Lab Units 08/07/25  0602 08/06/25  0544 08/05/25  0554 08/04/25  0947 08/03/25  1822   SODIUM mmol/L 135* 135* 137   < > 133*   POTASSIUM mmol/L 4.5 4.4 4.5   < > 4.3   CHLORIDE mmol/L 102 101 103   < > 102   CO2 mmol/L 28 25 29   < > 26   BUN mg/dL 40* 32* 28*   < > 28*   CREATININE mg/dL 2.32* 1.94* 1.97*   < > 1.85*   CALCIUM mg/dL 8.6 8.6 9.0   < > 8.0*   PROTEIN TOTAL g/dL  --   --   --   --  6.7   BILIRUBIN TOTAL mg/dL  --   --   --   --  0.3   ALK PHOS U/L  --   --   --   --  72   ALT U/L  --   --   --   --  8*   AST U/L  --   --   --   --  12   GLUCOSE mg/dL 93 83 93   < > 85    < > = values in this interval not displayed.     Results from last 7 days   Lab Units 08/04/25  1843 08/04/25  0947 08/03/25  1955   TROPHS ng/L 115* 126* 115*        MR brain wo IV contrast   Final Result   1. No evidence of acute  infarct, or other acute findings. No apparent   interval change from 2018 brain MRI examination.   2. Right cerebral encephalomalacia consistent with large old right   MCA infarct.   3. Old lacunar infarct involving left basal ganglia and adjacent left   cerebral deep white matter.   4. Redemonstrated is an advanced degree of presumed chronic   small-vessel ischemic change in the cerebral white matter s.        I personally reviewed the images/study and I agree with the findings   as stated by Resident Dr. Tarah Olivares MD. This study was   interpreted at [place], [place].        MACRO:   None        Signed by: Shane Tracey 8/5/2025 3:19 PM   Dictation workstation:   [id number]      CT brain attack angio head and neck W and WO IV contrast   Final Result   CT angiography of the carotid and vertebral circulation is within   normal limits.        MACRO:   none        Signed by: Dinesh Hirsch 8/3/2025 6:34 PM   Dictation workstation:   [id number]      CT brain attack head wo IV contrast   Final Result   Large area of remote infarction and extensive diffuse confluent areas   of subcortical and periventricular white matter changes suggestive of   chronic small vessel ischemic disease. Findings similar to previous   study. No evidence of acute cortical infarct or intracranial   hemorrhage. If there is persistent clinical concern for acute   cortical infarction, MRI with diffusion-weighted images is a better   means for further evaluation as clinically warranted.        MACRO:   None        Signed by: Benitez Guerrero 8/3/2025 6:15 PM   Dictation workstation:   [id number]            Physical Exam    Constitutional   General appearance: Alert and in no acute distress.   Pulmonary   Respiratory assessment: No respiratory distress, normal respiratory rhythm and effort.    Auscultation of Lungs: Clear bilateral breath sounds.   Cardiovascular   Auscultation of heart:  Apical pulse normal, heart rate and rhythm normal, normal S1 and S2, no murmurs and no pericardial rub.    Exam for edema: No peripheral edema.   Abdomen   Abdominal Exam: No bruits, normal bowel sounds, soft, non-tender, no abdominal mass palpated.    Liver and Spleen exam: No hepato-splenomegaly.   Musculoskeletal     Inspection/palpation of joints, bones and muscles: No joint swelling. Normal movement of all extremities.   Neurologic   Cranial nerves: Nerves 2-12 were intact, no focal neuro defects.         Assessment/Plan      #New onset seizure  MRI negative for new CVA  EEG negative  Discussed with neurology and they recommend continuing Keppra     #History of CVA  No new CVA seen  Continue aspirin and statins     #Hypertension  Stable continue nifedipine     #Dyslipidemia  Stable continue Lipitor 40 mg     #Parkinson's  #Vascular dementia  Continue Sinemet    #Deconditioning  PT OT input noted           [1] aspirin, 81 mg, oral, Daily  atorvastatin, 40 mg, oral, Nightly  carbidopa-levodopa, 1 tablet, oral, TID  enoxaparin, 30 mg, subcutaneous, q24h  levETIRAcetam, 500 mg, oral, BID  NIFEdipine ER, 90 mg, oral, Daily before breakfast  pantoprazole, 40 mg, oral, Daily before breakfast   Or  pantoprazole, 40 mg, intravenous, Daily before breakfast     [2]    [3] PRN medications: acetaminophen **OR** acetaminophen **OR** acetaminophen, alum-mag hydroxide-simeth, guaiFENesin, melatonin, ondansetron **OR** ondansetron, polyethylene glycol

## 2025-08-07 NOTE — PROGRESS NOTES
Occupational Therapy    OT Treatment    Patient Name: Marlo Carl  MRN: [id number]  Department: [facility] A 6  Room: [address] [place] [address]  Today's Date: 8/7/2025  Time Calculation  Start Time: 1406  Stop Time: 1419  Time Calculation (min): 13 min        Assessment:  OT Assessment: Pt demonstrating progression towards POC with increased time + effort required for functional tasks. Increased cues required for safety awareness throughout session. Pt would benefit from continued skilled OT services to improve strength, balance, and functional tolerance to increase independence with ADL tasks  Prognosis: Fair  Barriers to Discharge Home: Cognition needs, Physical needs  Cognition Needs: Insight of patient limited regarding functional ability/needs, Cognition-related high falls risk  Physical Needs: Stair navigation into home limited by function/safety, Stair navigation to access bed limited by function/safety, Stair navigation to access bath limited by function/safety, Intermittent mobility assistance needed, Intermittent ADL assistance needed  Evaluation/Treatment Tolerance: Patient limited by fatigue  End of Session Communication: Bedside nurse  End of Session Patient Position: Bed, 3 rail up, Alarm on  OT Assessment Results: Decreased ADL status, Decreased upper extremity range of motion, Decreased upper extremity strength, Decreased safe judgment during ADL, Decreased cognition, Decreased endurance, Decreased functional mobility, Decreased IADLs  Prognosis: Fair  Barriers to Discharge: Inaccessible home environment, Decreased caregiver support  Evaluation/Treatment Tolerance: Patient limited by fatigue  Plan:  Treatment Interventions: ADL retraining, Functional transfer training, UE strengthening/ROM, Endurance training, Cognitive reorientation, Patient/family training, Equipment evaluation/education, Compensatory technique education  OT Frequency: 3 times per week (During this acute inpatient hospitalization.)  OT Discharge  Recommendations: Moderate intensity level of continued care (Based on current functional status and rehab potential, patient is anticipated to tolerate and benefit from 5 or more days per week of skilled rehabilitative therapy after discharge from this acute inpatient hospitalization.)  Equipment Recommended upon Discharge:  (TBD owns FWW)  OT Recommended Transfer Status: Assist of 1  OT - OK to Discharge: Yes (Per POC)  Treatment Interventions: ADL retraining, Functional transfer training, UE strengthening/ROM, Endurance training, Cognitive reorientation, Patient/family training, Equipment evaluation/education, Compensatory technique education    Subjective   OT Visit Info:  OT Received On: 08/07/25  General Visit Info:  General  Reason for Referral: Pt is a 84 yo male presenting due to facial droop and seizure like activity. CT (-) for acute intracranial abnormality; pending MRI. Pt with relevant history of CVA with L sided weakness as well as Parkinsonism.  Referred By: George Crain MD  Past Medical History Relevant to Rehab: Medical History[1]  Prior to Session Communication: Bedside nurse  Patient Position Received: Up in chair, Alarm on  General Comment: Cleared for therapy per RN. Pt found seated in chair upon arrival and agreeable to tx  Precautions:  Medical Precautions: Fall precautions, Seizure precautions    Pain:  Pain Assessment  Pain Assessment: 0-10  0-10 (Numeric) Pain Score: 0 - No pain    Objective    Cognition:  Cognition  Overall Cognitive Status: Impaired at baseline  Orientation Level: Disoriented to time, Disoriented to situation (h/o dementia)  Insight: Mild  Processing Speed: Delayed  Coordination:  Movements are Fluid and Coordinated: No  Upper Body Coordination: delayed rate + accuracy of BUE movements  Activities of Daily Living:    Grooming  Grooming Level of Assistance: Setup, Close supervision  Grooming Where Assessed: Standing sinkside  Grooming Comments: s/u for face washing task  at sink with cues to initiate task and for efficiency    LE Dressing  LE Dressing: Yes  Adult Briefs Level of Assistance: Moderate assistance  LE Dressing Where Assessed: Edge of bed  LE Dressing Comments: assist to don/doff brief over hips with pt able to don with modified figure four technique with increased time + effort    Bed Mobility/Transfers: Bed Mobility  Bed Mobility: Yes  Bed Mobility 1  Bed Mobility 1: Sitting to supine  Level of Assistance 1: Close supervision  Bed Mobility Comments 1: increased effort to bring BLE on bed with cues to scoot hips to midline for safety    Transfers  Transfer: Yes  Transfer 1  Technique 1: Sit to stand, Stand to sit  Transfer Device 1: Gait belt, Walker  Transfer Level of Assistance 1: Minimum assistance, Minimal verbal cues  Trials/Comments 1: assist for trunk elevation with cues for proper hand placement with decreased eccentric lowering to EOB    Functional Mobility:  Functional Mobility  Functional Mobility Performed: Yes  Functional Mobility 1  Device 1: Rolling walker  Functional Mobility Support Devices: Gait belt  Assistance 1: Minimum assistance, Moderate verbal cues  Comments 1: participated in functional mobility short household distance at FWW with directional cues and for postural alignment with increased time + effort required. Pt requesting to return to bed at end of task, demonstrating fair- balance    Standing Balance:  Dynamic Standing Balance  Dynamic Standing-Level of Assistance: Minimum assistance, Contact guard  Dynamic Standing-Comments: fair- balance sinkside + bedside during grooming task + LB dressing    Outcome Measures:[facility] Daily Activity  Putting on and taking off regular lower body clothing: A lot  Bathing (including washing, rinsing, drying): A lot  Putting on and taking off regular upper body clothing: A lot  Toileting, which includes using toilet, bedpan or urinal: Total  Taking care of personal grooming such as brushing teeth: A  little  Eating Meals: A little  Daily Activity - Total Score: 13    Education Documentation  Body Mechanics, taught by DANIEL Almonte at 8/7/2025  3:29 PM.  Learner: Patient  Readiness: Acceptance  Method: Explanation  Response: Needs Reinforcement, Verbalizes Understanding    ADL Training, taught by DANIEL Almonte at 8/7/2025  3:29 PM.  Learner: Patient  Readiness: Acceptance  Method: Explanation  Response: Needs Reinforcement, Verbalizes Understanding    Education Comments  No comments found.        Problem: COGNITION/SAFETY  Goal: Patient will demonstrated orientation x 4 with verbal cues and visual cues.  Description: ORIENTATION  Outcome: Progressing     Problem: ADLs  Goal: Patient will perform UB and LB sponge bathing with minimal assist  level of assistance and use of adaptive techniques/equipment.  Outcome: Progressing  Goal: Patient with complete upper body dressing with stand by assist level of assistance donning and doffing all UE clothes with PRN adaptive equipment while seated.  Outcome: Progressing  Goal: Patient with complete lower body dressing with minimal assist  level of assistance donning and doffing all LE clothes  with PRN adaptive equipment while seated.  Outcome: Progressing  Goal: Patient will complete daily grooming tasks with set-up, supervision level of assistance and PRN adaptive equipment while seated/standing.  Outcome: Progressing  Goal: Patient will complete toileting including hygiene clothing management/hygiene with minimal assist  level of assistance and raised toilet seat and grab bars.  Outcome: Not Progressing     Problem: TRANSFERS  Goal: Patient will perform bed mobility modified independent level of assistance and bed rails in order to improve safety and independence with mobility  Outcome: Progressing  Goal: Patient will complete sit to stand transfer with supervision level of assistance and least restrictive device in order to improve safety and  prepare for out of bed mobility.  Outcome: Progressing     Problem: MOBILITY  Goal: Patient will perform Functional mobility x Household distances/Community Distances with contact guard assist level of assistance and least restrictive device in order to improve safety and functional mobility.  Outcome: Progressing                        [1]   Past Medical History:  Diagnosis Date    Anemia     Arthritis     BPH with obstruction/lower urinary tract symptoms     Cerebral vascular accident (Multi)     1996, 2016 w/ residual left sided weakness, CT Head 9/8/22  Large area of encephalomalacia of the right MCA territory, unchanged since prior. 3. Advanced chronic microvascular ischemic changes and chronic left basal ganglia lacunar infarct.    CKD (chronic kidney disease)     Cognitive decline     Dementia     on donzepril    Depression     Hearing aid worn     refuses to wear    HL (hearing loss)     Hypertension     OAB (overactive bladder)     PE (pulmonary thromboembolism) (Multi) 09/07/2018    off anticoags    Resting tremor     mild hands    RLS (restless legs syndrome)     Spinal stenosis     Unspecified urinary incontinence

## 2025-08-07 NOTE — PROGRESS NOTES
8/7/2025 12:59 PM Daughter called. I let her know that patient has insurance auth and that patient will be discharged to [facility] on Friday. Mandi SUNG

## 2025-08-07 NOTE — CARE PLAN
The patient's goals for the shift include      The clinical goals for the shift include maintain HD stable      Problem: Chronic Conditions and Co-morbidities  Goal: Patient's chronic conditions and co-morbidity symptoms are monitored and maintained or improved  Outcome: Progressing     Problem: Safety - Adult  Goal: Free from fall injury  Outcome: Progressing

## 2025-08-08 VITALS
WEIGHT: 163.36 LBS | OXYGEN SATURATION: 100 % | HEIGHT: 71 IN | HEART RATE: 68 BPM | TEMPERATURE: 97.9 F | DIASTOLIC BLOOD PRESSURE: 75 MMHG | RESPIRATION RATE: 15 BRPM | SYSTOLIC BLOOD PRESSURE: 126 MMHG | BODY MASS INDEX: 22.87 KG/M2

## 2025-08-08 LAB
ANION GAP SERPL CALC-SCNC: 12 MMOL/L (ref 10–20)
BUN SERPL-MCNC: 39 MG/DL (ref 6–23)
CALCIUM SERPL-MCNC: 8.8 MG/DL (ref 8.6–10.3)
CHLORIDE SERPL-SCNC: 103 MMOL/L (ref 98–107)
CO2 SERPL-SCNC: 26 MMOL/L (ref 21–32)
CREAT SERPL-MCNC: 2.11 MG/DL (ref 0.5–1.3)
EGFRCR SERPLBLD CKD-EPI 2021: 30 ML/MIN/1.73M*2
ERYTHROCYTE [DISTWIDTH] IN BLOOD BY AUTOMATED COUNT: 14.4 % (ref 11.5–14.5)
GLUCOSE SERPL-MCNC: 86 MG/DL (ref 74–99)
HCT VFR BLD AUTO: 37.4 % (ref 41–52)
HGB BLD-MCNC: 12 G/DL (ref 13.5–17.5)
MCH RBC QN AUTO: 27.2 PG (ref 26–34)
MCHC RBC AUTO-ENTMCNC: 32.1 G/DL (ref 32–36)
MCV RBC AUTO: 85 FL (ref 80–100)
NRBC BLD-RTO: 0 /100 WBCS (ref 0–0)
PLATELET # BLD AUTO: 210 X10*3/UL (ref 150–450)
POTASSIUM SERPL-SCNC: 4.6 MMOL/L (ref 3.5–5.3)
RBC # BLD AUTO: 4.41 X10*6/UL (ref 4.5–5.9)
SODIUM SERPL-SCNC: 136 MMOL/L (ref 136–145)
WBC # BLD AUTO: 4.8 X10*3/UL (ref 4.4–11.3)

## 2025-08-08 PROCEDURE — 85027 COMPLETE CBC AUTOMATED: CPT | Performed by: INTERNAL MEDICINE

## 2025-08-08 PROCEDURE — 2500000004 HC RX 250 GENERAL PHARMACY W/ HCPCS (ALT 636 FOR OP/ED): Performed by: INTERNAL MEDICINE

## 2025-08-08 PROCEDURE — 2500000001 HC RX 250 WO HCPCS SELF ADMINISTERED DRUGS (ALT 637 FOR MEDICARE OP)

## 2025-08-08 PROCEDURE — 36415 COLL VENOUS BLD VENIPUNCTURE: CPT | Performed by: INTERNAL MEDICINE

## 2025-08-08 PROCEDURE — 2500000002 HC RX 250 W HCPCS SELF ADMINISTERED DRUGS (ALT 637 FOR MEDICARE OP, ALT 636 FOR OP/ED): Performed by: INTERNAL MEDICINE

## 2025-08-08 PROCEDURE — 80048 BASIC METABOLIC PNL TOTAL CA: CPT | Performed by: INTERNAL MEDICINE

## 2025-08-08 PROCEDURE — 2500000001 HC RX 250 WO HCPCS SELF ADMINISTERED DRUGS (ALT 637 FOR MEDICARE OP): Performed by: INTERNAL MEDICINE

## 2025-08-08 PROCEDURE — 99239 HOSP IP/OBS DSCHRG MGMT >30: CPT | Performed by: INTERNAL MEDICINE

## 2025-08-08 RX ORDER — NAPROXEN SODIUM 220 MG/1
81 TABLET, FILM COATED ORAL DAILY
Start: 2025-08-08

## 2025-08-08 RX ORDER — ATORVASTATIN CALCIUM 40 MG/1
40 TABLET, FILM COATED ORAL NIGHTLY
Start: 2025-08-08

## 2025-08-08 RX ORDER — LEVETIRACETAM 500 MG/1
500 TABLET ORAL 2 TIMES DAILY
Start: 2025-08-08

## 2025-08-08 RX ADMIN — LEVETIRACETAM 500 MG: 500 TABLET, FILM COATED ORAL at 09:10

## 2025-08-08 RX ADMIN — PANTOPRAZOLE SODIUM 40 MG: 40 TABLET, DELAYED RELEASE ORAL at 06:22

## 2025-08-08 RX ADMIN — ENOXAPARIN SODIUM 30 MG: 30 INJECTION SUBCUTANEOUS at 09:10

## 2025-08-08 RX ADMIN — NIFEDIPINE 90 MG: 30 TABLET, FILM COATED, EXTENDED RELEASE ORAL at 06:22

## 2025-08-08 RX ADMIN — ASPIRIN 81 MG CHEWABLE TABLET 81 MG: 81 TABLET CHEWABLE at 09:10

## 2025-08-08 RX ADMIN — CARBIDOPA AND LEVODOPA 1 TABLET: 25; 100 TABLET ORAL at 09:10

## 2025-08-08 ASSESSMENT — COGNITIVE AND FUNCTIONAL STATUS - GENERAL
CLIMB 3 TO 5 STEPS WITH RAILING: A LOT
HELP NEEDED FOR BATHING: A LOT
DRESSING REGULAR UPPER BODY CLOTHING: A LOT
DRESSING REGULAR LOWER BODY CLOTHING: A LOT
MOBILITY SCORE: 14
DAILY ACTIVITIY SCORE: 16
MOVING FROM LYING ON BACK TO SITTING ON SIDE OF FLAT BED WITH BEDRAILS: A LITTLE
TOILETING: A LOT
WALKING IN HOSPITAL ROOM: A LOT
MOVING TO AND FROM BED TO CHAIR: A LOT
TURNING FROM BACK TO SIDE WHILE IN FLAT BAD: A LITTLE
STANDING UP FROM CHAIR USING ARMS: A LOT

## 2025-08-08 ASSESSMENT — PAIN SCALES - GENERAL: PAINLEVEL_OUTOF10: 0 - NO PAIN

## 2025-08-08 NOTE — DISCHARGE SUMMARY
Discharge Diagnosis  First time seizure (Multi)           Issues Requiring Follow-Up  Therapy    Discharge Meds     Medication List      ASK your doctor about these medications     acetaminophen 325 mg tablet; Commonly known as: Tylenol; Take 2 tablets   (650 mg) by mouth every 6 hours if needed for mild pain (1 - 3).   atorvastatin 20 mg tablet; Commonly known as: Lipitor; Take 1 tablet (20   mg) by mouth once daily.   benzonatate 100 mg capsule; Commonly known as: Tessalon; Take 1 capsule   (100 mg) by mouth 3 times a day as needed for cough. Do not crush or chew.   carbidopa-levodopa  mg tablet; Commonly known as: Sinemet; Start   with half a tablet twice a day for one week.  Then increase to half a   tablet three times a day for one week.  Then increase to 0.5 tablet in the   morning, 0.5 tablet at lunch and a full tablet at bedtime for one week.    Then increase to 0.5 tablet in the morning, a full tablet at lunch, and a   full tablet at bedtime for one week.  Then increase to a full tablet three   times daily.   CENTRUM ADULTS ORAL   cranberry extract 650 mg capsule   CULTURELLE ORAL   docusate sodium 100 mg capsule; Commonly known as: Colace   fluticasone 50 mcg/actuation nasal spray; Commonly known as: Flonase   ipratropium-albuteroL 0.5-2.5 mg/3 mL nebulizer solution; Commonly known   as: Duo-Neb; Take 3 mL by nebulization every 2 hours if needed for   wheezing or shortness of breath.   krill oil 500 mg capsule   lubricating eye drops ophthalmic solution; Administer 1 drop into both   eyes 3 times a day as needed for dry eyes.   Mucinex 1,200 mg tablet extended release 12hr; Generic drug: guaiFENesin   NIFEdipine ER 90 mg 24 hr tablet; Commonly known as: Adalat CC; Take 1   tablet (90 mg) by mouth once daily.   oxyBUTYnin 5 mg tablet; Commonly known as: Ditropan; Take 1 tablet (5   mg) by mouth 3 times a day.   polyethylene glycol 17 gram packet; Commonly known as: Glycolax,   Miralax; Take 17 g by  mouth once daily as needed (constipation).       Test Results Pending At Discharge  Pending Labs       Order Current Status    Extra Urine Gray Tube Collected (08/06/25 1158)    Urinalysis with Reflex Culture and Microscopic In process            Hospital Course   Patient with a past medical history of  HTN, HLD, BPH with LUTS, CKD III. Lumbar Stenosis, Pulmonary Embolism, hx of CVA with Vascular Dementia, OAB, Incontinence, Parkinsonism was doing well until yesterday when he was noted to have facial droop and seizure-like activity  History obtained from emergency room notes  Patient was sitting in the recliner when he became stiff and started shaking for several minutes had urinary incontinence and became unresponsive  And then slowly the responsiveness improved  Patient has vascular dementia and he does not remember this event  Was brought to the emergency room for initial CAT scan does not show any new strokes and he was loaded with Keppra  Initial CAT scan was negative and we obtained an MRI which did not show any new strokes but did show the old strokes  After discussed with neurology it is agreeable that he should go home on Keppra  Seen by physical Occupational Therapy the recommended rehab  Be discharged in stable condition to rehab for therapy    Pertinent Physical Exam At Time of Discharge  Physical Exam    Constitutional   General appearance: Alert and in no acute distress.     Pulmonary   Respiratory assessment: No respiratory distress, normal respiratory rhythm and effort.    Auscultation of Lungs: Clear bilateral breath sounds.   Cardiovascular   Auscultation of heart: Apical pulse normal, heart rate and rhythm normal, normal S1 and S2, no murmurs and no pericardial rub.    Exam for edema: No peripheral edema.   Abdomen   Abdominal Exam: No bruits, normal bowel sounds, soft, non-tender, no abdominal mass palpated.    Liver and Spleen exam: No hepato-splenomegaly.   Musculoskeletal      Inspection/palpation of joints, bones and muscles: No joint swelling. Normal movement of all extremities.   Skin   Skin inspection: Normal skin color and pigmentation, normal skin turgor and no visible rash.   Neurologic   Cranial nerves: Nerves 2-12 were intact, no focal neuro defects.    Outpatient Follow-Up  Future Appointments   Date Time Provider Department [place]   8/26/2025  4:30 PM George Crain MD [id number] [facility]   11/5/2025  3:30 PM Yaw Dowd MD [id number] [facility]       Patient seen at bedside. Events from the last visit reviewed. Discussed with staff. Results of tests and investigations from last visit reviewed and discussed with patient/Family. Electronic chart on [facility] reviewed. Input / Recommendations  from consultants  appreciated and reviewed and agreed with.     discharge summary and profile completed. medications reviewed and discussed with patient and family.  scripts completed and signed.     total discharge time in excess of 30 minutes.    George Crain MD   None or intercostal

## 2025-08-08 NOTE — PROGRESS NOTES
08/08/25 0934   Discharge Planning   Assistance Needed transport arranged for 1130a today, daughter Surekha aware, appropriate staff also aware of dc time and place today. AVS, quintero kim attached, DSC completed 7000.   Expected Discharge Disposition SNF   Does the patient need discharge transport arranged? Yes   [facility] coordination needed? Yes   Has discharge transport been arranged? Yes   What day is the transport expected? 08/08/25   What time is the transport expected? 1130

## 2025-08-08 NOTE — CARE PLAN
The patient's goals for the shift include      The clinical goals for the shift include Remain HD stable

## 2025-08-08 NOTE — DOCUMENTATION CLARIFICATION NOTE
"    PATIENT:               CARYN PRATHER  ACCT #:                  [id number]  MRN:                       [id number]  :                       1940  ADMIT DATE:       8/3/2025 5:53 PM  DISCH DATE:        2025 12:00 PM  RESPONDING PROVIDER #:        [id number]          PROVIDER RESPONSE TEXT:    Chronic Myocardial Injury    CDI QUERY TEXT:    Clarification        Instruction:    Based on your assessment of the patient and the clinical information, please provide the requested documentation by clicking on the appropriate radio button and enter any additional information if prompted.    Question: Is there a diagnosis for patient's Troponins    When answering this query, please exercise your independent professional judgment. The fact that a question is being asked, does not imply that any particular answer is desired or expected.    The patient's clinical indicators include:  Clinical Information: 84 yr old man with seizure like activity. PMH includes CKD 3, CVA, PE, Parkinson's, and vascular dementia.    Clinical Indicators:   Progress Notes: CNP Temi  \"Elevated troponin -c/w prior levels and EKG unchanged from prior\"    Labs: Troponin I, high sensitivity(8/3-): 88    115     126     115    25: Troponin I, high sensitivity: 210     200    Treatment: lab monitoring    Risk Factors: age, CKD  Options provided:  -- Chronic Myocardial Injury  -- Other - I will add my own diagnosis  -- Refer to Clinical Documentation Reviewer    Query created by: Imelda Angeles on 2025 1:48 PM      Electronically signed by:  JORGE L STEVENS MD 2025 2:08 PM          "

## 2025-08-26 ENCOUNTER — APPOINTMENT (OUTPATIENT)
Dept: PRIMARY CARE | Facility: CLINIC | Age: 85
End: 2025-08-26
Payer: MEDICARE

## 2025-08-26 VITALS
RESPIRATION RATE: 16 BRPM | DIASTOLIC BLOOD PRESSURE: 80 MMHG | WEIGHT: 160.6 LBS | TEMPERATURE: 98.1 F | BODY MASS INDEX: 22.4 KG/M2 | HEART RATE: 68 BPM | SYSTOLIC BLOOD PRESSURE: 120 MMHG

## 2025-08-26 DIAGNOSIS — E78.49 OTHER HYPERLIPIDEMIA: ICD-10-CM

## 2025-08-26 DIAGNOSIS — I63.9 CEREBRAL INFARCTION, UNSPECIFIED MECHANISM (MULTI): ICD-10-CM

## 2025-08-26 DIAGNOSIS — N18.32 STAGE 3B CHRONIC KIDNEY DISEASE (MULTI): ICD-10-CM

## 2025-08-26 DIAGNOSIS — I10 PRIMARY HYPERTENSION: ICD-10-CM

## 2025-08-26 DIAGNOSIS — G40.909 SEIZURE DISORDER (MULTI): ICD-10-CM

## 2025-08-26 DIAGNOSIS — R56.9 FIRST TIME SEIZURE (MULTI): ICD-10-CM

## 2025-08-26 DIAGNOSIS — G20.C PARKINSONISM, UNSPECIFIED PARKINSONISM TYPE (MULTI): Primary | ICD-10-CM

## 2025-08-26 PROCEDURE — 99214 OFFICE O/P EST MOD 30 MIN: CPT | Performed by: INTERNAL MEDICINE

## 2025-08-26 PROCEDURE — G2211 COMPLEX E/M VISIT ADD ON: HCPCS | Performed by: INTERNAL MEDICINE

## 2025-08-26 PROCEDURE — 3074F SYST BP LT 130 MM HG: CPT | Performed by: INTERNAL MEDICINE

## 2025-08-26 PROCEDURE — 3079F DIAST BP 80-89 MM HG: CPT | Performed by: INTERNAL MEDICINE

## 2025-08-26 PROCEDURE — 1111F DSCHRG MED/CURRENT MED MERGE: CPT | Performed by: INTERNAL MEDICINE

## 2025-08-26 RX ORDER — ATORVASTATIN CALCIUM 40 MG/1
40 TABLET, FILM COATED ORAL NIGHTLY
Qty: 90 TABLET | Refills: 2 | Status: SHIPPED | OUTPATIENT
Start: 2025-08-26

## 2025-08-26 RX ORDER — LEVETIRACETAM 500 MG/1
500 TABLET ORAL 2 TIMES DAILY
Qty: 180 TABLET | Refills: 2 | Status: SHIPPED | OUTPATIENT
Start: 2025-08-26

## 2025-12-04 ENCOUNTER — APPOINTMENT (OUTPATIENT)
Dept: PRIMARY CARE | Facility: CLINIC | Age: 85
End: 2025-12-04
Payer: MEDICARE

## (undated) DEVICE — TOWEL PACK, STERILE, 4/PACK, BLUE

## (undated) DEVICE — SUTURE, MONOCRYL, 4-0, 18 IN, PS2, UNDYED

## (undated) DEVICE — TRIAL, STIMULATOR, EXTERNAL

## (undated) DEVICE — DRAPE, SHEET, 17 X 23 IN

## (undated) DEVICE — DRAPE, SHEET, ENDOSCOPY, GENERAL, FENESTRATED, ARMBOARD COVER, 98 X 123.5 IN, DISPOSABLE, LF, STERILE

## (undated) DEVICE — ADHESIVE, SKIN, LIQUIBAND EXCEED

## (undated) DEVICE — NEEDLE, HYPODERMIC, 25 G X 1.5 IN, A BEVEL, STERILE

## (undated) DEVICE — GLOVE, SURGEON, PREMIERPRO PI, MICRO, SZ-7.5, PF, WH

## (undated) DEVICE — DRAPE, SHEET, LAPAROTOMY

## (undated) DEVICE — REMOTE CONTROL, PATIENT (2301)

## (undated) DEVICE — DRAPE COVER, C ARM, FLOUROSCAN IMAGING SYS

## (undated) DEVICE — DRESSING, ISLAND, TELFA, 4 X 5 IN

## (undated) DEVICE — DRAPE, C-ARM, W/12 IN COVER, LI XTRAY TUBE

## (undated) DEVICE — Device

## (undated) DEVICE — SUTURE, VICRYL, 2-0, 27 IN, BR/SH 27, VIOLET